# Patient Record
Sex: MALE | Race: WHITE | Employment: UNEMPLOYED | URBAN - METROPOLITAN AREA
[De-identification: names, ages, dates, MRNs, and addresses within clinical notes are randomized per-mention and may not be internally consistent; named-entity substitution may affect disease eponyms.]

---

## 2017-01-01 ENCOUNTER — HOSPITAL ENCOUNTER (INPATIENT)
Age: 62
LOS: 14 days | Discharge: NURSING, REHAB OR LONG-TERM CARE | DRG: 682 | End: 2017-10-20
Attending: EMERGENCY MEDICINE | Admitting: INTERNAL MEDICINE
Payer: COMMERCIAL

## 2017-01-01 ENCOUNTER — APPOINTMENT (OUTPATIENT)
Dept: CT IMAGING | Age: 62
DRG: 682 | End: 2017-01-01
Payer: COMMERCIAL

## 2017-01-01 ENCOUNTER — OFFICE VISIT (OUTPATIENT)
Dept: FAMILY MEDICINE CLINIC | Facility: CLINIC | Age: 62
End: 2017-01-01

## 2017-01-01 ENCOUNTER — OFFICE VISIT (OUTPATIENT)
Dept: FAMILY MEDICINE CLINIC | Age: 62
End: 2017-01-01
Payer: MEDICARE

## 2017-01-01 ENCOUNTER — TELEPHONE (OUTPATIENT)
Dept: FAMILY MEDICINE CLINIC | Facility: CLINIC | Age: 62
End: 2017-01-01

## 2017-01-01 ENCOUNTER — APPOINTMENT (OUTPATIENT)
Dept: ULTRASOUND IMAGING | Age: 62
DRG: 682 | End: 2017-01-01
Payer: COMMERCIAL

## 2017-01-01 ENCOUNTER — APPOINTMENT (OUTPATIENT)
Dept: GENERAL RADIOLOGY | Age: 62
DRG: 682 | End: 2017-01-01
Payer: COMMERCIAL

## 2017-01-01 ENCOUNTER — APPOINTMENT (OUTPATIENT)
Dept: MRI IMAGING | Age: 62
DRG: 682 | End: 2017-01-01
Payer: COMMERCIAL

## 2017-01-01 VITALS
SYSTOLIC BLOOD PRESSURE: 120 MMHG | HEART RATE: 113 BPM | BODY MASS INDEX: 30.21 KG/M2 | TEMPERATURE: 99 F | WEIGHT: 211 LBS | OXYGEN SATURATION: 97 % | DIASTOLIC BLOOD PRESSURE: 78 MMHG | HEIGHT: 70 IN

## 2017-01-01 VITALS
DIASTOLIC BLOOD PRESSURE: 76 MMHG | WEIGHT: 187 LBS | HEART RATE: 94 BPM | BODY MASS INDEX: 26.77 KG/M2 | TEMPERATURE: 97.5 F | SYSTOLIC BLOOD PRESSURE: 135 MMHG | HEIGHT: 70 IN | OXYGEN SATURATION: 98 % | RESPIRATION RATE: 20 BRPM

## 2017-01-01 VITALS
HEIGHT: 68 IN | TEMPERATURE: 97.9 F | DIASTOLIC BLOOD PRESSURE: 80 MMHG | HEART RATE: 64 BPM | BODY MASS INDEX: 32.74 KG/M2 | SYSTOLIC BLOOD PRESSURE: 130 MMHG | WEIGHT: 216 LBS

## 2017-01-01 VITALS
BODY MASS INDEX: 31.7 KG/M2 | HEIGHT: 69 IN | HEART RATE: 84 BPM | DIASTOLIC BLOOD PRESSURE: 78 MMHG | WEIGHT: 214 LBS | SYSTOLIC BLOOD PRESSURE: 120 MMHG | TEMPERATURE: 98.2 F

## 2017-01-01 DIAGNOSIS — R05.9 COUGH: ICD-10-CM

## 2017-01-01 DIAGNOSIS — J20.8 ACUTE BRONCHITIS DUE TO OTHER SPECIFIED ORGANISMS: Primary | ICD-10-CM

## 2017-01-01 DIAGNOSIS — B34.9 NONSPECIFIC SYNDROME SUGGESTIVE OF VIRAL ILLNESS: Primary | ICD-10-CM

## 2017-01-01 DIAGNOSIS — R50.9 FEVER CHILLS: ICD-10-CM

## 2017-01-01 DIAGNOSIS — N17.9 ACUTE KIDNEY INJURY (HCC): Primary | ICD-10-CM

## 2017-01-01 DIAGNOSIS — C90.02 MULTIPLE MYELOMA IN RELAPSE (HCC): ICD-10-CM

## 2017-01-01 DIAGNOSIS — J40 BRONCHITIS: Primary | ICD-10-CM

## 2017-01-01 LAB
-: ABNORMAL
ABO/RH: NORMAL
ABSOLUTE EOS #: 0 K/UL (ref 0–0.4)
ABSOLUTE EOS #: 0.01 K/UL (ref 0–0.4)
ABSOLUTE EOS #: 0.04 K/UL (ref 0–0.4)
ABSOLUTE EOS #: 0.05 K/UL (ref 0–0.4)
ABSOLUTE EOS #: 0.06 K/UL (ref 0–0.4)
ABSOLUTE EOS #: 0.07 K/UL (ref 0–0.4)
ABSOLUTE EOS #: 0.09 K/UL (ref 0–0.4)
ABSOLUTE EOS #: 0.09 K/UL (ref 0–0.4)
ABSOLUTE EOS #: 0.14 K/UL (ref 0–0.4)
ABSOLUTE EOS #: 0.16 K/UL (ref 0–0.4)
ABSOLUTE EOS #: 0.19 K/UL (ref 0–0.4)
ABSOLUTE EOS #: 0.2 K/UL (ref 0–0.4)
ABSOLUTE EOS #: 0.29 K/UL (ref 0–0.4)
ABSOLUTE EOS #: 0.32 K/UL (ref 0–0.4)
ABSOLUTE IMMATURE GRANULOCYTE: ABNORMAL K/UL (ref 0–0.3)
ABSOLUTE LYMPH #: 0.3 K/UL (ref 1–4.8)
ABSOLUTE LYMPH #: 0.32 K/UL (ref 1–4.8)
ABSOLUTE LYMPH #: 0.35 K/UL (ref 1–4.8)
ABSOLUTE LYMPH #: 0.36 K/UL (ref 1–4.8)
ABSOLUTE LYMPH #: 0.38 K/UL (ref 1–4.8)
ABSOLUTE LYMPH #: 0.4 K/UL (ref 1–4.8)
ABSOLUTE LYMPH #: 0.43 K/UL (ref 1–4.8)
ABSOLUTE LYMPH #: 0.46 K/UL (ref 1–4.8)
ABSOLUTE LYMPH #: 0.48 K/UL (ref 1–4.8)
ABSOLUTE LYMPH #: 0.5 K/UL (ref 1–4.8)
ABSOLUTE LYMPH #: 0.53 K/UL (ref 1–4.8)
ABSOLUTE LYMPH #: 0.61 K/UL (ref 1–4.8)
ABSOLUTE LYMPH #: 0.74 K/UL (ref 1–4.8)
ABSOLUTE LYMPH #: 0.81 K/UL (ref 1–4.8)
ABSOLUTE MONO #: 0.11 K/UL (ref 0.2–0.8)
ABSOLUTE MONO #: 0.22 K/UL (ref 0.2–0.8)
ABSOLUTE MONO #: 0.22 K/UL (ref 0.2–0.8)
ABSOLUTE MONO #: 0.28 K/UL (ref 0.2–0.8)
ABSOLUTE MONO #: 0.29 K/UL (ref 0.2–0.8)
ABSOLUTE MONO #: 0.35 K/UL (ref 0.2–0.8)
ABSOLUTE MONO #: 0.4 K/UL (ref 0.2–0.8)
ABSOLUTE MONO #: 0.5 K/UL (ref 0.2–0.8)
ABSOLUTE MONO #: 0.51 K/UL (ref 0.2–0.8)
ABSOLUTE MONO #: 0.51 K/UL (ref 0.2–0.8)
ABSOLUTE MONO #: 0.57 K/UL (ref 0.2–0.8)
ABSOLUTE MONO #: 0.72 K/UL (ref 0.2–0.8)
ALBUMIN (CALCULATED): 3.6 G/DL (ref 3.2–5.2)
ALBUMIN PERCENT: 63 % (ref 45–65)
ALBUMIN SERPL-MCNC: 3.8 G/DL (ref 3.5–5.2)
ALBUMIN SERPL-MCNC: 4.1 G/DL (ref 3.5–5.2)
ALBUMIN/GLOBULIN RATIO: ABNORMAL (ref 1–2.5)
ALBUMIN/GLOBULIN RATIO: NORMAL (ref 1–2.5)
ALP BLD-CCNC: 69 U/L (ref 40–129)
ALP BLD-CCNC: 69 U/L (ref 40–129)
ALPHA 1 PERCENT: 4 % (ref 3–6)
ALPHA 2 PERCENT: 16 % (ref 6–13)
ALPHA-1-GLOBULIN: 0.2 G/DL (ref 0.1–0.4)
ALPHA-2-GLOBULIN: 0.9 G/DL (ref 0.5–0.9)
ALT SERPL-CCNC: 12 U/L (ref 5–41)
ALT SERPL-CCNC: 21 U/L (ref 5–41)
AMMONIA: 46 UMOL/L (ref 16–60)
AMORPHOUS: ABNORMAL
ANION GAP SERPL CALCULATED.3IONS-SCNC: 12 MMOL/L (ref 9–17)
ANION GAP SERPL CALCULATED.3IONS-SCNC: 13 MMOL/L (ref 9–17)
ANION GAP SERPL CALCULATED.3IONS-SCNC: 14 MMOL/L (ref 9–17)
ANION GAP SERPL CALCULATED.3IONS-SCNC: 15 MMOL/L (ref 9–17)
ANION GAP SERPL CALCULATED.3IONS-SCNC: 16 MMOL/L (ref 9–17)
ANION GAP SERPL CALCULATED.3IONS-SCNC: 16 MMOL/L (ref 9–17)
ANION GAP SERPL CALCULATED.3IONS-SCNC: 17 MMOL/L (ref 9–17)
ANION GAP SERPL CALCULATED.3IONS-SCNC: 9 MMOL/L (ref 9–17)
ANTIBODY SCREEN: NEGATIVE
ARM BAND NUMBER: NORMAL
AST SERPL-CCNC: 17 U/L
AST SERPL-CCNC: 21 U/L
BACTERIA: ABNORMAL
BASOPHILS # BLD: 0 %
BASOPHILS # BLD: 1 %
BASOPHILS # BLD: 2 %
BASOPHILS ABSOLUTE: 0 K/UL (ref 0–0.2)
BASOPHILS ABSOLUTE: 0.01 K/UL (ref 0–0.2)
BASOPHILS ABSOLUTE: 0.02 K/UL (ref 0–0.2)
BASOPHILS ABSOLUTE: 0.04 K/UL (ref 0–0.2)
BETA GLOBULIN: 0.6 G/DL (ref 0.5–1.1)
BETA PERCENT: 11 % (ref 11–19)
BILIRUB SERPL-MCNC: 0.32 MG/DL (ref 0.3–1.2)
BILIRUB SERPL-MCNC: 0.45 MG/DL (ref 0.3–1.2)
BILIRUBIN DIRECT: 0.16 MG/DL
BILIRUBIN URINE: NEGATIVE
BILIRUBIN, INDIRECT: 0.29 MG/DL (ref 0–1)
BLD PROD TYP BPU: NORMAL
BLD PROD TYP BPU: NORMAL
BUN BLDV-MCNC: 25 MG/DL (ref 8–23)
BUN BLDV-MCNC: 26 MG/DL (ref 8–23)
BUN BLDV-MCNC: 27 MG/DL (ref 8–23)
BUN BLDV-MCNC: 27 MG/DL (ref 8–23)
BUN BLDV-MCNC: 30 MG/DL (ref 8–23)
BUN BLDV-MCNC: 31 MG/DL (ref 8–23)
BUN BLDV-MCNC: 31 MG/DL (ref 8–23)
BUN BLDV-MCNC: 32 MG/DL (ref 8–23)
BUN/CREAT BLD: 10 (ref 9–20)
BUN/CREAT BLD: 11 (ref 9–20)
BUN/CREAT BLD: 8 (ref 9–20)
BUN/CREAT BLD: 9 (ref 9–20)
CALCIUM IONIZED: 1.53 MMOL/L (ref 1.13–1.33)
CALCIUM SERPL-MCNC: 10.2 MG/DL (ref 8.6–10.4)
CALCIUM SERPL-MCNC: 10.3 MG/DL (ref 8.6–10.4)
CALCIUM SERPL-MCNC: 10.4 MG/DL (ref 8.6–10.4)
CALCIUM SERPL-MCNC: 10.7 MG/DL (ref 8.6–10.4)
CALCIUM SERPL-MCNC: 10.9 MG/DL (ref 8.6–10.4)
CALCIUM SERPL-MCNC: 11 MG/DL (ref 8.6–10.4)
CALCIUM SERPL-MCNC: 11.1 MG/DL (ref 8.6–10.4)
CALCIUM SERPL-MCNC: 11.2 MG/DL (ref 8.6–10.4)
CALCIUM SERPL-MCNC: 11.2 MG/DL (ref 8.6–10.4)
CALCIUM SERPL-MCNC: 11.3 MG/DL (ref 8.6–10.4)
CALCIUM SERPL-MCNC: 11.4 MG/DL (ref 8.6–10.4)
CALCIUM SERPL-MCNC: 9.4 MG/DL (ref 8.6–10.4)
CALCIUM SERPL-MCNC: 9.5 MG/DL (ref 8.6–10.4)
CALCIUM SERPL-MCNC: 9.6 MG/DL (ref 8.6–10.4)
CASTS UA: ABNORMAL /LPF
CHLORIDE BLD-SCNC: 102 MMOL/L (ref 98–107)
CHLORIDE BLD-SCNC: 103 MMOL/L (ref 98–107)
CHLORIDE BLD-SCNC: 104 MMOL/L (ref 98–107)
CHLORIDE BLD-SCNC: 105 MMOL/L (ref 98–107)
CHLORIDE BLD-SCNC: 106 MMOL/L (ref 98–107)
CHLORIDE BLD-SCNC: 106 MMOL/L (ref 98–107)
CHLORIDE BLD-SCNC: 107 MMOL/L (ref 98–107)
CHLORIDE BLD-SCNC: 108 MMOL/L (ref 98–107)
CHLORIDE BLD-SCNC: 108 MMOL/L (ref 98–107)
CHLORIDE BLD-SCNC: 109 MMOL/L (ref 98–107)
CHLORIDE BLD-SCNC: 109 MMOL/L (ref 98–107)
CHLORIDE BLD-SCNC: 110 MMOL/L (ref 98–107)
CHLORIDE BLD-SCNC: 112 MMOL/L (ref 98–107)
CHLORIDE BLD-SCNC: 113 MMOL/L (ref 98–107)
CHLORIDE BLD-SCNC: 96 MMOL/L (ref 98–107)
CO2: 20 MMOL/L (ref 20–31)
CO2: 21 MMOL/L (ref 20–31)
CO2: 21 MMOL/L (ref 20–31)
CO2: 22 MMOL/L (ref 20–31)
CO2: 23 MMOL/L (ref 20–31)
CO2: 23 MMOL/L (ref 20–31)
CO2: 25 MMOL/L (ref 20–31)
COLOR: YELLOW
COMMENT UA: ABNORMAL
CREAT SERPL-MCNC: 2.45 MG/DL (ref 0.7–1.2)
CREAT SERPL-MCNC: 2.48 MG/DL (ref 0.7–1.2)
CREAT SERPL-MCNC: 2.52 MG/DL (ref 0.7–1.2)
CREAT SERPL-MCNC: 2.54 MG/DL (ref 0.7–1.2)
CREAT SERPL-MCNC: 2.63 MG/DL (ref 0.7–1.2)
CREAT SERPL-MCNC: 2.67 MG/DL (ref 0.7–1.2)
CREAT SERPL-MCNC: 2.78 MG/DL (ref 0.7–1.2)
CREAT SERPL-MCNC: 2.81 MG/DL (ref 0.7–1.2)
CREAT SERPL-MCNC: 2.9 MG/DL (ref 0.7–1.2)
CREAT SERPL-MCNC: 2.96 MG/DL (ref 0.7–1.2)
CREAT SERPL-MCNC: 3.41 MG/DL (ref 0.7–1.2)
CREAT SERPL-MCNC: 3.42 MG/DL (ref 0.7–1.2)
CREAT SERPL-MCNC: 3.47 MG/DL (ref 0.7–1.2)
CREAT SERPL-MCNC: 3.63 MG/DL (ref 0.7–1.2)
CREAT SERPL-MCNC: 3.83 MG/DL (ref 0.7–1.2)
CREAT SERPL-MCNC: 3.87 MG/DL (ref 0.7–1.2)
CREAT SERPL-MCNC: 3.9 MG/DL (ref 0.7–1.2)
CREATININE URINE: 51 MG/DL (ref 39–259)
CREATININE URINE: 52.9 MG/DL (ref 39–259)
CROSSMATCH RESULT: NORMAL
CROSSMATCH RESULT: NORMAL
CRYSTALS, UA: ABNORMAL /HPF
DIFFERENTIAL TYPE: ABNORMAL
DISPENSE STATUS BLOOD BANK: NORMAL
DISPENSE STATUS BLOOD BANK: NORMAL
EKG ATRIAL RATE: 95 BPM
EKG P AXIS: 39 DEGREES
EKG P-R INTERVAL: 142 MS
EKG Q-T INTERVAL: 352 MS
EKG QRS DURATION: 128 MS
EKG QTC CALCULATION (BAZETT): 442 MS
EKG R AXIS: -27 DEGREES
EKG T AXIS: 38 DEGREES
EKG VENTRICULAR RATE: 95 BPM
EOSINOPHILS RELATIVE PERCENT: 1 %
EOSINOPHILS RELATIVE PERCENT: 10 %
EOSINOPHILS RELATIVE PERCENT: 10 %
EOSINOPHILS RELATIVE PERCENT: 2 %
EOSINOPHILS RELATIVE PERCENT: 3 %
EOSINOPHILS RELATIVE PERCENT: 4 %
EOSINOPHILS RELATIVE PERCENT: 7 %
EOSINOPHILS RELATIVE PERCENT: 7 %
EOSINOPHILS RELATIVE PERCENT: 8 %
EOSINOPHILS RELATIVE PERCENT: 9 %
EPITHELIAL CELLS UA: ABNORMAL /HPF
EXPIRATION DATE: NORMAL
FOLATE: 13.5 NG/ML
FREE KAPPA/LAMBDA RATIO: 488.85 (ref 0.26–1.65)
GAMMA GLOBULIN %: 7 % (ref 9–20)
GAMMA GLOBULIN: 0.4 G/DL (ref 0.5–1.5)
GFR AFRICAN AMERICAN: 19 ML/MIN
GFR AFRICAN AMERICAN: 21 ML/MIN
GFR AFRICAN AMERICAN: 22 ML/MIN
GFR AFRICAN AMERICAN: 26 ML/MIN
GFR AFRICAN AMERICAN: 27 ML/MIN
GFR AFRICAN AMERICAN: 28 ML/MIN
GFR AFRICAN AMERICAN: 28 ML/MIN
GFR AFRICAN AMERICAN: 29 ML/MIN
GFR AFRICAN AMERICAN: 30 ML/MIN
GFR AFRICAN AMERICAN: 31 ML/MIN
GFR AFRICAN AMERICAN: 31 ML/MIN
GFR AFRICAN AMERICAN: 32 ML/MIN
GFR AFRICAN AMERICAN: 32 ML/MIN
GFR NON-AFRICAN AMERICAN: 16 ML/MIN
GFR NON-AFRICAN AMERICAN: 17 ML/MIN
GFR NON-AFRICAN AMERICAN: 18 ML/MIN
GFR NON-AFRICAN AMERICAN: 22 ML/MIN
GFR NON-AFRICAN AMERICAN: 22 ML/MIN
GFR NON-AFRICAN AMERICAN: 23 ML/MIN
GFR NON-AFRICAN AMERICAN: 23 ML/MIN
GFR NON-AFRICAN AMERICAN: 24 ML/MIN
GFR NON-AFRICAN AMERICAN: 25 ML/MIN
GFR NON-AFRICAN AMERICAN: 26 ML/MIN
GFR NON-AFRICAN AMERICAN: 27 ML/MIN
GFR SERPL CREATININE-BSD FRML MDRD: ABNORMAL ML/MIN/{1.73_M2}
GLOBULIN: NORMAL G/DL (ref 1.5–3.8)
GLUCOSE BLD-MCNC: 102 MG/DL (ref 70–99)
GLUCOSE BLD-MCNC: 103 MG/DL (ref 70–99)
GLUCOSE BLD-MCNC: 105 MG/DL (ref 70–99)
GLUCOSE BLD-MCNC: 105 MG/DL (ref 70–99)
GLUCOSE BLD-MCNC: 106 MG/DL (ref 70–99)
GLUCOSE BLD-MCNC: 111 MG/DL (ref 70–99)
GLUCOSE BLD-MCNC: 113 MG/DL (ref 70–99)
GLUCOSE BLD-MCNC: 115 MG/DL (ref 70–99)
GLUCOSE BLD-MCNC: 123 MG/DL (ref 70–99)
GLUCOSE BLD-MCNC: 125 MG/DL (ref 70–99)
GLUCOSE BLD-MCNC: 126 MG/DL (ref 70–99)
GLUCOSE BLD-MCNC: 136 MG/DL (ref 70–99)
GLUCOSE BLD-MCNC: 140 MG/DL (ref 70–99)
GLUCOSE BLD-MCNC: 93 MG/DL (ref 70–99)
GLUCOSE BLD-MCNC: 93 MG/DL (ref 70–99)
GLUCOSE BLD-MCNC: 97 MG/DL (ref 70–99)
GLUCOSE BLD-MCNC: 98 MG/DL (ref 70–99)
GLUCOSE URINE: NEGATIVE
HCT VFR BLD CALC: 19.1 % (ref 41–53)
HCT VFR BLD CALC: 22.5 % (ref 41–53)
HCT VFR BLD CALC: 23.2 % (ref 41–53)
HCT VFR BLD CALC: 23.5 % (ref 41–53)
HCT VFR BLD CALC: 24.6 % (ref 41–53)
HCT VFR BLD CALC: 24.7 % (ref 41–53)
HCT VFR BLD CALC: 24.9 % (ref 41–53)
HCT VFR BLD CALC: 25.6 % (ref 41–53)
HCT VFR BLD CALC: 26.2 % (ref 41–53)
HCT VFR BLD CALC: 26.4 % (ref 41–53)
HCT VFR BLD CALC: 26.5 % (ref 41–53)
HCT VFR BLD CALC: 26.5 % (ref 41–53)
HCT VFR BLD CALC: 26.8 % (ref 41–53)
HCT VFR BLD CALC: 28.9 % (ref 41–53)
HCT VFR BLD CALC: 29 % (ref 41–53)
HCT VFR BLD CALC: 29.3 % (ref 41–53)
HEMOGLOBIN: 10.4 G/DL (ref 13.5–17.5)
HEMOGLOBIN: 6.6 G/DL (ref 13.5–17.5)
HEMOGLOBIN: 7.8 G/DL (ref 13.5–17.5)
HEMOGLOBIN: 8 G/DL (ref 13.5–17.5)
HEMOGLOBIN: 8.1 G/DL (ref 13.5–17.5)
HEMOGLOBIN: 8.2 G/DL (ref 13.5–17.5)
HEMOGLOBIN: 8.4 G/DL (ref 13.5–17.5)
HEMOGLOBIN: 8.5 G/DL (ref 13.5–17.5)
HEMOGLOBIN: 8.6 G/DL (ref 13.5–17.5)
HEMOGLOBIN: 8.8 G/DL (ref 13.5–17.5)
HEMOGLOBIN: 8.9 G/DL (ref 13.5–17.5)
HEMOGLOBIN: 8.9 G/DL (ref 13.5–17.5)
HEMOGLOBIN: 9.1 G/DL (ref 13.5–17.5)
HEMOGLOBIN: 9.1 G/DL (ref 13.5–17.5)
HEMOGLOBIN: 9.6 G/DL (ref 13.5–17.5)
HEMOGLOBIN: 9.8 G/DL (ref 13.5–17.5)
IMMATURE GRANULOCYTES: ABNORMAL %
INFLUENZA A ANTIBODY: NEGATIVE
INFLUENZA B ANTIBODY: NEGATIVE
KAPPA FREE LIGHT CHAINS QNT: 435.08 MG/DL (ref 0.37–1.94)
KETONES, URINE: NEGATIVE
LAMBDA FREE LIGHT CHAINS QNT: 0.89 MG/DL (ref 0.57–2.63)
LEUKOCYTE ESTERASE, URINE: NEGATIVE
LYMPHOCYTES # BLD: 15 %
LYMPHOCYTES # BLD: 16 %
LYMPHOCYTES # BLD: 17 %
LYMPHOCYTES # BLD: 19 %
LYMPHOCYTES # BLD: 19 %
LYMPHOCYTES # BLD: 20 %
LYMPHOCYTES # BLD: 20 %
LYMPHOCYTES # BLD: 21 %
LYMPHOCYTES # BLD: 22 %
LYMPHOCYTES # BLD: 22 %
LYMPHOCYTES # BLD: 23 %
LYMPHOCYTES # BLD: 27 %
LYMPHOCYTES # BLD: 35 %
LYMPHOCYTES # BLD: 36 %
MAGNESIUM: 1.5 MG/DL (ref 1.6–2.6)
MAGNESIUM: 1.6 MG/DL (ref 1.6–2.6)
MAGNESIUM: 1.7 MG/DL (ref 1.6–2.6)
MAGNESIUM: 1.7 MG/DL (ref 1.6–2.6)
MAGNESIUM: 1.8 MG/DL (ref 1.6–2.6)
MAGNESIUM: 1.8 MG/DL (ref 1.6–2.6)
MAGNESIUM: 1.9 MG/DL (ref 1.6–2.6)
MAGNESIUM: 2 MG/DL (ref 1.6–2.6)
MAGNESIUM: 2.1 MG/DL (ref 1.6–2.6)
MAGNESIUM: 2.2 MG/DL (ref 1.6–2.6)
MAGNESIUM: 2.2 MG/DL (ref 1.6–2.6)
MCH RBC QN AUTO: 30.8 PG (ref 26–34)
MCH RBC QN AUTO: 31 PG (ref 26–34)
MCH RBC QN AUTO: 31.1 PG (ref 26–34)
MCH RBC QN AUTO: 31.2 PG (ref 26–34)
MCH RBC QN AUTO: 31.3 PG (ref 26–34)
MCH RBC QN AUTO: 31.5 PG (ref 26–34)
MCH RBC QN AUTO: 31.6 PG (ref 26–34)
MCH RBC QN AUTO: 31.7 PG (ref 26–34)
MCH RBC QN AUTO: 31.7 PG (ref 26–34)
MCH RBC QN AUTO: 31.8 PG (ref 26–34)
MCH RBC QN AUTO: 32 PG (ref 26–34)
MCH RBC QN AUTO: 32.3 PG (ref 26–34)
MCH RBC QN AUTO: 32.9 PG (ref 26–34)
MCHC RBC AUTO-ENTMCNC: 33.2 G/DL (ref 31–37)
MCHC RBC AUTO-ENTMCNC: 33.5 G/DL (ref 31–37)
MCHC RBC AUTO-ENTMCNC: 33.6 G/DL (ref 31–37)
MCHC RBC AUTO-ENTMCNC: 33.6 G/DL (ref 31–37)
MCHC RBC AUTO-ENTMCNC: 33.8 G/DL (ref 31–37)
MCHC RBC AUTO-ENTMCNC: 33.9 G/DL (ref 31–37)
MCHC RBC AUTO-ENTMCNC: 34 G/DL (ref 31–37)
MCHC RBC AUTO-ENTMCNC: 34.1 G/DL (ref 31–37)
MCHC RBC AUTO-ENTMCNC: 34.4 G/DL (ref 31–37)
MCHC RBC AUTO-ENTMCNC: 34.5 G/DL (ref 31–37)
MCHC RBC AUTO-ENTMCNC: 34.6 G/DL (ref 31–37)
MCHC RBC AUTO-ENTMCNC: 34.6 G/DL (ref 31–37)
MCHC RBC AUTO-ENTMCNC: 35.5 G/DL (ref 31–37)
MCV RBC AUTO: 91.3 FL (ref 80–100)
MCV RBC AUTO: 91.8 FL (ref 80–100)
MCV RBC AUTO: 92 FL (ref 80–100)
MCV RBC AUTO: 92.1 FL (ref 80–100)
MCV RBC AUTO: 92.2 FL (ref 80–100)
MCV RBC AUTO: 92.5 FL (ref 80–100)
MCV RBC AUTO: 92.7 FL (ref 80–100)
MCV RBC AUTO: 92.8 FL (ref 80–100)
MCV RBC AUTO: 92.9 FL (ref 80–100)
MCV RBC AUTO: 93.1 FL (ref 80–100)
MCV RBC AUTO: 93.1 FL (ref 80–100)
MCV RBC AUTO: 93.4 FL (ref 80–100)
MCV RBC AUTO: 93.6 FL (ref 80–100)
MCV RBC AUTO: 94 FL (ref 80–100)
MCV RBC AUTO: 94.5 FL (ref 80–100)
METAMYELOCYTES ABSOLUTE COUNT: 0.01 K/UL
METAMYELOCYTES ABSOLUTE COUNT: 0.03 K/UL
METAMYELOCYTES ABSOLUTE COUNT: 0.03 K/UL
METAMYELOCYTES ABSOLUTE COUNT: 0.07 K/UL
METAMYELOCYTES ABSOLUTE COUNT: 0.1 K/UL
METAMYELOCYTES ABSOLUTE COUNT: 0.13 K/UL
METAMYELOCYTES: 1 %
METAMYELOCYTES: 2 %
METAMYELOCYTES: 2 %
METAMYELOCYTES: 3 %
METAMYELOCYTES: 4 %
METAMYELOCYTES: 4 %
MICROALBUMIN/CREAT 24H UR: 49 MG/L
MICROALBUMIN/CREAT UR-RTO: 93 MCG/MG CREAT
MONOCYTES # BLD: 12 %
MONOCYTES # BLD: 13 %
MONOCYTES # BLD: 14 %
MONOCYTES # BLD: 16 %
MONOCYTES # BLD: 19 %
MONOCYTES # BLD: 19 %
MONOCYTES # BLD: 20 %
MONOCYTES # BLD: 22 %
MONOCYTES # BLD: 22 %
MONOCYTES # BLD: 24 %
MONOCYTES # BLD: 26 %
MONOCYTES # BLD: 30 %
MONOCYTES # BLD: 7 %
MONOCYTES # BLD: 9 %
MORPHOLOGY: ABNORMAL
MUCUS: ABNORMAL
MYELOCYTES ABSOLUTE COUNT: 0.01 K/UL
MYELOCYTES ABSOLUTE COUNT: 0.03 K/UL
MYELOCYTES ABSOLUTE COUNT: 0.07 K/UL
MYELOCYTES: 1 %
MYELOCYTES: 2 %
MYELOCYTES: 3 %
NITRITE, URINE: NEGATIVE
NUCLEATED RED BLOOD CELLS: 1 PER 100 WBC
NUCLEATED RED BLOOD CELLS: 2 PER 100 WBC
NUCLEATED RED BLOOD CELLS: 4 PER 100 WBC
OTHER OBSERVATIONS UA: ABNORMAL
P E INTERPRETATION, U: NORMAL
PATHOLOGIST: ABNORMAL
PATHOLOGIST: NORMAL
PATHOLOGIST: NORMAL
PDW BLD-RTO: 13.7 % (ref 11.5–14.5)
PDW BLD-RTO: 13.7 % (ref 11.5–14.5)
PDW BLD-RTO: 13.8 % (ref 11.5–14.5)
PDW BLD-RTO: 14 % (ref 11.5–14.5)
PDW BLD-RTO: 14.2 % (ref 11.5–14.5)
PDW BLD-RTO: 14.2 % (ref 11.5–14.5)
PDW BLD-RTO: 14.4 % (ref 11.5–14.5)
PDW BLD-RTO: 14.5 % (ref 11.5–14.5)
PDW BLD-RTO: 14.8 % (ref 11.5–14.5)
PDW BLD-RTO: 14.8 % (ref 11.5–14.5)
PDW BLD-RTO: 14.9 % (ref 11.5–14.5)
PDW BLD-RTO: 14.9 % (ref 11.5–14.5)
PDW BLD-RTO: 15.3 % (ref 11.5–14.5)
PH UA: 5.5 (ref 5–8)
PHOSPHORUS: 3.5 MG/DL (ref 2.5–4.5)
PHOSPHORUS: 4.5 MG/DL (ref 2.5–4.5)
PHOSPHORUS: 4.8 MG/DL (ref 2.5–4.5)
PHOSPHORUS: 5.5 MG/DL (ref 2.5–4.5)
PLATELET # BLD: 38 K/UL (ref 130–400)
PLATELET # BLD: 41 K/UL (ref 130–400)
PLATELET # BLD: 43 K/UL (ref 130–400)
PLATELET # BLD: 45 K/UL (ref 130–400)
PLATELET # BLD: 46 K/UL (ref 130–400)
PLATELET # BLD: 47 K/UL (ref 130–400)
PLATELET # BLD: 48 K/UL (ref 130–400)
PLATELET # BLD: 49 K/UL (ref 130–400)
PLATELET # BLD: 53 K/UL (ref 130–400)
PLATELET # BLD: 56 K/UL (ref 130–400)
PLATELET # BLD: 58 K/UL (ref 130–400)
PLATELET ESTIMATE: ABNORMAL
PMV BLD AUTO: 6.9 FL (ref 6–12)
PMV BLD AUTO: 7 FL (ref 6–12)
PMV BLD AUTO: 7 FL (ref 6–12)
PMV BLD AUTO: 7.4 FL (ref 6–12)
PMV BLD AUTO: 7.6 FL (ref 6–12)
PMV BLD AUTO: ABNORMAL FL (ref 6–12)
POTASSIUM SERPL-SCNC: 3.5 MMOL/L (ref 3.7–5.3)
POTASSIUM SERPL-SCNC: 3.6 MMOL/L (ref 3.7–5.3)
POTASSIUM SERPL-SCNC: 3.6 MMOL/L (ref 3.7–5.3)
POTASSIUM SERPL-SCNC: 3.7 MMOL/L (ref 3.7–5.3)
POTASSIUM SERPL-SCNC: 3.7 MMOL/L (ref 3.7–5.3)
POTASSIUM SERPL-SCNC: 3.9 MMOL/L (ref 3.7–5.3)
POTASSIUM SERPL-SCNC: 4.3 MMOL/L (ref 3.7–5.3)
POTASSIUM SERPL-SCNC: 4.3 MMOL/L (ref 3.7–5.3)
POTASSIUM SERPL-SCNC: 4.5 MMOL/L (ref 3.7–5.3)
POTASSIUM SERPL-SCNC: 4.6 MMOL/L (ref 3.7–5.3)
POTASSIUM SERPL-SCNC: 4.8 MMOL/L (ref 3.7–5.3)
POTASSIUM SERPL-SCNC: 4.9 MMOL/L (ref 3.7–5.3)
POTASSIUM SERPL-SCNC: 4.9 MMOL/L (ref 3.7–5.3)
POTASSIUM SERPL-SCNC: 5.1 MMOL/L (ref 3.7–5.3)
POTASSIUM SERPL-SCNC: 5.3 MMOL/L (ref 3.7–5.3)
PROTEIN ELECTROPHORESIS, SERUM: ABNORMAL
PROTEIN UA: ABNORMAL
RBC # BLD: 2.09 M/UL (ref 4.5–5.9)
RBC # BLD: 2.43 M/UL (ref 4.5–5.9)
RBC # BLD: 2.52 M/UL (ref 4.5–5.9)
RBC # BLD: 2.55 M/UL (ref 4.5–5.9)
RBC # BLD: 2.64 M/UL (ref 4.5–5.9)
RBC # BLD: 2.64 M/UL (ref 4.5–5.9)
RBC # BLD: 2.66 M/UL (ref 4.5–5.9)
RBC # BLD: 2.77 M/UL (ref 4.5–5.9)
RBC # BLD: 2.81 M/UL (ref 4.5–5.9)
RBC # BLD: 2.82 M/UL (ref 4.5–5.9)
RBC # BLD: 2.85 M/UL (ref 4.5–5.9)
RBC # BLD: 2.92 M/UL (ref 4.5–5.9)
RBC # BLD: 3.1 M/UL (ref 4.5–5.9)
RBC # BLD: 3.12 M/UL (ref 4.5–5.9)
RBC # BLD: 3.16 M/UL (ref 4.5–5.9)
RBC # BLD: ABNORMAL 10*6/UL
RBC UA: ABNORMAL /HPF (ref 0–2)
RENAL EPITHELIAL, UA: ABNORMAL /HPF
SEG NEUTROPHILS: 39 %
SEG NEUTROPHILS: 40 %
SEG NEUTROPHILS: 46 %
SEG NEUTROPHILS: 46 %
SEG NEUTROPHILS: 51 %
SEG NEUTROPHILS: 51 %
SEG NEUTROPHILS: 52 %
SEG NEUTROPHILS: 53 %
SEG NEUTROPHILS: 55 %
SEG NEUTROPHILS: 56 %
SEG NEUTROPHILS: 56 %
SEG NEUTROPHILS: 57 %
SEG NEUTROPHILS: 62 %
SEG NEUTROPHILS: 68 %
SEGMENTED NEUTROPHILS ABSOLUTE COUNT: 0.62 K/UL (ref 1.8–7.7)
SEGMENTED NEUTROPHILS ABSOLUTE COUNT: 0.74 K/UL (ref 1.8–7.7)
SEGMENTED NEUTROPHILS ABSOLUTE COUNT: 0.89 K/UL (ref 1.8–7.7)
SEGMENTED NEUTROPHILS ABSOLUTE COUNT: 0.89 K/UL (ref 1.8–7.7)
SEGMENTED NEUTROPHILS ABSOLUTE COUNT: 0.96 K/UL (ref 1.8–7.7)
SEGMENTED NEUTROPHILS ABSOLUTE COUNT: 1.01 K/UL (ref 1.8–7.7)
SEGMENTED NEUTROPHILS ABSOLUTE COUNT: 1.06 K/UL (ref 1.8–7.7)
SEGMENTED NEUTROPHILS ABSOLUTE COUNT: 1.1 K/UL (ref 1.8–7.7)
SEGMENTED NEUTROPHILS ABSOLUTE COUNT: 1.1 K/UL (ref 1.8–7.7)
SEGMENTED NEUTROPHILS ABSOLUTE COUNT: 1.16 K/UL (ref 1.8–7.7)
SEGMENTED NEUTROPHILS ABSOLUTE COUNT: 1.36 K/UL (ref 1.8–7.7)
SEGMENTED NEUTROPHILS ABSOLUTE COUNT: 1.48 K/UL (ref 1.8–7.7)
SEGMENTED NEUTROPHILS ABSOLUTE COUNT: 1.79 K/UL (ref 1.8–7.7)
SEGMENTED NEUTROPHILS ABSOLUTE COUNT: 1.79 K/UL (ref 1.8–7.7)
SERUM IFX INTERP: NORMAL
SODIUM BLD-SCNC: 137 MMOL/L (ref 135–144)
SODIUM BLD-SCNC: 137 MMOL/L (ref 135–144)
SODIUM BLD-SCNC: 138 MMOL/L (ref 135–144)
SODIUM BLD-SCNC: 139 MMOL/L (ref 135–144)
SODIUM BLD-SCNC: 141 MMOL/L (ref 135–144)
SODIUM BLD-SCNC: 144 MMOL/L (ref 135–144)
SODIUM BLD-SCNC: 145 MMOL/L (ref 135–144)
SODIUM BLD-SCNC: 148 MMOL/L (ref 135–144)
SODIUM BLD-SCNC: 148 MMOL/L (ref 135–144)
SODIUM,UR: 51 MMOL/L
SPECIFIC GRAVITY UA: 1.02 (ref 1–1.03)
SPECIMEN TYPE: NORMAL
T3 FREE: 3.94
TOTAL CK: 127 U/L (ref 39–308)
TOTAL PROT. SUM,%: 101 % (ref 98–102)
TOTAL PROT. SUM: 5.7 G/DL (ref 6.3–8.2)
TOTAL PROTEIN: 5.7 G/DL (ref 6.4–8.3)
TOTAL PROTEIN: 6.4 G/DL (ref 6.4–8.3)
TOTAL PROTEIN: 6.5 G/DL (ref 6.4–8.3)
TRANSFUSION STATUS: NORMAL
TRANSFUSION STATUS: NORMAL
TRICHOMONAS: ABNORMAL
TURBIDITY: CLEAR
UNIT DIVISION: 0
UNIT DIVISION: 0
UNIT NUMBER: NORMAL
UNIT NUMBER: NORMAL
URIC ACID: 8.1 MG/DL (ref 3.4–7)
URINE HGB: ABNORMAL
URINE IFX INTERP: NORMAL
URINE IFX SPECIMEN: NORMAL
URINE TOTAL PROTEIN: 95 MG/DL
URINE TOTAL PROTEIN: 95 MG/DL
UROBILINOGEN, URINE: NORMAL
VITAMIN B-12: 965 PG/ML (ref 211–946)
VOLUME: NORMAL ML
WBC # BLD: 1.2 K/UL (ref 3.5–11)
WBC # BLD: 1.2 K/UL (ref 3.5–11)
WBC # BLD: 1.6 K/UL (ref 3.5–11)
WBC # BLD: 1.7 K/UL (ref 3.5–11)
WBC # BLD: 1.9 K/UL (ref 3.5–11)
WBC # BLD: 2 K/UL (ref 3.5–11)
WBC # BLD: 2 K/UL (ref 3.5–11)
WBC # BLD: 2.1 K/UL (ref 3.5–11)
WBC # BLD: 2.2 K/UL (ref 3.5–11)
WBC # BLD: 2.2 K/UL (ref 3.5–11)
WBC # BLD: 2.3 K/UL (ref 3.5–11)
WBC # BLD: 2.4 K/UL (ref 3.5–11)
WBC # BLD: 2.4 K/UL (ref 3.5–11)
WBC # BLD: 3.2 K/UL (ref 3.5–11)
WBC # BLD: 3.2 K/UL (ref 3.5–11)
WBC # BLD: ABNORMAL 10*3/UL
WBC UA: ABNORMAL /HPF (ref 0–5)
YEAST: ABNORMAL

## 2017-01-01 PROCEDURE — 70450 CT HEAD/BRAIN W/O DYE: CPT

## 2017-01-01 PROCEDURE — 85025 COMPLETE CBC W/AUTO DIFF WBC: CPT

## 2017-01-01 PROCEDURE — 84100 ASSAY OF PHOSPHORUS: CPT

## 2017-01-01 PROCEDURE — 6360000002 HC RX W HCPCS: Performed by: INTERNAL MEDICINE

## 2017-01-01 PROCEDURE — 97535 SELF CARE MNGMENT TRAINING: CPT

## 2017-01-01 PROCEDURE — 51702 INSERT TEMP BLADDER CATH: CPT

## 2017-01-01 PROCEDURE — 80048 BASIC METABOLIC PNL TOTAL CA: CPT

## 2017-01-01 PROCEDURE — 36415 COLL VENOUS BLD VENIPUNCTURE: CPT

## 2017-01-01 PROCEDURE — 2580000003 HC RX 258: Performed by: INTERNAL MEDICINE

## 2017-01-01 PROCEDURE — 99232 SBSQ HOSP IP/OBS MODERATE 35: CPT | Performed by: INTERNAL MEDICINE

## 2017-01-01 PROCEDURE — 6370000000 HC RX 637 (ALT 250 FOR IP): Performed by: INTERNAL MEDICINE

## 2017-01-01 PROCEDURE — 97535 SELF CARE MNGMENT TRAINING: CPT | Performed by: NURSE PRACTITIONER

## 2017-01-01 PROCEDURE — 97110 THERAPEUTIC EXERCISES: CPT

## 2017-01-01 PROCEDURE — 99223 1ST HOSP IP/OBS HIGH 75: CPT | Performed by: INTERNAL MEDICINE

## 2017-01-01 PROCEDURE — 97116 GAIT TRAINING THERAPY: CPT

## 2017-01-01 PROCEDURE — 83735 ASSAY OF MAGNESIUM: CPT

## 2017-01-01 PROCEDURE — 83883 ASSAY NEPHELOMETRY NOT SPEC: CPT

## 2017-01-01 PROCEDURE — 97161 PT EVAL LOW COMPLEX 20 MIN: CPT

## 2017-01-01 PROCEDURE — 2580000003 HC RX 258: Performed by: EMERGENCY MEDICINE

## 2017-01-01 PROCEDURE — 2060000000 HC ICU INTERMEDIATE R&B

## 2017-01-01 PROCEDURE — 80076 HEPATIC FUNCTION PANEL: CPT

## 2017-01-01 PROCEDURE — 84165 PROTEIN E-PHORESIS SERUM: CPT

## 2017-01-01 PROCEDURE — G8988 SELF CARE GOAL STATUS: HCPCS

## 2017-01-01 PROCEDURE — P9016 RBC LEUKOCYTES REDUCED: HCPCS

## 2017-01-01 PROCEDURE — 97530 THERAPEUTIC ACTIVITIES: CPT

## 2017-01-01 PROCEDURE — 86335 IMMUNFIX E-PHORSIS/URINE/CSF: CPT

## 2017-01-01 PROCEDURE — 86901 BLOOD TYPING SEROLOGIC RH(D): CPT

## 2017-01-01 PROCEDURE — 82607 VITAMIN B-12: CPT

## 2017-01-01 PROCEDURE — G8987 SELF CARE CURRENT STATUS: HCPCS

## 2017-01-01 PROCEDURE — 82140 ASSAY OF AMMONIA: CPT

## 2017-01-01 PROCEDURE — 97166 OT EVAL MOD COMPLEX 45 MIN: CPT

## 2017-01-01 PROCEDURE — 84166 PROTEIN E-PHORESIS/URINE/CSF: CPT

## 2017-01-01 PROCEDURE — 36430 TRANSFUSION BLD/BLD COMPNT: CPT

## 2017-01-01 PROCEDURE — 6370000000 HC RX 637 (ALT 250 FOR IP): Performed by: NURSE PRACTITIONER

## 2017-01-01 PROCEDURE — 85014 HEMATOCRIT: CPT

## 2017-01-01 PROCEDURE — 99285 EMERGENCY DEPT VISIT HI MDM: CPT

## 2017-01-01 PROCEDURE — 85018 HEMOGLOBIN: CPT

## 2017-01-01 PROCEDURE — 84300 ASSAY OF URINE SODIUM: CPT

## 2017-01-01 PROCEDURE — 86900 BLOOD TYPING SEROLOGIC ABO: CPT

## 2017-01-01 PROCEDURE — 87804 INFLUENZA ASSAY W/OPTIC: CPT | Performed by: NURSE PRACTITIONER

## 2017-01-01 PROCEDURE — 86920 COMPATIBILITY TEST SPIN: CPT

## 2017-01-01 PROCEDURE — 82550 ASSAY OF CK (CPK): CPT

## 2017-01-01 PROCEDURE — G8978 MOBILITY CURRENT STATUS: HCPCS

## 2017-01-01 PROCEDURE — 76770 US EXAM ABDO BACK WALL COMP: CPT

## 2017-01-01 PROCEDURE — 82570 ASSAY OF URINE CREATININE: CPT

## 2017-01-01 PROCEDURE — 99233 SBSQ HOSP IP/OBS HIGH 50: CPT | Performed by: INTERNAL MEDICINE

## 2017-01-01 PROCEDURE — 82043 UR ALBUMIN QUANTITATIVE: CPT

## 2017-01-01 PROCEDURE — 86334 IMMUNOFIX E-PHORESIS SERUM: CPT

## 2017-01-01 PROCEDURE — 70551 MRI BRAIN STEM W/O DYE: CPT

## 2017-01-01 PROCEDURE — 86850 RBC ANTIBODY SCREEN: CPT

## 2017-01-01 PROCEDURE — 99214 OFFICE O/P EST MOD 30 MIN: CPT | Performed by: FAMILY MEDICINE

## 2017-01-01 PROCEDURE — 95816 EEG AWAKE AND DROWSY: CPT

## 2017-01-01 PROCEDURE — 51798 US URINE CAPACITY MEASURE: CPT

## 2017-01-01 PROCEDURE — 93005 ELECTROCARDIOGRAM TRACING: CPT

## 2017-01-01 PROCEDURE — 82330 ASSAY OF CALCIUM: CPT

## 2017-01-01 PROCEDURE — 71020 XR CHEST STANDARD TWO VW: CPT

## 2017-01-01 PROCEDURE — 82746 ASSAY OF FOLIC ACID SERUM: CPT

## 2017-01-01 PROCEDURE — 84156 ASSAY OF PROTEIN URINE: CPT

## 2017-01-01 PROCEDURE — 81050 URINALYSIS VOLUME MEASURE: CPT

## 2017-01-01 PROCEDURE — 99213 OFFICE O/P EST LOW 20 MIN: CPT | Performed by: NURSE PRACTITIONER

## 2017-01-01 PROCEDURE — 99202 OFFICE O/P NEW SF 15 MIN: CPT | Performed by: NURSE PRACTITIONER

## 2017-01-01 PROCEDURE — 80053 COMPREHEN METABOLIC PANEL: CPT

## 2017-01-01 PROCEDURE — 81001 URINALYSIS AUTO W/SCOPE: CPT

## 2017-01-01 PROCEDURE — G8979 MOBILITY GOAL STATUS: HCPCS

## 2017-01-01 PROCEDURE — 84155 ASSAY OF PROTEIN SERUM: CPT

## 2017-01-01 PROCEDURE — 84550 ASSAY OF BLOOD/URIC ACID: CPT

## 2017-01-01 PROCEDURE — 85027 COMPLETE CBC AUTOMATED: CPT

## 2017-01-01 RX ORDER — OXYCODONE HYDROCHLORIDE 5 MG/1
5 TABLET ORAL EVERY 4 HOURS PRN
COMMUNITY

## 2017-01-01 RX ORDER — FAMOTIDINE 20 MG/1
20 TABLET, FILM COATED ORAL DAILY
Status: DISCONTINUED | OUTPATIENT
Start: 2017-01-01 | End: 2017-01-01 | Stop reason: HOSPADM

## 2017-01-01 RX ORDER — LORAZEPAM 2 MG/ML
1 INJECTION INTRAMUSCULAR ONCE
Status: COMPLETED | OUTPATIENT
Start: 2017-01-01 | End: 2017-01-01

## 2017-01-01 RX ORDER — POLYETHYLENE GLYCOL 3350 17 G/17G
17 POWDER, FOR SOLUTION ORAL DAILY
Status: DISCONTINUED | OUTPATIENT
Start: 2017-01-01 | End: 2017-01-01 | Stop reason: HOSPADM

## 2017-01-01 RX ORDER — QUETIAPINE FUMARATE 25 MG/1
25 TABLET, FILM COATED ORAL NIGHTLY
Qty: 60 TABLET | Refills: 3 | Status: SHIPPED | OUTPATIENT
Start: 2017-01-01

## 2017-01-01 RX ORDER — SODIUM CHLORIDE 0.9 % (FLUSH) 0.9 %
10 SYRINGE (ML) INJECTION PRN
Status: DISCONTINUED | OUTPATIENT
Start: 2017-01-01 | End: 2017-01-01 | Stop reason: HOSPADM

## 2017-01-01 RX ORDER — QUETIAPINE FUMARATE 25 MG/1
25 TABLET, FILM COATED ORAL NIGHTLY
Status: DISCONTINUED | OUTPATIENT
Start: 2017-01-01 | End: 2017-01-01 | Stop reason: HOSPADM

## 2017-01-01 RX ORDER — HEPARIN SODIUM 5000 [USP'U]/ML
5000 INJECTION, SOLUTION INTRAVENOUS; SUBCUTANEOUS EVERY 8 HOURS SCHEDULED
Status: DISCONTINUED | OUTPATIENT
Start: 2017-01-01 | End: 2017-01-01

## 2017-01-01 RX ORDER — AZITHROMYCIN 250 MG/1
TABLET, FILM COATED ORAL
Qty: 1 PACKET | Refills: 0 | Status: SHIPPED | OUTPATIENT
Start: 2017-01-01 | End: 2017-01-01 | Stop reason: ALTCHOICE

## 2017-01-01 RX ORDER — LEVOFLOXACIN 500 MG/1
500 TABLET, FILM COATED ORAL DAILY
Qty: 10 TABLET | Refills: 1 | Status: SHIPPED | OUTPATIENT
Start: 2017-01-01 | End: 2017-01-01

## 2017-01-01 RX ORDER — ONDANSETRON 2 MG/ML
4 INJECTION INTRAMUSCULAR; INTRAVENOUS EVERY 6 HOURS PRN
Status: DISCONTINUED | OUTPATIENT
Start: 2017-01-01 | End: 2017-01-01 | Stop reason: HOSPADM

## 2017-01-01 RX ORDER — MAGNESIUM SULFATE 1 G/100ML
1 INJECTION INTRAVENOUS
Status: COMPLETED | OUTPATIENT
Start: 2017-01-01 | End: 2017-01-01

## 2017-01-01 RX ORDER — METHYLPREDNISOLONE 4 MG/1
TABLET ORAL
Qty: 1 KIT | Refills: 0 | Status: SHIPPED | OUTPATIENT
Start: 2017-01-01 | End: 2017-01-01

## 2017-01-01 RX ORDER — ACETAMINOPHEN 325 MG/1
650 TABLET ORAL EVERY 4 HOURS PRN
Status: DISCONTINUED | OUTPATIENT
Start: 2017-01-01 | End: 2017-01-01 | Stop reason: HOSPADM

## 2017-01-01 RX ORDER — ALBUTEROL SULFATE 90 UG/1
2 AEROSOL, METERED RESPIRATORY (INHALATION) EVERY 6 HOURS PRN
Status: DISCONTINUED | OUTPATIENT
Start: 2017-01-01 | End: 2017-01-01 | Stop reason: HOSPADM

## 2017-01-01 RX ORDER — OXYCODONE HCL 40 MG/1
40 TABLET, FILM COATED, EXTENDED RELEASE ORAL EVERY 12 HOURS
COMMUNITY

## 2017-01-01 RX ORDER — BISACODYL 10 MG
10 SUPPOSITORY, RECTAL RECTAL DAILY PRN
Status: DISCONTINUED | OUTPATIENT
Start: 2017-01-01 | End: 2017-01-01 | Stop reason: HOSPADM

## 2017-01-01 RX ORDER — GUAIFENESIN AND CODEINE PHOSPHATE 100; 10 MG/5ML; MG/5ML
5 SOLUTION ORAL 3 TIMES DAILY PRN
Qty: 118 ML | Refills: 0 | Status: SHIPPED | OUTPATIENT
Start: 2017-01-01 | End: 2017-01-01 | Stop reason: SDUPTHER

## 2017-01-01 RX ORDER — OXYCODONE HCL 10 MG/1
10 TABLET, FILM COATED, EXTENDED RELEASE ORAL EVERY 12 HOURS
COMMUNITY
End: 2017-01-01 | Stop reason: DRUGHIGH

## 2017-01-01 RX ORDER — SODIUM CHLORIDE 0.9 % (FLUSH) 0.9 %
10 SYRINGE (ML) INJECTION EVERY 12 HOURS SCHEDULED
Status: DISCONTINUED | OUTPATIENT
Start: 2017-01-01 | End: 2017-01-01 | Stop reason: HOSPADM

## 2017-01-01 RX ORDER — CALCITONIN SALMON 200 [USP'U]/ML
200 INJECTION, SOLUTION INTRAMUSCULAR; SUBCUTANEOUS 2 TIMES DAILY
Status: DISCONTINUED | OUTPATIENT
Start: 2017-01-01 | End: 2017-01-01

## 2017-01-01 RX ORDER — ACYCLOVIR 200 MG/1
400 CAPSULE ORAL DAILY
Status: DISCONTINUED | OUTPATIENT
Start: 2017-01-01 | End: 2017-01-01 | Stop reason: HOSPADM

## 2017-01-01 RX ORDER — OXYCODONE HYDROCHLORIDE 5 MG/1
5 TABLET ORAL EVERY 4 HOURS PRN
Status: DISCONTINUED | OUTPATIENT
Start: 2017-01-01 | End: 2017-01-01 | Stop reason: HOSPADM

## 2017-01-01 RX ORDER — SENNA PLUS 8.6 MG/1
1 TABLET ORAL ONCE
Status: COMPLETED | OUTPATIENT
Start: 2017-01-01 | End: 2017-01-01

## 2017-01-01 RX ORDER — FUROSEMIDE 10 MG/ML
80 INJECTION INTRAMUSCULAR; INTRAVENOUS ONCE
Status: COMPLETED | OUTPATIENT
Start: 2017-01-01 | End: 2017-01-01

## 2017-01-01 RX ORDER — CALCITONIN SALMON 200 [USP'U]/ML
400 INJECTION, SOLUTION INTRAMUSCULAR; SUBCUTANEOUS ONCE
Status: DISCONTINUED | OUTPATIENT
Start: 2017-01-01 | End: 2017-01-01 | Stop reason: SDUPTHER

## 2017-01-01 RX ORDER — BENZONATATE 200 MG/1
200 CAPSULE ORAL 3 TIMES DAILY PRN
Qty: 30 CAPSULE | Refills: 0 | Status: SHIPPED | OUTPATIENT
Start: 2017-01-01 | End: 2017-01-01 | Stop reason: SDUPTHER

## 2017-01-01 RX ORDER — CALCITONIN SALMON 200 [USP'U]/ML
300 INJECTION, SOLUTION INTRAMUSCULAR; SUBCUTANEOUS 2 TIMES DAILY
Status: DISCONTINUED | OUTPATIENT
Start: 2017-01-01 | End: 2017-01-01

## 2017-01-01 RX ORDER — GUAIFENESIN AND CODEINE PHOSPHATE 100; 10 MG/5ML; MG/5ML
5 SOLUTION ORAL EVERY 4 HOURS PRN
Qty: 180 ML | Refills: 0 | Status: SHIPPED | OUTPATIENT
Start: 2017-01-01 | End: 2017-01-01

## 2017-01-01 RX ORDER — CALCITONIN SALMON 200 [USP'U]/ML
400 INJECTION, SOLUTION INTRAMUSCULAR; SUBCUTANEOUS 2 TIMES DAILY
Status: COMPLETED | OUTPATIENT
Start: 2017-01-01 | End: 2017-01-01

## 2017-01-01 RX ORDER — CALCITONIN SALMON 200 [USP'U]/ML
400 INJECTION, SOLUTION INTRAMUSCULAR; SUBCUTANEOUS 2 TIMES DAILY
Status: DISCONTINUED | OUTPATIENT
Start: 2017-01-01 | End: 2017-01-01 | Stop reason: SDUPTHER

## 2017-01-01 RX ORDER — ACYCLOVIR 800 MG/1
400 TABLET ORAL DAILY
Status: DISCONTINUED | OUTPATIENT
Start: 2017-01-01 | End: 2017-01-01 | Stop reason: CLARIF

## 2017-01-01 RX ORDER — LORAZEPAM 0.5 MG/1
0.5 TABLET ORAL EVERY 6 HOURS PRN
Status: DISCONTINUED | OUTPATIENT
Start: 2017-01-01 | End: 2017-01-01

## 2017-01-01 RX ORDER — SODIUM CHLORIDE 9 MG/ML
INJECTION, SOLUTION INTRAVENOUS CONTINUOUS
Status: DISCONTINUED | OUTPATIENT
Start: 2017-01-01 | End: 2017-01-01

## 2017-01-01 RX ORDER — 0.9 % SODIUM CHLORIDE 0.9 %
250 INTRAVENOUS SOLUTION INTRAVENOUS ONCE
Status: COMPLETED | OUTPATIENT
Start: 2017-01-01 | End: 2017-01-01

## 2017-01-01 RX ORDER — SENNA PLUS 8.6 MG/1
2 TABLET ORAL ONCE
Status: DISCONTINUED | OUTPATIENT
Start: 2017-01-01 | End: 2017-01-01

## 2017-01-01 RX ORDER — SENNA PLUS 8.6 MG/1
2 TABLET ORAL DAILY
Status: DISCONTINUED | OUTPATIENT
Start: 2017-01-01 | End: 2017-01-01 | Stop reason: HOSPADM

## 2017-01-01 RX ORDER — LORAZEPAM 2 MG/ML
0.5 INJECTION INTRAMUSCULAR EVERY 6 HOURS PRN
Status: DISCONTINUED | OUTPATIENT
Start: 2017-01-01 | End: 2017-01-01 | Stop reason: HOSPADM

## 2017-01-01 RX ORDER — GUAIFENESIN AND CODEINE PHOSPHATE 100; 10 MG/5ML; MG/5ML
5 SOLUTION ORAL 3 TIMES DAILY PRN
Qty: 118 ML | Refills: 0 | Status: SHIPPED | OUTPATIENT
Start: 2017-01-01 | End: 2017-01-01

## 2017-01-01 RX ORDER — ALBUTEROL SULFATE 90 UG/1
2 AEROSOL, METERED RESPIRATORY (INHALATION) EVERY 6 HOURS PRN
Qty: 1 INHALER | Refills: 0 | Status: SHIPPED | OUTPATIENT
Start: 2017-01-01

## 2017-01-01 RX ORDER — ASPIRIN 81 MG/1
81 TABLET, CHEWABLE ORAL DAILY
COMMUNITY

## 2017-01-01 RX ORDER — ASPIRIN 81 MG/1
81 TABLET, CHEWABLE ORAL DAILY
Status: DISCONTINUED | OUTPATIENT
Start: 2017-01-01 | End: 2017-01-01 | Stop reason: HOSPADM

## 2017-01-01 RX ORDER — CALCITONIN SALMON 200 [USP'U]/ML
400 INJECTION, SOLUTION INTRAMUSCULAR; SUBCUTANEOUS ONCE
Status: COMPLETED | OUTPATIENT
Start: 2017-01-01 | End: 2017-01-01

## 2017-01-01 RX ORDER — ALBUTEROL SULFATE 90 UG/1
2 AEROSOL, METERED RESPIRATORY (INHALATION) EVERY 6 HOURS PRN
Qty: 1 INHALER | Refills: 0 | Status: SHIPPED | OUTPATIENT
Start: 2017-01-01 | End: 2017-01-01 | Stop reason: SDUPTHER

## 2017-01-01 RX ORDER — DIPHENHYDRAMINE HCL 25 MG
25 TABLET ORAL NIGHTLY PRN
Status: DISCONTINUED | OUTPATIENT
Start: 2017-01-01 | End: 2017-01-01 | Stop reason: HOSPADM

## 2017-01-01 RX ORDER — SODIUM CHLORIDE 450 MG/100ML
INJECTION, SOLUTION INTRAVENOUS CONTINUOUS
Status: DISCONTINUED | OUTPATIENT
Start: 2017-01-01 | End: 2017-01-01 | Stop reason: HOSPADM

## 2017-01-01 RX ORDER — OXYCODONE HCL 40 MG/1
40 TABLET, FILM COATED, EXTENDED RELEASE ORAL EVERY 12 HOURS
Status: DISCONTINUED | OUTPATIENT
Start: 2017-01-01 | End: 2017-01-01 | Stop reason: HOSPADM

## 2017-01-01 RX ADMIN — OXYCODONE HYDROCHLORIDE 40 MG: 40 TABLET, FILM COATED, EXTENDED RELEASE ORAL at 09:45

## 2017-01-01 RX ADMIN — OXYCODONE HYDROCHLORIDE 40 MG: 40 TABLET, FILM COATED, EXTENDED RELEASE ORAL at 20:47

## 2017-01-01 RX ADMIN — SODIUM CHLORIDE: 9 INJECTION, SOLUTION INTRAVENOUS at 12:31

## 2017-01-01 RX ADMIN — LORAZEPAM 0.5 MG: 0.5 TABLET ORAL at 13:08

## 2017-01-01 RX ADMIN — ASPIRIN 81 MG 81 MG: 81 TABLET ORAL at 08:29

## 2017-01-01 RX ADMIN — FAMOTIDINE 20 MG: 20 TABLET ORAL at 08:28

## 2017-01-01 RX ADMIN — OXYCODONE HYDROCHLORIDE 40 MG: 40 TABLET, FILM COATED, EXTENDED RELEASE ORAL at 20:10

## 2017-01-01 RX ADMIN — ASPIRIN 81 MG 81 MG: 81 TABLET ORAL at 16:35

## 2017-01-01 RX ADMIN — ACYCLOVIR 400 MG: 200 CAPSULE ORAL at 16:36

## 2017-01-01 RX ADMIN — LORAZEPAM 0.5 MG: 2 INJECTION INTRAMUSCULAR; INTRAVENOUS at 11:49

## 2017-01-01 RX ADMIN — OXYCODONE HYDROCHLORIDE 40 MG: 40 TABLET, FILM COATED, EXTENDED RELEASE ORAL at 08:53

## 2017-01-01 RX ADMIN — LORAZEPAM 1 MG: 2 INJECTION INTRAMUSCULAR; INTRAVENOUS at 13:30

## 2017-01-01 RX ADMIN — DIPHENHYDRAMINE HCL 25 MG: 25 TABLET ORAL at 20:51

## 2017-01-01 RX ADMIN — SODIUM CHLORIDE: 9 INJECTION, SOLUTION INTRAVENOUS at 19:29

## 2017-01-01 RX ADMIN — OXYCODONE HYDROCHLORIDE 40 MG: 40 TABLET, FILM COATED, EXTENDED RELEASE ORAL at 21:08

## 2017-01-01 RX ADMIN — ACYCLOVIR 400 MG: 200 CAPSULE ORAL at 08:36

## 2017-01-01 RX ADMIN — SODIUM CHLORIDE: 9 INJECTION, SOLUTION INTRAVENOUS at 05:04

## 2017-01-01 RX ADMIN — POLYETHYLENE GLYCOL 3350 17 G: 17 POWDER, FOR SOLUTION ORAL at 08:53

## 2017-01-01 RX ADMIN — METOPROLOL TARTRATE 25 MG: 25 TABLET ORAL at 21:56

## 2017-01-01 RX ADMIN — SODIUM CHLORIDE: 4.5 INJECTION, SOLUTION INTRAVENOUS at 06:41

## 2017-01-01 RX ADMIN — OXYCODONE HYDROCHLORIDE 40 MG: 40 TABLET, FILM COATED, EXTENDED RELEASE ORAL at 09:41

## 2017-01-01 RX ADMIN — ASPIRIN 81 MG 81 MG: 81 TABLET ORAL at 08:38

## 2017-01-01 RX ADMIN — METOPROLOL TARTRATE 25 MG: 25 TABLET ORAL at 20:51

## 2017-01-01 RX ADMIN — LORAZEPAM 0.5 MG: 2 INJECTION INTRAMUSCULAR; INTRAVENOUS at 14:05

## 2017-01-01 RX ADMIN — POLYETHYLENE GLYCOL 3350 17 G: 17 POWDER, FOR SOLUTION ORAL at 20:41

## 2017-01-01 RX ADMIN — DIPHENHYDRAMINE HCL 25 MG: 25 TABLET ORAL at 22:17

## 2017-01-01 RX ADMIN — POLYETHYLENE GLYCOL 3350 17 G: 17 POWDER, FOR SOLUTION ORAL at 09:00

## 2017-01-01 RX ADMIN — CALCITONIN SALMON 400 UNITS: 200 INJECTION, SOLUTION INTRAMUSCULAR; SUBCUTANEOUS at 21:08

## 2017-01-01 RX ADMIN — ACYCLOVIR 400 MG: 200 CAPSULE ORAL at 07:49

## 2017-01-01 RX ADMIN — SODIUM CHLORIDE: 9 INJECTION, SOLUTION INTRAVENOUS at 16:13

## 2017-01-01 RX ADMIN — SODIUM CHLORIDE: 9 INJECTION, SOLUTION INTRAVENOUS at 05:09

## 2017-01-01 RX ADMIN — QUETIAPINE FUMARATE 25 MG: 25 TABLET ORAL at 20:19

## 2017-01-01 RX ADMIN — POLYETHYLENE GLYCOL 3350 17 G: 17 POWDER, FOR SOLUTION ORAL at 09:06

## 2017-01-01 RX ADMIN — OXYCODONE HYDROCHLORIDE 40 MG: 40 TABLET, FILM COATED, EXTENDED RELEASE ORAL at 09:47

## 2017-01-01 RX ADMIN — SODIUM CHLORIDE: 4.5 INJECTION, SOLUTION INTRAVENOUS at 17:25

## 2017-01-01 RX ADMIN — OXYCODONE HYDROCHLORIDE 40 MG: 40 TABLET, FILM COATED, EXTENDED RELEASE ORAL at 07:59

## 2017-01-01 RX ADMIN — OXYCODONE HYDROCHLORIDE 40 MG: 40 TABLET, FILM COATED, EXTENDED RELEASE ORAL at 20:50

## 2017-01-01 RX ADMIN — QUETIAPINE FUMARATE 25 MG: 25 TABLET ORAL at 20:28

## 2017-01-01 RX ADMIN — OXYCODONE HYDROCHLORIDE 5 MG: 5 TABLET ORAL at 09:28

## 2017-01-01 RX ADMIN — Medication 10 ML: at 21:36

## 2017-01-01 RX ADMIN — FAMOTIDINE 20 MG: 20 TABLET ORAL at 09:00

## 2017-01-01 RX ADMIN — POLYETHYLENE GLYCOL 3350 17 G: 17 POWDER, FOR SOLUTION ORAL at 08:36

## 2017-01-01 RX ADMIN — Medication 500 UNITS: at 18:49

## 2017-01-01 RX ADMIN — POLYETHYLENE GLYCOL 3350 17 G: 17 POWDER, FOR SOLUTION ORAL at 09:45

## 2017-01-01 RX ADMIN — SODIUM CHLORIDE: 9 INJECTION, SOLUTION INTRAVENOUS at 06:36

## 2017-01-01 RX ADMIN — FAMOTIDINE 20 MG: 20 TABLET ORAL at 09:28

## 2017-01-01 RX ADMIN — FAMOTIDINE 20 MG: 20 TABLET ORAL at 16:35

## 2017-01-01 RX ADMIN — OXYCODONE HYDROCHLORIDE 40 MG: 40 TABLET, FILM COATED, EXTENDED RELEASE ORAL at 09:28

## 2017-01-01 RX ADMIN — SENNOSIDES 8.6 MG: 8.6 TABLET, FILM COATED ORAL at 06:14

## 2017-01-01 RX ADMIN — ZOLEDRONIC ACID 4 MG: 4 INJECTION, SOLUTION, CONCENTRATE INTRAVENOUS at 12:33

## 2017-01-01 RX ADMIN — SENNOSIDES 17.2 MG: 8.6 TABLET, FILM COATED ORAL at 09:41

## 2017-01-01 RX ADMIN — POLYETHYLENE GLYCOL 3350 17 G: 17 POWDER, FOR SOLUTION ORAL at 09:28

## 2017-01-01 RX ADMIN — SODIUM CHLORIDE: 9 INJECTION, SOLUTION INTRAVENOUS at 12:58

## 2017-01-01 RX ADMIN — OXYCODONE HYDROCHLORIDE 40 MG: 40 TABLET, FILM COATED, EXTENDED RELEASE ORAL at 09:27

## 2017-01-01 RX ADMIN — OXYCODONE HYDROCHLORIDE 40 MG: 40 TABLET, FILM COATED, EXTENDED RELEASE ORAL at 08:38

## 2017-01-01 RX ADMIN — QUETIAPINE FUMARATE 25 MG: 25 TABLET ORAL at 20:50

## 2017-01-01 RX ADMIN — ASPIRIN 81 MG 81 MG: 81 TABLET ORAL at 09:27

## 2017-01-01 RX ADMIN — ACYCLOVIR 400 MG: 200 CAPSULE ORAL at 08:53

## 2017-01-01 RX ADMIN — DIPHENHYDRAMINE HCL 25 MG: 25 TABLET ORAL at 20:42

## 2017-01-01 RX ADMIN — OXYCODONE HYDROCHLORIDE 40 MG: 40 TABLET, FILM COATED, EXTENDED RELEASE ORAL at 08:32

## 2017-01-01 RX ADMIN — Medication 10 ML: at 09:28

## 2017-01-01 RX ADMIN — SODIUM CHLORIDE: 9 INJECTION, SOLUTION INTRAVENOUS at 21:51

## 2017-01-01 RX ADMIN — ACETAMINOPHEN 650 MG: 325 TABLET ORAL at 22:17

## 2017-01-01 RX ADMIN — ACYCLOVIR 400 MG: 200 CAPSULE ORAL at 09:06

## 2017-01-01 RX ADMIN — LORAZEPAM 0.5 MG: 2 INJECTION INTRAMUSCULAR; INTRAVENOUS at 13:07

## 2017-01-01 RX ADMIN — POLYETHYLENE GLYCOL 3350 17 G: 17 POWDER, FOR SOLUTION ORAL at 08:29

## 2017-01-01 RX ADMIN — SODIUM CHLORIDE: 4.5 INJECTION, SOLUTION INTRAVENOUS at 17:12

## 2017-01-01 RX ADMIN — OXYCODONE HYDROCHLORIDE 40 MG: 40 TABLET, FILM COATED, EXTENDED RELEASE ORAL at 08:19

## 2017-01-01 RX ADMIN — ASPIRIN 81 MG 81 MG: 81 TABLET ORAL at 09:01

## 2017-01-01 RX ADMIN — SODIUM CHLORIDE: 4.5 INJECTION, SOLUTION INTRAVENOUS at 12:55

## 2017-01-01 RX ADMIN — POLYETHYLENE GLYCOL 3350 17 G: 17 POWDER, FOR SOLUTION ORAL at 09:41

## 2017-01-01 RX ADMIN — ACYCLOVIR 400 MG: 200 CAPSULE ORAL at 09:27

## 2017-01-01 RX ADMIN — SENNOSIDES 17.2 MG: 8.6 TABLET, FILM COATED ORAL at 09:47

## 2017-01-01 RX ADMIN — BISACODYL 10 MG: 10 SUPPOSITORY RECTAL at 16:02

## 2017-01-01 RX ADMIN — ACYCLOVIR 400 MG: 200 CAPSULE ORAL at 09:01

## 2017-01-01 RX ADMIN — SODIUM CHLORIDE: 9 INJECTION, SOLUTION INTRAVENOUS at 10:39

## 2017-01-01 RX ADMIN — ASPIRIN 81 MG 81 MG: 81 TABLET ORAL at 08:36

## 2017-01-01 RX ADMIN — ASPIRIN 81 MG 81 MG: 81 TABLET ORAL at 08:32

## 2017-01-01 RX ADMIN — ACYCLOVIR 400 MG: 200 CAPSULE ORAL at 09:45

## 2017-01-01 RX ADMIN — POLYETHYLENE GLYCOL 3350 17 G: 17 POWDER, FOR SOLUTION ORAL at 09:27

## 2017-01-01 RX ADMIN — OXYCODONE HYDROCHLORIDE 40 MG: 40 TABLET, FILM COATED, EXTENDED RELEASE ORAL at 20:27

## 2017-01-01 RX ADMIN — ASPIRIN 81 MG 81 MG: 81 TABLET ORAL at 09:06

## 2017-01-01 RX ADMIN — SENNOSIDES 17.2 MG: 8.6 TABLET, FILM COATED ORAL at 09:00

## 2017-01-01 RX ADMIN — FAMOTIDINE 20 MG: 20 TABLET ORAL at 09:45

## 2017-01-01 RX ADMIN — SODIUM CHLORIDE: 9 INJECTION, SOLUTION INTRAVENOUS at 00:08

## 2017-01-01 RX ADMIN — SODIUM CHLORIDE: 4.5 INJECTION, SOLUTION INTRAVENOUS at 09:27

## 2017-01-01 RX ADMIN — OXYCODONE HYDROCHLORIDE 40 MG: 40 TABLET, FILM COATED, EXTENDED RELEASE ORAL at 20:40

## 2017-01-01 RX ADMIN — SODIUM CHLORIDE: 9 INJECTION, SOLUTION INTRAVENOUS at 21:36

## 2017-01-01 RX ADMIN — ASPIRIN 81 MG 81 MG: 81 TABLET ORAL at 09:29

## 2017-01-01 RX ADMIN — CALCITONIN SALMON 200 UNITS: 200 INJECTION, SOLUTION INTRAMUSCULAR; SUBCUTANEOUS at 08:38

## 2017-01-01 RX ADMIN — DIPHENHYDRAMINE HCL 25 MG: 25 TABLET ORAL at 21:47

## 2017-01-01 RX ADMIN — FAMOTIDINE 20 MG: 20 TABLET ORAL at 08:36

## 2017-01-01 RX ADMIN — OXYCODONE HYDROCHLORIDE 40 MG: 40 TABLET, FILM COATED, EXTENDED RELEASE ORAL at 20:18

## 2017-01-01 RX ADMIN — OXYCODONE HYDROCHLORIDE 40 MG: 40 TABLET, FILM COATED, EXTENDED RELEASE ORAL at 21:56

## 2017-01-01 RX ADMIN — FAMOTIDINE 20 MG: 20 TABLET ORAL at 08:17

## 2017-01-01 RX ADMIN — OXYCODONE HYDROCHLORIDE 40 MG: 40 TABLET, FILM COATED, EXTENDED RELEASE ORAL at 20:42

## 2017-01-01 RX ADMIN — ACYCLOVIR 400 MG: 200 CAPSULE ORAL at 09:40

## 2017-01-01 RX ADMIN — LORAZEPAM 0.5 MG: 2 INJECTION INTRAMUSCULAR; INTRAVENOUS at 20:05

## 2017-01-01 RX ADMIN — SODIUM CHLORIDE: 9 INJECTION, SOLUTION INTRAVENOUS at 18:15

## 2017-01-01 RX ADMIN — SODIUM CHLORIDE: 9 INJECTION, SOLUTION INTRAVENOUS at 08:32

## 2017-01-01 RX ADMIN — SODIUM CHLORIDE: 9 INJECTION, SOLUTION INTRAVENOUS at 05:19

## 2017-01-01 RX ADMIN — Medication 10 ML: at 07:50

## 2017-01-01 RX ADMIN — POLYETHYLENE GLYCOL 3350 17 G: 17 POWDER, FOR SOLUTION ORAL at 08:38

## 2017-01-01 RX ADMIN — SODIUM CHLORIDE: 4.5 INJECTION, SOLUTION INTRAVENOUS at 22:08

## 2017-01-01 RX ADMIN — ASPIRIN 81 MG 81 MG: 81 TABLET ORAL at 08:53

## 2017-01-01 RX ADMIN — CALCITONIN SALMON 400 UNITS: 200 INJECTION, SOLUTION INTRAMUSCULAR; SUBCUTANEOUS at 12:47

## 2017-01-01 RX ADMIN — CALCITONIN SALMON 400 UNITS: 200 INJECTION, SOLUTION INTRAMUSCULAR; SUBCUTANEOUS at 09:06

## 2017-01-01 RX ADMIN — SODIUM CHLORIDE: 9 INJECTION, SOLUTION INTRAVENOUS at 13:00

## 2017-01-01 RX ADMIN — CALCITONIN SALMON 200 UNITS: 200 INJECTION, SOLUTION INTRAMUSCULAR; SUBCUTANEOUS at 21:00

## 2017-01-01 RX ADMIN — ASPIRIN 81 MG 81 MG: 81 TABLET ORAL at 07:49

## 2017-01-01 RX ADMIN — OXYCODONE HYDROCHLORIDE 40 MG: 40 TABLET, FILM COATED, EXTENDED RELEASE ORAL at 20:00

## 2017-01-01 RX ADMIN — LORAZEPAM 0.5 MG: 0.5 TABLET ORAL at 21:08

## 2017-01-01 RX ADMIN — OXYCODONE HYDROCHLORIDE 40 MG: 40 TABLET, FILM COATED, EXTENDED RELEASE ORAL at 20:41

## 2017-01-01 RX ADMIN — SODIUM CHLORIDE 250 ML: 9 INJECTION, SOLUTION INTRAVENOUS at 10:15

## 2017-01-01 RX ADMIN — SODIUM CHLORIDE: 9 INJECTION, SOLUTION INTRAVENOUS at 19:57

## 2017-01-01 RX ADMIN — OXYCODONE HYDROCHLORIDE 40 MG: 40 TABLET, FILM COATED, EXTENDED RELEASE ORAL at 09:15

## 2017-01-01 RX ADMIN — METOPROLOL TARTRATE 25 MG: 25 TABLET ORAL at 09:40

## 2017-01-01 RX ADMIN — OXYCODONE HYDROCHLORIDE 40 MG: 40 TABLET, FILM COATED, EXTENDED RELEASE ORAL at 10:57

## 2017-01-01 RX ADMIN — FAMOTIDINE 20 MG: 20 TABLET ORAL at 08:38

## 2017-01-01 RX ADMIN — SODIUM CHLORIDE: 9 INJECTION, SOLUTION INTRAVENOUS at 22:17

## 2017-01-01 RX ADMIN — METOPROLOL TARTRATE 25 MG: 25 TABLET ORAL at 20:47

## 2017-01-01 RX ADMIN — CALCITONIN SALMON 200 UNITS: 200 INJECTION, SOLUTION INTRAMUSCULAR; SUBCUTANEOUS at 13:08

## 2017-01-01 RX ADMIN — POLYETHYLENE GLYCOL 3350 17 G: 17 POWDER, FOR SOLUTION ORAL at 08:17

## 2017-01-01 RX ADMIN — METOPROLOL TARTRATE 25 MG: 25 TABLET ORAL at 07:49

## 2017-01-01 RX ADMIN — FAMOTIDINE 20 MG: 20 TABLET ORAL at 08:53

## 2017-01-01 RX ADMIN — CALCITONIN SALMON 300 UNITS: 200 INJECTION, SOLUTION INTRAMUSCULAR; SUBCUTANEOUS at 21:35

## 2017-01-01 RX ADMIN — Medication 10 ML: at 08:19

## 2017-01-01 RX ADMIN — OXYCODONE HYDROCHLORIDE 40 MG: 40 TABLET, FILM COATED, EXTENDED RELEASE ORAL at 21:35

## 2017-01-01 RX ADMIN — DIPHENHYDRAMINE HCL 25 MG: 25 TABLET ORAL at 20:04

## 2017-01-01 RX ADMIN — FUROSEMIDE 80 MG: 10 INJECTION, SOLUTION INTRAMUSCULAR; INTRAVENOUS at 09:27

## 2017-01-01 RX ADMIN — LORAZEPAM 0.5 MG: 0.5 TABLET ORAL at 10:39

## 2017-01-01 RX ADMIN — POLYETHYLENE GLYCOL 3350 17 G: 17 POWDER, FOR SOLUTION ORAL at 07:49

## 2017-01-01 RX ADMIN — METOPROLOL TARTRATE 25 MG: 25 TABLET ORAL at 09:01

## 2017-01-01 RX ADMIN — CALCITONIN SALMON 400 UNITS: 200 INJECTION, SOLUTION INTRAMUSCULAR; SUBCUTANEOUS at 20:01

## 2017-01-01 RX ADMIN — Medication 10 ML: at 20:48

## 2017-01-01 RX ADMIN — FAMOTIDINE 20 MG: 20 TABLET ORAL at 09:27

## 2017-01-01 RX ADMIN — SODIUM CHLORIDE: 4.5 INJECTION, SOLUTION INTRAVENOUS at 16:19

## 2017-01-01 RX ADMIN — METOPROLOL TARTRATE 25 MG: 25 TABLET ORAL at 20:28

## 2017-01-01 RX ADMIN — ACYCLOVIR 400 MG: 200 CAPSULE ORAL at 08:28

## 2017-01-01 RX ADMIN — LORAZEPAM 0.5 MG: 0.5 TABLET ORAL at 20:10

## 2017-01-01 RX ADMIN — SENNOSIDES 17.2 MG: 8.6 TABLET, FILM COATED ORAL at 07:49

## 2017-01-01 RX ADMIN — ACYCLOVIR 400 MG: 200 CAPSULE ORAL at 08:32

## 2017-01-01 RX ADMIN — ASPIRIN 81 MG 81 MG: 81 TABLET ORAL at 08:17

## 2017-01-01 RX ADMIN — SODIUM CHLORIDE: 9 INJECTION, SOLUTION INTRAVENOUS at 04:43

## 2017-01-01 RX ADMIN — LORAZEPAM 0.5 MG: 0.5 TABLET ORAL at 17:32

## 2017-01-01 RX ADMIN — ASPIRIN 81 MG 81 MG: 81 TABLET ORAL at 09:41

## 2017-01-01 RX ADMIN — ACYCLOVIR 400 MG: 200 CAPSULE ORAL at 08:38

## 2017-01-01 RX ADMIN — OXYCODONE HYDROCHLORIDE 5 MG: 5 TABLET ORAL at 18:50

## 2017-01-01 RX ADMIN — QUETIAPINE FUMARATE 25 MG: 25 TABLET ORAL at 20:46

## 2017-01-01 RX ADMIN — FAMOTIDINE 20 MG: 20 TABLET ORAL at 09:41

## 2017-01-01 RX ADMIN — MAGNESIUM HYDROXIDE 30 ML: 400 SUSPENSION ORAL at 09:47

## 2017-01-01 RX ADMIN — ASPIRIN 81 MG 81 MG: 81 TABLET ORAL at 09:45

## 2017-01-01 RX ADMIN — SODIUM CHLORIDE: 9 INJECTION, SOLUTION INTRAVENOUS at 02:54

## 2017-01-01 RX ADMIN — FAMOTIDINE 20 MG: 20 TABLET ORAL at 09:06

## 2017-01-01 RX ADMIN — MAGNESIUM SULFATE HEPTAHYDRATE 1 G: 1 INJECTION, SOLUTION INTRAVENOUS at 12:51

## 2017-01-01 RX ADMIN — ACETAMINOPHEN 650 MG: 325 TABLET ORAL at 20:04

## 2017-01-01 RX ADMIN — MAGNESIUM SULFATE HEPTAHYDRATE 1 G: 1 INJECTION, SOLUTION INTRAVENOUS at 11:49

## 2017-01-01 RX ADMIN — SODIUM CHLORIDE: 9 INJECTION, SOLUTION INTRAVENOUS at 10:38

## 2017-01-01 RX ADMIN — LORAZEPAM 0.5 MG: 2 INJECTION INTRAMUSCULAR; INTRAVENOUS at 16:23

## 2017-01-01 RX ADMIN — ACYCLOVIR 400 MG: 200 CAPSULE ORAL at 08:17

## 2017-01-01 RX ADMIN — OXYCODONE HYDROCHLORIDE 40 MG: 40 TABLET, FILM COATED, EXTENDED RELEASE ORAL at 08:28

## 2017-01-01 RX ADMIN — CALCITONIN SALMON 300 UNITS: 200 INJECTION, SOLUTION INTRAMUSCULAR; SUBCUTANEOUS at 12:32

## 2017-01-01 RX ADMIN — QUETIAPINE FUMARATE 25 MG: 25 TABLET ORAL at 21:56

## 2017-01-01 RX ADMIN — METOPROLOL TARTRATE 25 MG: 25 TABLET ORAL at 08:53

## 2017-01-01 RX ADMIN — FAMOTIDINE 20 MG: 20 TABLET ORAL at 08:32

## 2017-01-01 RX ADMIN — OXYCODONE HYDROCHLORIDE 40 MG: 40 TABLET, FILM COATED, EXTENDED RELEASE ORAL at 21:34

## 2017-01-01 RX ADMIN — Medication 10 ML: at 21:35

## 2017-01-01 RX ADMIN — FAMOTIDINE 20 MG: 20 TABLET ORAL at 07:49

## 2017-01-01 ASSESSMENT — PAIN DESCRIPTION - LOCATION
LOCATION: BACK
LOCATION: OTHER (COMMENT)
LOCATION: BACK
LOCATION: BACK

## 2017-01-01 ASSESSMENT — PAIN SCALES - GENERAL
PAINLEVEL_OUTOF10: 0
PAINLEVEL_OUTOF10: 0
PAINLEVEL_OUTOF10: 6
PAINLEVEL_OUTOF10: 5
PAINLEVEL_OUTOF10: 0
PAINLEVEL_OUTOF10: 5
PAINLEVEL_OUTOF10: 0
PAINLEVEL_OUTOF10: 4
PAINLEVEL_OUTOF10: 0
PAINLEVEL_OUTOF10: 7
PAINLEVEL_OUTOF10: 0
PAINLEVEL_OUTOF10: 8
PAINLEVEL_OUTOF10: 3
PAINLEVEL_OUTOF10: 0
PAINLEVEL_OUTOF10: 6
PAINLEVEL_OUTOF10: 8
PAINLEVEL_OUTOF10: 0
PAINLEVEL_OUTOF10: 4
PAINLEVEL_OUTOF10: 6
PAINLEVEL_OUTOF10: 5
PAINLEVEL_OUTOF10: 8
PAINLEVEL_OUTOF10: 8
PAINLEVEL_OUTOF10: 0
PAINLEVEL_OUTOF10: 4
PAINLEVEL_OUTOF10: 0
PAINLEVEL_OUTOF10: 4
PAINLEVEL_OUTOF10: 0
PAINLEVEL_OUTOF10: 5
PAINLEVEL_OUTOF10: 3
PAINLEVEL_OUTOF10: 0

## 2017-01-01 ASSESSMENT — ENCOUNTER SYMPTOMS
COUGH: 0
NAUSEA: 0
FACIAL SWELLING: 0
VOICE CHANGE: 1
SPUTUM PRODUCTION: 1
WHEEZING: 0
EYE DISCHARGE: 0
RHINORRHEA: 1
VOMITING: 0
COUGH: 1
SHORTNESS OF BREATH: 0
COUGH: 1
SHORTNESS OF BREATH: 0
DIARRHEA: 0
CONSTIPATION: 0
ABDOMINAL PAIN: 0
SHORTNESS OF BREATH: 1
SHORTNESS OF BREATH: 0
VOMITING: 0
EYES NEGATIVE: 1
EYE REDNESS: 0
COLOR CHANGE: 0
ABDOMINAL PAIN: 0
COUGH: 1
WHEEZING: 1
SORE THROAT: 0
DIARRHEA: 0

## 2017-01-01 ASSESSMENT — PAIN DESCRIPTION - PAIN TYPE
TYPE: CHRONIC PAIN
TYPE: ACUTE PAIN
TYPE: ACUTE PAIN

## 2017-01-01 ASSESSMENT — PATIENT HEALTH QUESTIONNAIRE - PHQ9
2. FEELING DOWN, DEPRESSED OR HOPELESS: 1
SUM OF ALL RESPONSES TO PHQ9 QUESTIONS 1 & 2: 0
2. FEELING DOWN, DEPRESSED OR HOPELESS: 0
1. LITTLE INTEREST OR PLEASURE IN DOING THINGS: 1
1. LITTLE INTEREST OR PLEASURE IN DOING THINGS: 0
SUM OF ALL RESPONSES TO PHQ QUESTIONS 1-9: 0
SUM OF ALL RESPONSES TO PHQ QUESTIONS 1-9: 2
SUM OF ALL RESPONSES TO PHQ9 QUESTIONS 1 & 2: 2

## 2017-01-01 ASSESSMENT — PAIN DESCRIPTION - FREQUENCY: FREQUENCY: INTERMITTENT

## 2017-10-06 PROBLEM — C90.00 MULTIPLE MYELOMA (HCC): Chronic | Status: ACTIVE | Noted: 2017-01-01

## 2017-10-06 PROBLEM — D61.818 PANCYTOPENIA (HCC): Status: ACTIVE | Noted: 2017-01-01

## 2017-10-06 PROBLEM — N17.9 ACUTE KIDNEY INJURY (HCC): Status: ACTIVE | Noted: 2017-01-01

## 2017-10-06 NOTE — PROGRESS NOTES
Pharmacy Accuracy Service Medication History Note    The patient's list of current home medications has been reviewed. The patient's allergy list has been reviewed and updated. Source(s) of information: Patient/ Ever (Gene)    Based on information provided by the above source(s), I have updated the patient's home med list as described below. Please review the ACTION REQUESTED BY PHYSICIAN section of this note below for any discrepancies on current hospital orders. I changed or updated the following medications on the patient's home medication list:  Discontinued · Neupogen 300mcg monthly      ·    Adjusted   · Zovirax 400mg from q4hrs to once daily  · Pomalyst 4mg daily , 21 days on, 7 days off. Current cycle started on 9/30. Other Notes · Patient was formerly on [address] [facility]. Last infusion completed in August 2017. PHYSICIAN ACTION REQUESTED  Discrepancies on current hospital orders that need to be addressed by a physician:    Medication Action Requested        Home meds ready for reconciliation. Please feel free to call me with any questions about this encounter. Thank you.     Kalman Gaucher, PharmD  Pharmacy Medication Accuracy Review Service  Phone:  [phone]  Fax: [phone]      Electronically signed by Kalman Gaucher, [facility] on 10/6/2017 at 1:17 PM

## 2017-10-06 NOTE — PROGRESS NOTES
Patients home Rx of chemo medication placed in lock up in the event oncology orders during admission. Order held at this time d/t possibility of nephrotoxicity and SHEKHAR.

## 2017-10-06 NOTE — H&P
[facility]    HISTORY AND PHYSICAL EXAMINATION            Date:   10/6/2017  Patient name:  Preeti Toussaint  Date of admission:  10/6/2017 10:28 AM  MRN:   [id number]  Account:  [de-identified]  YOB: 1955  PCP:    Carmen Art MD  Room:   2799/8794-18  Code Status:    Full Code    Chief Complaint:     Chief Complaint   Patient presents with    Other     voice hoarse slow to respond       History Obtained From:     patient, spouse    History of Present Illness: The patient is a 57 y.o. Non-/non  male who presents with Other (voice hoarse slow to respond)   and he is admitted to the hospital for the management of  Intermittent confusion. This is a pleasant 49-year-old white male with a history of multiple myeloma undergoing active treatment. Over the last couple days he's had episodes of confusion and slurred speech. He denies any fevers or chills, nausea or vomiting, headache or other associated symptoms. He's had some loss of appetite has not been eating and drinking well for the last few days. He presented here has been found to have acute renal failure. He has not had any dysuria or pyuria. He denies any other associated symptoms or modifying factors. No treatment has been tried prior to arrival.        Past Medical History:     Past Medical History:   Diagnosis Date    Multiple myeloma ([address]     Thoracic or lumbosacral neuritis or radiculitis, unspecified 4/26/2013        Past Surgical History:     Past Surgical History:   Procedure Laterality Date    BONE MARROW TRANSPLANT  2/2013    KNEE ARTHROSCOPY Bilateral     KYPHOSIS SURGERY      SHOULDER ARTHROSCOPY Bilateral     TUNNELED VENOUS PORT PLACEMENT          Medications Prior to Admission:     Prior to Admission medications    Medication Sig Start Date End Date Taking?  Authorizing Provider   oxyCODONE (OXYCONTIN) 40 MG extended release tablet Take 40 mg drinking, increase in urination, hot or cold intolerance  MUSCULOSKELETAL:  negative joint pains, muscle aches, swelling of joints  NEUROLOGICAL:  negative for headaches, dizziness, lightheadedness, numbness, pain, tingling extremities, positive for intermittent confusion and slurred speech  BEHAVIOR/PSYCH:  negative for depression, anxiety    Physical Exam:   /63  Pulse 87  Temp 98.4 °F (36.9 °C) (Oral)   Resp 16  Ht 5' 10\" (1.778 m)  Wt 200 lb 2.8 oz (90.8 kg)  SpO2 94%  BMI 28.72 kg/m2  Temp (24hrs), Av.5 °F (36.9 °C), Min:98.4 °F (36.9 °C), Max:98.6 °F (37 °C)    No results for input(s): POCGLU in the last 72 hours. Intake/Output Summary (Last 24 hours) at 10/06/17 1708  Last data filed at 10/06/17 1530   Gross per 24 hour   Intake              240 ml   Output                0 ml   Net              240 ml       General Appearance:  alert, well appearing, and in no acute distress  Mental status: oriented to person, place, and time with normal affect  Head:  normocephalic, atraumatic. Eye: no icterus, redness, pupils equal and reactive, extraocular eye movements intact, conjunctiva clear  Ear: normal external ear, no discharge, hearing intact  Nose:  no drainage noted  Mouth: mucous membranes moist  Neck: supple, no carotid bruits, thyroid not palpable  Lungs: Bilateral equal air entry, clear to ausculation, no wheezing, rales or rhonchi, normal effort  Cardiovascular: normal rate, regular rhythm, no murmur, gallop, rub.   Abdomen: Soft, nontender, nondistended, normal bowel sounds, no hepatomegaly or splenomegaly  Neurologic: There are no new focal motor or sensory deficits, normal muscle tone and bulk, no abnormal sensation, normal speech, cranial nerves II through XII grossly intact  Skin: No gross lesions, rashes, bruising or bleeding on exposed skin area  Extremities:  peripheral pulses palpable, no pedal edema or calf pain with palpation  Psych: normal affect     Osteopathic Examination: Negative in 2 positions    Investigations:      Laboratory Testing:  Recent Results (from the past 24 hour(s))   CBC Auto Differential    Collection Time: 10/06/17 11:02 AM   Result Value Ref Range    WBC 2.4 (L) 3.5 - 11.0 k/uL    RBC 3.10 (L) 4.5 - 5.9 m/uL    Hemoglobin 9.8 (L) 13.5 - 17.5 g/dL    Hematocrit 29.0 (L) 41 - 53 %    MCV 93.6 80 - 100 fL    MCH 31.7 26 - 34 pg    MCHC 33.9 31 - 37 g/dL    RDW 14.8 (H) 11.5 - 14.5 %    Platelets 58 (L) 365 - 400 k/uL    MPV 6.9 6.0 - 12.0 fL    Differential Type NOT REPORTED     WBC Morphology NOT REPORTED     RBC Morphology NOT REPORTED     Platelet Estimate NOT REPORTED     Seg Neutrophils 62 %    Lymphocytes 20 %    Monocytes 12 %    Eosinophils % 2 %    Basophils 0 %    Metamyelocytes 4 (H) 0 %    Segs Absolute 1.48 (L) 1.8 - 7.7 k/uL    Absolute Lymph # 0.48 (L) 1.0 - 4.8 k/uL    Absolute Mono # 0.29 0.2 - 0.8 k/uL    Absolute Eos # 0.05 0.0 - 0.4 k/uL    Basophils # 0.00 0.0 - 0.2 k/uL    Metamyelocytes Absolute 0.10 (H) 0 k/uL    Morphology INCREASED BANDS PRESENT    Comprehensive Metabolic Panel    Collection Time: 10/06/17 11:02 AM   Result Value Ref Range    Glucose 140 (H) 70 - 99 mg/dL    BUN 30 (H) 8 - 23 mg/dL    CREATININE 3.41 (H) 0.70 - 1.20 mg/dL    Bun/Cre Ratio 9 9 - 20    Calcium 11.3 (H) 8.6 - 10.4 mg/dL    Sodium 137 135 - 144 mmol/L    Potassium 4.9 3.7 - 5.3 mmol/L    Chloride 96 (L) 98 - 107 mmol/L    CO2 25 20 - 31 mmol/L    Anion Gap 16 9 - 17 mmol/L    Alkaline Phosphatase 69 40 - 129 U/L    ALT 12 5 - 41 U/L    AST 17 <40 U/L    Total Bilirubin 0.32 0.3 - 1.2 mg/dL    Total Protein 6.4 6.4 - 8.3 g/dL    Alb 4.1 3.5 - 5.2 g/dL    Albumin/Globulin Ratio NOT REPORTED 1.0 - 2.5    GFR Non- 18 (L) >60 mL/min    GFR  22 (L) >60 mL/min    GFR Comment          GFR Staging NOT REPORTED    EKG 12 Lead    Collection Time: 10/06/17 11:02 AM   Result Value Ref Range    Ventricular Rate 95 BPM    Atrial Rate 95 BPM

## 2017-10-06 NOTE — ED PROVIDER NOTES
[address] [place] [address] ED  eMERGENCY dEPARTMENT eNCOUnter      Pt Name: Shyam Leon  MRN: [id number]  Martinagfjustin 1955  Date of evaluation: 10/6/2017  Provider: Sal Pedro MD    CHIEF COMPLAINT       Chief Complaint   Patient presents with    Other     voice hoarse slow to respond         HISTORY OF PRESENT ILLNESS  (Location/Symptom, Timing/Onset, Context/Setting, Quality, Duration, Modifying Factors, Severity.)   Shyam Leon is a 57 y.o. male who presents to the emergency department For not acting himself. Over the past several days or perhaps a week the patient has had progressively worse symptoms. His wife gives most of the history and explained that his hands are shaky and that he hasn't been acting himself. The patient himself doesn't have any physical complaints except his voice is been a bit hoarse for about the last month. He has a history of multiple myeloma and his oncologist is trying to get him in to see an ENT physician about that issue. The patient's wife explains that he is not acting himself and is a bit more irritable than usual and that this has raised her concern about an elevated calcium or something else going on such as infection. His symptoms are continuous. He denies pain. 2 weeks ago he had a PET scan which his wife states was nonspecific. Nursing Notes were reviewed. ALLERGIES     Review of patient's allergies indicates no known allergies. CURRENT MEDICATIONS       Previous Medications    ACYCLOVIR (ZOVIRAX) 400 MG TABLET    Take 400 mg by mouth every 4 hours (while awake)    ALBUTEROL SULFATE HFA (PROAIR HFA) 108 (90 BASE) MCG/ACT INHALER    Inhale 2 puffs into the lungs every 6 hours as needed for Wheezing or Shortness of Breath    ASPIRIN 81 MG CHEWABLE TABLET    Take 81 mg by mouth daily    AZITHROMYCIN (ZITHROMAX Z-CINDY) 250 MG TABLET    Take 2 tablets (500 mg) by mouth today, then take 1 tablet (250 mg) by mouth for the next 4 days.     AZITHROMYCIN (ZITHROMAX) 250 findings:    Interpretation per the Radiologist below, if available at the time of this note:    Xr Chest Standard (2 Vw)    Result Date: 10/6/2017  EXAMINATION: TWO VIEWS OF THE CHEST 10/6/2017 11:26 am COMPARISON: Chest x-ray from 02/28/2016 HISTORY: ORDERING SYSTEM PROVIDED HISTORY: AMS TECHNOLOGIST PROVIDED HISTORY: Reason for exam:->AMS Ordering Physician Provided Reason for Exam: Altered mental status x 3 days. hx of mult myeloma Acuity: Acute Type of Exam: Initial FINDINGS: Left chest port catheter extends to the level of the superior atrial caval junction. There is stable relative elevation of the left hemidiaphragm. There is no focal airspace consolidation, pleural effusion or pneumothorax. The cardiomediastinal silhouette appears within normal limits and there is no pulmonary vascular congestion. Stable chronic fracture deformity of the proximal right clavicle. There are similar moderate hypertrophic degenerative changes of the visualized spine in shoulders. Visualized osseous structures appear moderately demineralized, but grossly intact, given the non dedicated imaging. Stable vertebroplasty changes identified. Stable nonspecific lucent lesion in the mid to distal diaphysis of the left clavicle. No focal airspace consolidation or pulmonary vascular congestion. Ct Head Wo Contrast    Result Date: 10/6/2017  EXAMINATION: CT OF THE HEAD WITHOUT CONTRAST  10/6/2017 11:14 am TECHNIQUE: CT of the head was performed without the administration of intravenous contrast. Dose modulation, iterative reconstruction, and/or weight based adjustment of the mA/kV was utilized to reduce the radiation dose to as low as reasonably achievable. COMPARISON: CT head February 28, 2016 HISTORY: ORDERING SYSTEM PROVIDED HISTORY: Altered mental status, history of multiple myeloma FINDINGS: Limited study due to artifacts.  BRAIN/VENTRICLES: There is mild periventricular white matter low attenuation, likely related to Vitals:    10/06/17 1024 10/06/17 1130   BP: 132/64    Pulse: 102    Resp: 16    Temp: 98.6 °F (37 °C)    SpO2: 92% 93%   Weight: 204 lb (92.5 kg)        No orders of the defined types were placed in this encounter. Medical Decision Making: Patient is found have acute kidney injury and a somewhat elevated calcium level. He is being given IV fluids and is being admitted. Treatment diagnosis and disposition were discussed with the patient and his wife. CONSULTS:  None    PROCEDURES:  None    FINAL IMPRESSION      1. Acute kidney injury ([facility] [address]          DISPOSITION/PLAN   DISPOSITION Decision to Admit    PATIENT REFERRED TO:   No follow-up provider specified.     DISCHARGE MEDICATIONS:     New Prescriptions    No medications on file         (Please note that portions of this note were completed with a voice recognition program.  Efforts were made to edit the dictations but occasionally words are mis-transcribed.)    Roz Peterson MD  Attending Emergency Physician               Roz Peterson MD  10/06/17 3884

## 2017-10-07 NOTE — PROGRESS NOTES
[facility]    Second Visit Note  For more detailed information please refer to the progress note of the day      10/7/2017    5:10 PM    Name:   Emma Thomas  MRN:     [id number]     Acct:      [de-identified]   Room:   4991/3796-55   Day:  1  Admit Date:  10/6/2017 10:28 AM    PCP:   Marnie Parrish MD  Code Status:  Full Code        Pt vitals were reviewed   New labs were reviewed   Patient was seen    Updated plan :     1. Patient intermittently confused and impulsive, some anxiety. Ativan ordered earlier.   2. Discussed with wife, awaiting specialist input        Guera Gutierrez,   10/7/2017  5:10 PM

## 2017-10-07 NOTE — PROGRESS NOTES
Patient's wife present and requests that all 4 bed rails be up when she leaves as she is concerned he will get out of bed; bed alarm remains in place.

## 2017-10-07 NOTE — FLOWSHEET NOTE
Pt in sitting at edge of bed states he can't contact his wife via the phone. Pt don't remember where he had his cell phone.  assisted patient by contacting his and having his contact Pt in his room.  provided ministry of presences and active listening.  provided prayer. Pt was able to speak with his wife. Pt expressed thanks and gratitude.

## 2017-10-07 NOTE — CONSULTS
Consults   Reason for Consult:  Acute kidney injury. Requesting Physician:  Dylan Caceres DO    HISTORY OF PRESENT ILLNESS:    The patient is a 57 y.o. male who presents with mental status changes. On previous labs his serum creatinines have been between 0.6 to 0.9 with eGFR of >60s ml/min. On admission he was noted to have elevated creatinine of 3.41 that has risen slightly to 3.47 today. His calcium level was also high at 11.3 that has improved to 10.2 today with IVF. He has had poor appetite with weight loss for few days. He is on Pomalyst with steroids. He was also taking Advil PM daily for few weeks. Denies any problems with diarrhea or difficulty with urination. Denies any recent use of iv contrast.    Review Of Systems:   Unable to obtain as he is confused. Past Medical History:   Diagnosis Date    Multiple myeloma ([facility] [address]     Thoracic or lumbosacral neuritis or radiculitis, unspecified 4/26/2013       Past Surgical History:   Procedure Laterality Date    BONE MARROW TRANSPLANT  2/2013    KNEE ARTHROSCOPY Bilateral     KYPHOSIS SURGERY      SHOULDER ARTHROSCOPY Bilateral     TUNNELED VENOUS PORT PLACEMENT         Prior to Admission medications    Medication Sig Start Date End Date Taking? Authorizing Provider   oxyCODONE (OXYCONTIN) 40 MG extended release tablet Take 40 mg by mouth every 12 hours  . Yes Keren Bowles DO   oxyCODONE (ROXICODONE) 5 MG immediate release tablet Take 5 mg by mouth every 4 hours as needed for Pain .    Yes Historical Provider, MD   aspirin 81 MG chewable tablet Take 81 mg by mouth daily   Yes Historical Provider, MD   albuterol sulfate HFA (PROAIR HFA) 108 (90 BASE) MCG/ACT inhaler Inhale 2 puffs into the lungs every 6 hours as needed for Wheezing or Shortness of Breath 4/12/17  Yes Lea Mcghee MD   pomalidomide (POMALYST) 4 MG capsule Take 4 mg by mouth daily Indications: 21 days on, 7 days off cycle On empty stomach   Yes Historical Provider, MD   acyclovir to palpation. Active bowel sounds x 4 quadrants. Musculoskeletal: Active ROM x 4 extremities. No cyanosis or clubbing. Integumentary: Pink, warm and dry. Free from rash or lesions. Skin turgor normal.  CNS: Face symmetrical. No tremor.      Data:  CBC:   Lab Results   Component Value Date    WBC 1.2 (LL) 10/07/2017    HGB 8.2 (L) 10/07/2017    HCT 24.6 (L) 10/07/2017    MCV 93.1 10/07/2017    PLT 43 (L) 10/07/2017     BMP:    Lab Results   Component Value Date     10/07/2017     10/06/2017     03/01/2016    K 5.1 10/07/2017    K 4.9 10/06/2017    K 4.5 03/01/2016     10/07/2017    CL 96 (L) 10/06/2017     03/01/2016    CO2 25 10/07/2017    CO2 25 10/06/2017    CO2 24 03/01/2016    BUN 30 (H) 10/07/2017    BUN 30 (H) 10/06/2017    BUN 11 03/01/2016    CREATININE 3.47 (H) 10/07/2017    CREATININE 3.41 (H) 10/06/2017    CREATININE 0.67 (L) 03/01/2016    GLUCOSE 105 (H) 10/07/2017    GLUCOSE 140 (H) 10/06/2017    GLUCOSE 125 (H) 03/01/2016     CMP:   Lab Results   Component Value Date     10/07/2017    K 5.1 10/07/2017     10/07/2017    CO2 25 10/07/2017    BUN 30 10/07/2017    CREATININE 3.47 10/07/2017    GLUCOSE 105 10/07/2017    CALCIUM 10.2 10/07/2017    PROT 6.4 10/06/2017    LABALBU 4.1 10/06/2017    BILITOT 0.32 10/06/2017    ALKPHOS 69 10/06/2017    AST 17 10/06/2017    ALT 12 10/06/2017      Hepatic:   Lab Results   Component Value Date    AST 17 10/06/2017    AST 19 02/28/2016    ALT 12 10/06/2017    ALT 20 02/28/2016    BILITOT 0.32 10/06/2017    BILITOT 0.42 02/28/2016    ALKPHOS 69 10/06/2017    ALKPHOS 62 02/28/2016     BNP: No results found for: BNP  Lipids: No results found for: CHOL, HDL  INR: No results found for: INR  PTH: No results found for: PTH  Phosphorus:  No results found for: PHOS  Ionized Calcium: No results found for: IONCA  Magnesium: No results found for: MG  Albumin:   Lab Results   Component Value Date    LABAL 4.1 10/06/2017     Last 3 CK, CKMB, Troponin: @LABRCNT(CKTOTAL:3,CKMB:3,TROPONINI:3)       URINE:)No results found for: Natanael Davey     Radiology:   Reviewed. Assessment:  1. Acute kidney injury, appears to be hemodynamically related. 2. Hypercalcemia. 3. Hyperkalemia, secondary to SHEKHAR. 4. Anemia. 5. Multiple myeloma. Plan:  1. Agree with IVF as ordered. 2. Comprehensive urine testing including urinalysis, urine sodium, creatinine (FENa), eosinophils and urine protein and creatinine to quantify any proteinuria will be ordered. 3. Renal diet. 4. Avoid hypotension, nephrotoxic drugs, Lovenox, Fleets enema and IV contrast exposure. 5. Medications adjusted for low GFR. 6. Follow up chemistries ordered for later today and for AM.    Thank you for the consultation. Please do not hesitate to contact us for any further questions/concerns. We will continue to follow along with you. Electronically signed by Kenny Riley MD  on 10/7/2017 at 6:26 PM   [facility].   Ph: [phone]

## 2017-10-07 NOTE — PLAN OF CARE
Problem: Falls - Risk of  Goal: Absence of falls  Outcome: Ongoing      Problem: Pain:  Goal: Pain level will decrease  Pain level will decrease   Outcome: Ongoing      Problem: Safety:  Goal: Free from accidental physical injury  Free from accidental physical injury  Outcome: Ongoing      Problem: Daily Care:  Goal: Daily care needs are met  Daily care needs are met  Outcome: Ongoing      Problem: Discharge Planning:  Goal: Patients continuum of care needs are met  Patients continuum of care needs are met  Outcome: Ongoing

## 2017-10-07 NOTE — PROGRESS NOTES
[facility]    Progress Note    10/7/2017    8:37 AM    Name:   Shawanda Smart  MRN:     [id number]     Acct:      [de-identified]   Room:   [address] [place] [address] Day:  1  Admit Date:  10/6/2017 10:28 AM    PCP:   Liban Leroy MD  Code Status:  Full Code    Subjective:     C/C:   Chief Complaint   Patient presents with    Other     voice hoarse slow to respond     Interval History Status: improved. Patient has no acute complaints and mentation seems to have improved. Brief History: This is a 52-year-old white male was admitted with intermittent confusion and speech problems. He is found have acute renal failure in the emergency room and has been admitted to the hospital.  He's been treated with IV fluids and nephrology consultation. He has a history of multiple myeloma and has been treated with chemotherapy, oncology evaluation to assist in management    Review of Systems:     Constitutional:  negative for chills, fevers, sweats  Respiratory:  negative for cough, dyspnea on exertion, shortness of breath, wheezing  Cardiovascular:  negative for chest pain, chest pressure/discomfort, lower extremity edema, palpitations  Gastrointestinal:  negative for abdominal pain, constipation, diarrhea, nausea, vomiting  Neurological:  negative for dizziness, headache    Medications:      Allergies:  No Known Allergies    Current Meds:   Scheduled Meds:    aspirin  81 mg Oral Daily    oxyCODONE  40 mg Oral Q12H    sodium chloride flush  10 mL Intravenous 2 times per day    famotidine  20 mg Oral Daily    acyclovir  400 mg Oral Daily     Continuous Infusions:    sodium chloride 150 mL/hr at 10/07/17 0832     PRN Meds: albuterol sulfate HFA, oxyCODONE, sodium chloride flush, acetaminophen, magnesium hydroxide, ondansetron, diphenhydrAMINE    Data:     Past Medical History:   has a past medical history of Multiple myeloma ([facility] [address] and Thoracic or lumbosacral neuritis or radiculitis, unspecified. Social History:   reports that he has never smoked. He has quit using smokeless tobacco. He reports that he drinks alcohol. He reports that he does not use drugs. Family History:   Family History   Problem Relation Age of Onset    Other Father      \"heart issues\"   Js Re Cancer Mother      lung       Vitals:  BP (!) 108/56   Pulse 78   Temp 97.7 °F (36.5 °C) (Oral)   Resp 14   Ht 5' 10\" (1.778 m)   Wt 200 lb 2.8 oz (90.8 kg)   SpO2 94%   BMI 28.72 kg/m²   Temp (24hrs), Av.2 °F (36.8 °C), Min:97.7 °F (36.5 °C), Max:98.6 °F (37 °C)    No results for input(s): POCGLU in the last 72 hours. I/O (24Hr):     Intake/Output Summary (Last 24 hours) at 10/07/17 0837  Last data filed at 10/07/17 0556   Gross per 24 hour   Intake             2837 ml   Output             2025 ml   Net              812 ml       Labs:    Hematology:  Recent Labs      10/06/17   1102  10/07/17   0619   WBC  2.4*  1.2*   RBC  3.10*  2.64*   HGB  9.8*  8.2*   HCT  29.0*  24.6*   MCV  93.6  93.1   MCH  31.7  31.2   MCHC  33.9  33.6   RDW  14.8*  14.0   PLT  58*  43*   MPV  6.9  NOT REPORTED     Chemistry:  Recent Labs      10/06/17   1102  10/07/17   0619   NA  137  137   K  4.9  5.1   CL  96*  103   CO2  25  25   GLUCOSE  140*  105*   BUN  30*  30*   CREATININE  3.41*  3.47*   ANIONGAP  16  9   LABGLOM  18*  18*   GFRAA  22*  22*   CALCIUM  11.3*  10.2     Recent Labs      10/06/17   1102   PROT  6.4   LABALBU  4.1   AST  17   ALT  12   ALKPHOS  69   BILITOT  0.32         Lab Results   Component Value Date/Time    SPECIAL NOT REPORTED 2016 11:34 PM     Lab Results   Component Value Date/Time    CULTURE NO GROWTH 2016 11:34 PM    CULTURE  2016 11:34 PM     Performed at [facility], [address] [place] [address] [phone]       No results found for: POCPH, PHART, PH, POCPCO2, HCJ1FNG, PCO2, POCPO2, PO2ART, PO2, POCHCO3, ZYO0RWE, HCO3, NBEA, PBEA, BEART, BE, THGBART, THB, MZT2WCX, HCZO1ZIG, B9ZVMVGE, O2SAT, FIO2    Radiology:    Renal ultrasound without acute pathology    Physical Examination:        General appearance:  alert, cooperative and no distress  Mental Status:  oriented to person, place and time and normal affect  Lungs:  clear to auscultation bilaterally, normal effort  Heart:  regular rate and rhythm, no murmur  Abdomen:  soft, nontender, nondistended, normal bowel sounds, no masses, hepatomegaly, splenomegaly  Extremities:  no edema, redness, tenderness in the calves  Skin:  no gross lesions, rashes, induration    Assessment:        Primary Problem  Acute kidney injury [facility])    Active Hospital Problems    Diagnosis Date Noted    Acute encephalopathy [G93.40] 02/29/2016     Priority: High    Acute kidney injury ([facility] [address] [N17.9] 10/06/2017    Pancytopenia ([facility] [address] [D61.818] 10/06/2017    Multiple myeloma ([facility] [address] [C90.00] 10/06/2017       Plan:        1. Continue present medications  2. Await nephrology and oncology input  3. DVT prophylaxis, no chemical prophylaxis due to thrombocytopenia. 4. Follow labs  5. Continue IV hydration  6.  Questions answered    Rosie Baez DO  10/7/2017  8:37 AM

## 2017-10-08 NOTE — PROGRESS NOTES
10/08/2017    K 5.3 10/08/2017     10/08/2017    CO2 23 10/08/2017    BUN 31 (H) 10/08/2017    CREATININE 3.90 (H) 10/08/2017    GLUCOSE 97 10/08/2017    CALCIUM 11.0 (H) 10/08/2017    PROT 6.4 10/06/2017    LABALBU 4.1 10/06/2017    BILITOT 0.32 10/06/2017    ALKPHOS 69 10/06/2017    AST 17 10/06/2017    ALT 12 10/06/2017      Hepatic:   Lab Results   Component Value Date    AST 17 10/06/2017    ALT 12 10/06/2017    BILITOT 0.32 10/06/2017    ALKPHOS 69 10/06/2017     BNP: No results found for: BNP  Lipids: No results found for: CHOL, HDL  INR: No results found for: INR  PTH: No results found for: PTH  Phosphorus:    Lab Results   Component Value Date    PHOS 5.5 (H) 10/08/2017     Ionized Calcium: No results found for: IONCA  Magnesium:   Lab Results   Component Value Date    MG 1.9 10/08/2017     Albumin:   Lab Results   Component Value Date    LABALBU 4.1 10/06/2017     Last 3 CK, CKMB, Troponin: @LABRCNT(CKTOTAL:3,CKMB:3,TROPONINI:3)       URINE:)No results found for: [place]    Radiology:   Reviewed. Assessment:  1. Acute kidney injury, he was taking NSAIDs, FeNa was 2.49%. 2. Hypercalcemia. 3. Hyperkalemia, secondary to SHEKHAR. 4. Pancytopenia. 5. Multiple myeloma, ? progressing. Plan:  1. Agree with IVF as ordered. 2. Will give lasix 80 mg iv x 1 dose  To see if it helps increase renal calcium clearance. 3. Renal diet. 4. Avoid hypotension, nephrotoxic drugs, Lovenox, Fleets enema and IV contrast exposure. 5. Follow up labs ordered for later today and for AM.     Please do not hesitate to call with questions. Electronically signed by Anuj Vallecillo MD  on 10/8/2017 at 8:49 AM  [facility].   Ph: [phone]

## 2017-10-08 NOTE — PROGRESS NOTES
CULTURE NO GROWTH 02/28/2016 11:34 PM    CULTURE  02/28/2016 11:34 PM     Performed at [facility], [address] [place] [address] [phone]       No results found for: POCPH, PHART, PH, POCPCO2, COY5NAH, PCO2, POCPO2, PO2ART, PO2, POCHCO3, RYB1KET, HCO3, NBEA, PBEA, BEART, BE, THGBART, THB, TYS9OOT, PISI1BTR, O6ZPQKNL, O2SAT, FIO2    Radiology:    No new radiology reports    Physical Examination:        General appearance:  alert, cooperative and no distress  Mental Status:  oriented to person, place and time and normal affect  Lungs:  clear to auscultation bilaterally, normal effort  Heart:  regular rate and rhythm, no murmur  Abdomen:  soft, nontender, nondistended, normal bowel sounds, no masses, hepatomegaly, splenomegaly  Extremities:  no edema, redness, tenderness in the calves  Skin:  no gross lesions, rashes, induration    Assessment:        Primary Problem  Acute kidney injury [facility])    Active Hospital Problems    Diagnosis Date Noted    Acute encephalopathy [G93.40] 02/29/2016     Priority: High    Acute kidney injury ([address] [N17.9] 10/06/2017    Pancytopenia ([address] [D61.818] 10/06/2017    Multiple myeloma ([address] [C90.00] 10/06/2017       Plan:        1. Continue fluids as ordered  2. Lasix 1 time dose today per nephrology  3. GI and DVT prophylaxis  4. Oncology evaluation noted  5. Follow labs  6.  Continue supportive measures    Broderick Hamman, DO  10/8/2017  8:33 AM

## 2017-10-08 NOTE — PLAN OF CARE
Problem: Falls - Risk of  Goal: Absence of falls  Outcome: Ongoing  Pt determined by Kiley Oleary fall risk assessment to be a fall risk; falling star, ID band and safety alarm activated and in use as needed. Hourly rounding performed, call light use encouraged; personal items within reach. Bed locked in low position.

## 2017-10-08 NOTE — PLAN OF CARE
Problem: Falls - Risk of  Goal: Absence of falls  Outcome: Ongoing  Pt fall risk, fall band present, falling star, safety alarm activated and in use as needed. Hourly rounding performed. Pt encouraged to use call light. See Kiley Oleary fall risk assessment.

## 2017-10-09 PROBLEM — G93.40 ACUTE ENCEPHALOPATHY: Status: ACTIVE | Noted: 2017-01-01

## 2017-10-09 NOTE — PROGRESS NOTES
PHOS  5.5*  4.8*     MAGNESIUM:   Recent Labs      10/08/17   0633  10/09/17   0705   MG  1.9  1.8     SPEP: Lab Results   Component Value Date    PROT 6.4 10/06/2017     URINE SODIUM:    Lab Results   Component Value Date    KECIA 51 10/06/2017      URINE CREATININE:  Lab Results   Component Value Date    LABCREA 52.9 10/07/2017       URINALYSIS:  U/A: Lab Results   Component Value Date    NITRU NEGATIVE 10/06/2017    COLORU YELLOW 10/06/2017    PHUR 5.5 10/06/2017    WBCUA 0 TO 2 10/06/2017    RBCUA 2 TO 5 10/06/2017    MUCUS 1+ 10/06/2017    TRICHOMONAS NOT REPORTED 10/06/2017    YEAST NOT REPORTED 10/06/2017    BACTERIA FEW 10/06/2017    SPECGRAV 1.020 10/06/2017    LEUKOCYTESUR NEGATIVE 10/06/2017    UROBILINOGEN Normal 10/06/2017    BILIRUBINUR NEGATIVE 10/06/2017    GLUCOSEU NEGATIVE 10/06/2017    KETUA NEGATIVE 10/06/2017    AMORPHOUS NOT REPORTED 10/06/2017         ASSESSMENT      1. SHEKHAR :Possible LCDD . He is clearly nonoliguric at this time. Do not have any compelling reason to initiate dialysis at this time. 2.  History of multiple myeloma  3. Hypercalcemia: I would continue normal saline at 125 ML an Hour. Will treat with calcitonin. I'm just avoiding bisphosphonates because of his AK I. This well might be the reason for his altered mental status as well  4. Pancytopenia: Followed by hematology  5. Multiple myeloma: Feel there may be progression    PLAN      1. Normal saline at 125 mL an hour   2. We will start him on calcitonin  3. We will see how his labs look over the next 2448 hrs., he may even need a renal biopsy if no renal improvement noted. .   4.  Hematology's opinion was noted       Please do not hesitate to call with questions.     Electronically signed by Anat Bobo MD on 10/9/2017 at 12:09 PM

## 2017-10-09 NOTE — PROGRESS NOTES
AM assessment completed. Reviewed fall precautions, use of call light. INformed of bed alarm and also discussed use of epc cuffs. Pt is a little slow to respond, states not aware of any confusion nor any improvement since hospitalization. UPdated on current plan.

## 2017-10-09 NOTE — PROGRESS NOTES
Date:                           10/8/2017  Patient name:           Gunjan Merritt  Date of admission:  10/6/2017 10:28 AM  MRN:   [id number]  YOB: 1955  PCP:                           Elizabeth Blake MD      Subjective:       Patient seen and examined. Labs in vitals personally reviewed. Patient on seeing questions. Complains of being tired. Patient's family thinks his mentation is not back to baseline. No fever noted. Patient complains of swelling in his feet. REVIEW OF SYSTEMS:     Constitutional:  No fever or chills. No night sweats, no weight loss, mild confusion. Eyes:  No eye discharge, double vision, or eye pain             HEENT:  negative for  hearing loss, tinnitus, ear drainage, earaches,  epistaxis,  sore mouth, sore throat, hoarseness and voice change  Respiratory:  negative for  , cough , sputum, dyspnea, wheezing, hemoptysis, chest pain  Cardiovascular:  negative for  chest pain, dyspnea, palpitations, orthopnea, PND,   edema, syncope  Gastrointestinal:  negative for nausea, vomiting, diarrhea, constipation, abdominal pain,  Dysphagia, hematemesis and hematochezia  Genitourinary:  negative for frequency, dysuria, nocturia, urinary incontinence,  and hematuria  Integument/Breast:  negative for rash, skin lesions, bruises.   Hematologic/Lymphatic:  negative for easy bruising, bleeding, lymphadenopathy, petechiae and swelling/edema  Allergic/Immunologic:  negative for recurrent infections, urticaria, hay fever, angioedema, anaphylaxis   Endocrine:  negative for heat or cold intolerance, tremor, weight changes, change in bowel habits and hair loss  Musculoskeletal:  negative for  myalgias, arthralgias, pain, joint swelling,and bone pain  Neurological: + confusion,  negative for headaches, dizziness, seizures, weakness, numbness, syncope, near syncope, pain and tingling  Behavior/Psych:  negative for fatigue and anxiety          Objective:     Vitals: BP 108/69   Pulse 82   Temp 98.4 °F (36.9 °C) (Oral)   Resp 18   Ht 5' 10\" (1.778 m)   Wt 200 lb 3 oz (90.8 kg)   SpO2 97%   BMI 28.72 kg/m²   General appearance - well appearing, no in pain or distress  Mental status - mildly confused,   Eyes - pupils equal and reactive, extraocular eye movements intact  Ears - bilateral TM's and external ear canals normal  Nose - normal and patent, no erythema, discharge or polyps  Mouth - mucous membranes moist, pharynx normal without lesions  Neck - supple, no significant adenopathy  Lymphatics - no palpable lymphadenopathy, no hepatosplenomegaly  Chest - clear to auscultation, no wheezes, rales or rhonchi, symmetric air entry  Heart - normal rate, regular rhythm, normal S1, S2, no murmurs, rubs, clicks or gallops  Abdomen - soft, nontender, nondistended, no masses or organomegaly  Neurological - confused. normal speech, no focal findings or movement disorder noted  Musculoskeletal - no joint tenderness, deformity or swelling  Extremities - peripheral pulses normal, no pedal edema, no clubbing or cyanosis  Skin - normal coloration and turgor, no rashes, no suspicious skin lesions noted           Data:    I/O this shift:  In: 1737 [I.V.:1737]  Out: 1200 [Urine:1200]  In: 4265 [P.O.:720; I.V.:3545]  Out: 6770 [ROWUI:1411]    CBC:   Recent Labs      10/06/17   1102  10/07/17   0619  10/08/17   0633   WBC  2.4*  1.2*  1.2*   HGB  9.8*  8.2*  8.5*   PLT  58*  43*  43*     BMP:    Recent Labs      10/07/17   1928  10/08/17   0633  10/08/17   1903   NA  138  141  139   K  4.9  5.3  4.5   CL  104  106  102   CO2  20  23  23   BUN  30*  31*  30*   CREATININE  3.63*  3.90*  3.87*   GLUCOSE  98  97  111*     Hepatic:   Recent Labs      10/06/17   1102   AST  17   ALT  12   BILITOT  0.32   ALKPHOS  69     INR: No results for input(s): INR in the last 72 hours. PTT:No results for input(s): PTT in the last 72 hours.     Results for orders placed or performed during the hospital encounter of 10/06/17   CBC Auto Differential   Result Value Ref Range    WBC 2.4 (L) 3.5 - 11.0 k/uL    RBC 3.10 (L) 4.5 - 5.9 m/uL    Hemoglobin 9.8 (L) 13.5 - 17.5 g/dL    Hematocrit 29.0 (L) 41 - 53 %    MCV 93.6 80 - 100 fL    MCH 31.7 26 - 34 pg    MCHC 33.9 31 - 37 g/dL    RDW 14.8 (H) 11.5 - 14.5 %    Platelets 58 (L) 954 - 400 k/uL    MPV 6.9 6.0 - 12.0 fL    Differential Type NOT REPORTED     WBC Morphology NOT REPORTED     RBC Morphology NOT REPORTED     Platelet Estimate NOT REPORTED     Seg Neutrophils 62 %    Lymphocytes 20 %    Monocytes 12 %    Eosinophils % 2 %    Basophils 0 %    Metamyelocytes 4 (H) 0 %    Segs Absolute 1.48 (L) 1.8 - 7.7 k/uL    Absolute Lymph # 0.48 (L) 1.0 - 4.8 k/uL    Absolute Mono # 0.29 0.2 - 0.8 k/uL    Absolute Eos # 0.05 0.0 - 0.4 k/uL    Basophils # 0.00 0.0 - 0.2 k/uL    Metamyelocytes Absolute 0.10 (H) 0 k/uL    Morphology INCREASED BANDS PRESENT    Comprehensive Metabolic Panel   Result Value Ref Range    Glucose 140 (H) 70 - 99 mg/dL    BUN 30 (H) 8 - 23 mg/dL    CREATININE 3.41 (H) 0.70 - 1.20 mg/dL    Bun/Cre Ratio 9 9 - 20    Calcium 11.3 (H) 8.6 - 10.4 mg/dL    Sodium 137 135 - 144 mmol/L    Potassium 4.9 3.7 - 5.3 mmol/L    Chloride 96 (L) 98 - 107 mmol/L    CO2 25 20 - 31 mmol/L    Anion Gap 16 9 - 17 mmol/L    Alkaline Phosphatase 69 40 - 129 U/L    ALT 12 5 - 41 U/L    AST 17 <40 U/L    Total Bilirubin 0.32 0.3 - 1.2 mg/dL    Total Protein 6.4 6.4 - 8.3 g/dL    Alb 4.1 3.5 - 5.2 g/dL    Albumin/Globulin Ratio NOT REPORTED 1.0 - 2.5    GFR Non- 18 (L) >60 mL/min    GFR  22 (L) >60 mL/min    GFR Comment          GFR Staging NOT REPORTED    Urinalysis with microscopic   Result Value Ref Range    Color, UA YELLOW YEL    Turbidity UA CLEAR CLEAR    Glucose, Ur NEGATIVE NEG    Bilirubin Urine NEGATIVE NEG    Ketones, Urine NEGATIVE NEG    Specific Gravity, UA 1.020 1.005 - 1.030    Urine Hgb 1+ (A) NEG    pH, UA 5.5 5.0 - 8.0    Protein, UA 127 39 - 308 U/L   Basic Metabolic Panel   Result Value Ref Range    Glucose 111 (H) 70 - 99 mg/dL    BUN 30 (H) 8 - 23 mg/dL    CREATININE 3.87 (H) 0.70 - 1.20 mg/dL    Bun/Cre Ratio 8 (L) 9 - 20    Calcium 11.2 (H) 8.6 - 10.4 mg/dL    Sodium 139 135 - 144 mmol/L    Potassium 4.5 3.7 - 5.3 mmol/L    Chloride 102 98 - 107 mmol/L    CO2 23 20 - 31 mmol/L    Anion Gap 14 9 - 17 mmol/L    GFR Non-African American 16 (L) >60 mL/min    GFR  19 (L) >60 mL/min    GFR Comment          GFR Staging NOT REPORTED    Uric Acid   Result Value Ref Range    Uric Acid 8.1 (H) 3.4 - 7.0 mg/dL   EKG 12 Lead   Result Value Ref Range    Ventricular Rate 95 BPM    Atrial Rate 95 BPM    P-R Interval 142 ms    QRS Duration 128 ms    Q-T Interval 352 ms    QTc Calculation (Bazett) 442 ms    P Axis 39 degrees    R Axis -27 degrees    T Axis 38 degrees     Xr Chest Standard (2 Vw)    Result Date: 10/6/2017  EXAMINATION: TWO VIEWS OF THE CHEST 10/6/2017 11:26 am COMPARISON: Chest x-ray from 02/28/2016 HISTORY: ORDERING SYSTEM PROVIDED HISTORY: AMS TECHNOLOGIST PROVIDED HISTORY: Reason for exam:->AMS Ordering Physician Provided Reason for Exam: Altered mental status x 3 days. hx of mult myeloma Acuity: Acute Type of Exam: Initial FINDINGS: Left chest port catheter extends to the level of the superior atrial caval junction. There is stable relative elevation of the left hemidiaphragm. There is no focal airspace consolidation, pleural effusion or pneumothorax. The cardiomediastinal silhouette appears within normal limits and there is no pulmonary vascular congestion. Stable chronic fracture deformity of the proximal right clavicle. There are similar moderate hypertrophic degenerative changes of the visualized spine in shoulders. Visualized osseous structures appear moderately demineralized, but grossly intact, given the non dedicated imaging. Stable vertebroplasty changes identified.   Stable nonspecific lucent lesion in the URINARY BLADDER 10/6/2017 COMPARISON: None HISTORY: ORDERING SYSTEM PROVIDED HISTORY: acute renal failure 52-year-old male with acute renal failure and painful urination for 1 month FINDINGS: Kidneys: Right kidney measures 12.9 by 5.1 x 5.3 cm in greatest longitudinal, AP, and transverse dimensions for the total right renal volume of 180.6 mL. Right renal cortical thickness measures up to 1.6 cm. Left kidney measures 11.1 by 6.6 x 4.9 cm in greatest longitudinal, AP, and transverse dimensions for the total left renal volume of 188.1 mL. Left renal cortical thickness measures up to 2.1 cm. Bilateral corticomedullary differentiation is well-maintained. Color flow projects over the bilateral renal parenchyma. No hydronephrosis. No perinephric fluid. Bladder: Prevoid urinary bladder measures 10.5 x 8.5 x 8.9 cm in greatest longitudinal, AP, and transverse dimensions for the total urinary bladder volume of 521.8 mL. Postvoid urinary bladder measures 3.7 x 6.4 x 6.4 cm for a total postvoid urinary bladder volume of 79.5 mL.     1. Unremarkable renal ultrasound without hydronephrosis. 2. Moderate postvoid residual of the urinary bladder.          Problem Lists:   Primary Problem:  Acute kidney injury [facility])   Current Problems:  Active Hospital Problems    Diagnosis Date Noted    Acute encephalopathy [G93.40] 02/29/2016     Priority: High    Acute kidney injury ([address] [N17.9] 10/06/2017    Pancytopenia ([address] [D61.818] 10/06/2017    Multiple myeloma ([address] [C90.00] 10/06/2017     PMH:  Past Medical History:   Diagnosis Date    Multiple myeloma ([address]     Thoracic or lumbosacral neuritis or radiculitis, unspecified 4/26/2013      Allergies: No Known Allergies     Assessment      Chief Complaint   Patient presents with    Other     voice hoarse slow to respond     Patient Active Problem List   Diagnosis    Psychiatric diagnosis    Thoracic or lumbosacral neuritis or radiculitis, unspecified    Cancer ([address]   

## 2017-10-09 NOTE — FLOWSHEET NOTE
Patient receives Sacrament of the Sick (anointing) from [facility]. [facility] will follow as needed. 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 Janay Gomes)

## 2017-10-09 NOTE — PROGRESS NOTES
Nutrition Assessment    Type and Reason for Visit: Initial, Positive Nutrition Screen (MST=2)    Nutrition Recommendations: 1. Suggest continuing on renal diet. 2. Consider ordering Ensure Clear (Calcium Content=0 mg) ONS x 3/day. Malnutrition Assessment:  · Malnutrition Status: Mild Malnutrition  · Findings of the 6 clinical characteristics of malnutrition (Minimum of 2 out of 6 clinical characteristics is required to make the diagnosis of moderate or severe Protein Calorie Malnutrition based on AND/ASPEN Guidelines):  1. Energy Intake-Less than or equal to 50%, greater than or equal to 5 days    2. Weight Loss-2% loss or greater, in 6 months  3. Fat Loss-Unable to assess  4. Muscle Loss-Unable to assess  5. Fluid Accumulation-No significant fluid accumulation  6.  Strength-Not measured    Nutrition Diagnosis:   · Problem: Inadequate oral intake  · Etiology: related to Catabolic illness     Signs and symptoms:  as evidenced by Intake 50-75%, Weight loss (+4.7 kg--4.9% since 4/12/17)    Nutrition Assessment:  · Subjective Assessment: Pt sleeping @ time of visit. +Decreased appetite/po intake reported. Noted +wt loss of 4.7 kg (4.9%) sincee 4/12 (95.7 kg). This is not considered significant over time frame of x past 6 months.   · Nutrition-Focused Physical Findings: GI:  +soft, +nontender, +contstipation (stool softener), +last BM (10/6), +passing flatus, +active bowel sounds; PV:  WDL; Skin:  +pale color  · Wound Type: None  · Current Nutrition Therapies:  · Oral Diet Orders: Renal   · Oral Diet intake: 51-75%  · Anthropometric Measures:  · Ht: 5' 10\" (177.8 cm)   · Admission Body Wt: 203 lb 14.8 oz (92.5 kg)  · Usual Body Wt: 210 lb 15.7 oz (95.7 kg) (4/12/17)  · % Weight Change: 4.9%,  x 6 months  · Ideal Body Wt: 163 lb 2.3 oz (74 kg), % Ideal Body 123%  · BMI Classification: BMI 25.0 - 29.9 Overweight  · Comparative Standards (Estimated Nutrition Needs):  · Estimated Daily Total Kcal: 2,810-1,964

## 2017-10-09 NOTE — PROGRESS NOTES
Resp 16   Ht 5' 10\" (1.778 m)   Wt 200 lb 9.6 oz (91 kg)   SpO2 94%   BMI 28.78 kg/m²   General appearance - well appearing, no in pain or distress  Mental status - mildly confused,   Eyes - pupils equal and reactive, extraocular eye movements intact  Ears - bilateral TM's and external ear canals normal  Nose - normal and patent, no erythema, discharge or polyps  Mouth - mucous membranes moist, pharynx normal without lesions. Hoarseness of voice. Neck - supple, no significant adenopathy  Lymphatics - no palpable lymphadenopathy, no hepatosplenomegaly  Chest - clear to auscultation, no wheezes, rales or rhonchi, symmetric air entry  Heart - normal rate, regular rhythm, normal S1, S2, no murmurs, rubs, clicks or gallops  Abdomen - soft, nontender, nondistended, no masses or organomegaly  Neurological - confused. normal speech, no focal findings or movement disorder noted  Musculoskeletal - no joint tenderness, deformity or swelling  Extremities - peripheral pulses normal, no pedal edema, no clubbing or cyanosis  Skin - normal coloration and turgor, no rashes, no suspicious skin lesions noted           Data:    No intake/output data recorded. In: 6438 [I.V.:2875]  Out: 2500 [Urine:2500]    CBC:   Recent Labs      10/07/17   0619  10/08/17   0633  10/09/17   0705   WBC  1.2*  1.2*  1.7*   HGB  8.2*  8.5*  8.9*   PLT  43*  43*  46*     BMP:    Recent Labs      10/08/17   0633  10/08/17   1903  10/09/17   0705   NA  141  139  144   K  5.3  4.5  4.8   CL  106  102  106   CO2  23  23  25   BUN  31*  30*  32*   CREATININE  3.90*  3.87*  3.83*   GLUCOSE  97  111*  103*     Hepatic:   Recent Labs      10/06/17   1102   AST  17   ALT  12   BILITOT  0.32   ALKPHOS  69     INR: No results for input(s): INR in the last 72 hours. PTT:No results for input(s): PTT in the last 72 hours.     Results for orders placed or performed during the hospital encounter of 10/06/17   CBC Auto Differential   Result Value Ref Range    WBC 2.4 (L) 3.5 - 11.0 k/uL    RBC 3.10 (L) 4.5 - 5.9 m/uL    Hemoglobin 9.8 (L) 13.5 - 17.5 g/dL    Hematocrit 29.0 (L) 41 - 53 %    MCV 93.6 80 - 100 fL    MCH 31.7 26 - 34 pg    MCHC 33.9 31 - 37 g/dL    RDW 14.8 (H) 11.5 - 14.5 %    Platelets 58 (L) 444 - 400 k/uL    MPV 6.9 6.0 - 12.0 fL    Differential Type NOT REPORTED     WBC Morphology NOT REPORTED     RBC Morphology NOT REPORTED     Platelet Estimate NOT REPORTED     Seg Neutrophils 62 %    Lymphocytes 20 %    Monocytes 12 %    Eosinophils % 2 %    Basophils 0 %    Metamyelocytes 4 (H) 0 %    Segs Absolute 1.48 (L) 1.8 - 7.7 k/uL    Absolute Lymph # 0.48 (L) 1.0 - 4.8 k/uL    Absolute Mono # 0.29 0.2 - 0.8 k/uL    Absolute Eos # 0.05 0.0 - 0.4 k/uL    Basophils # 0.00 0.0 - 0.2 k/uL    Metamyelocytes Absolute 0.10 (H) 0 k/uL    Morphology INCREASED BANDS PRESENT    Comprehensive Metabolic Panel   Result Value Ref Range    Glucose 140 (H) 70 - 99 mg/dL    BUN 30 (H) 8 - 23 mg/dL    CREATININE 3.41 (H) 0.70 - 1.20 mg/dL    Bun/Cre Ratio 9 9 - 20    Calcium 11.3 (H) 8.6 - 10.4 mg/dL    Sodium 137 135 - 144 mmol/L    Potassium 4.9 3.7 - 5.3 mmol/L    Chloride 96 (L) 98 - 107 mmol/L    CO2 25 20 - 31 mmol/L    Anion Gap 16 9 - 17 mmol/L    Alkaline Phosphatase 69 40 - 129 U/L    ALT 12 5 - 41 U/L    AST 17 <40 U/L    Total Bilirubin 0.32 0.3 - 1.2 mg/dL    Total Protein 6.4 6.4 - 8.3 g/dL    Alb 4.1 3.5 - 5.2 g/dL    Albumin/Globulin Ratio NOT REPORTED 1.0 - 2.5    GFR Non- 18 (L) >60 mL/min    GFR  22 (L) >60 mL/min    GFR Comment          GFR Staging NOT REPORTED    Urinalysis with microscopic   Result Value Ref Range    Color, UA YELLOW YEL    Turbidity UA CLEAR CLEAR    Glucose, Ur NEGATIVE NEG    Bilirubin Urine NEGATIVE NEG    Ketones, Urine NEGATIVE NEG    Specific Gravity, UA 1.020 1.005 - 1.030    Urine Hgb 1+ (A) NEG    pH, UA 5.5 5.0 - 8.0    Protein, UA 1+ (A) NEG    Urobilinogen, Urine Normal NORM    Nitrite, Urine NEGATIVE NEG    Leukocyte Esterase, Urine NEGATIVE NEG    Urinalysis Comments NOT REPORTED     -          WBC, UA 0 TO 2 0 - 5 /HPF    RBC, UA 2 TO 5 0 - 2 /HPF    Casts UA NOT REPORTED /LPF    Crystals UA NOT REPORTED NONE /HPF    Epithelial Cells UA 2 TO 5 /HPF    Renal Epithelial, Urine NOT REPORTED 0 /HPF    Bacteria, UA FEW (A) NONE    Mucus, UA 1+ (A) NONE    Trichomonas, UA NOT REPORTED NONE    Amorphous, UA NOT REPORTED NONE    Other Observations UA NOT REPORTED NREQ    Yeast, UA NOT REPORTED NONE   Creatinine, urine, random   Result Value Ref Range    Creatinine, Ur 51.0 39.0 - 259.0 mg/dL   Sodium, urine, random   Result Value Ref Range    Sodium,Ur 51 mmol/L   Basic metabolic panel   Result Value Ref Range    Glucose 105 (H) 70 - 99 mg/dL    BUN 30 (H) 8 - 23 mg/dL    CREATININE 3.47 (H) 0.70 - 1.20 mg/dL    Bun/Cre Ratio 9 9 - 20    Calcium 10.2 8.6 - 10.4 mg/dL    Sodium 137 135 - 144 mmol/L    Potassium 5.1 3.7 - 5.3 mmol/L    Chloride 103 98 - 107 mmol/L    CO2 25 20 - 31 mmol/L    Anion Gap 9 9 - 17 mmol/L    GFR Non-African American 18 (L) >60 mL/min    GFR  22 (L) >60 mL/min    GFR Comment          GFR Staging NOT REPORTED    CBC   Result Value Ref Range    WBC 1.2 (LL) 3.5 - 11.0 k/uL    RBC 2.64 (L) 4.5 - 5.9 m/uL    Hemoglobin 8.2 (L) 13.5 - 17.5 g/dL    Hematocrit 24.6 (L) 41 - 53 %    MCV 93.1 80 - 100 fL    MCH 31.2 26 - 34 pg    MCHC 33.6 31 - 37 g/dL    RDW 14.0 11.5 - 14.5 %    Platelets 43 (L) 877 - 400 k/uL    MPV NOT REPORTED 6.0 - 12.0 fL   Magnesium   Result Value Ref Range    Magnesium 1.9 1.6 - 2.6 mg/dL   Phosphorus   Result Value Ref Range    Phosphorus 5.5 (H) 2.5 - 4.5 mg/dL   Basic Metabolic Panel   Result Value Ref Range    Glucose 97 70 - 99 mg/dL    BUN 31 (H) 8 - 23 mg/dL    CREATININE 3.90 (H) 0.70 - 1.20 mg/dL    Bun/Cre Ratio 8 (L) 9 - 20    Calcium 11.0 (H) 8.6 - 10.4 mg/dL    Sodium 141 135 - 144 mmol/L    Potassium 5.3 3.7 - 5.3 mmol/L    Chloride 106 98 - 107 mmol/L    CO2 23 20 - 31 mmol/L    Anion Gap 12 9 - 17 mmol/L    GFR Non-African American 16 (L) >60 mL/min    GFR  19 (L) >60 mL/min    GFR Comment          GFR Staging NOT REPORTED    CBC Auto Differential   Result Value Ref Range    WBC 1.2 (LL) 3.5 - 11.0 k/uL    RBC 2.64 (L) 4.5 - 5.9 m/uL    Hemoglobin 8.5 (L) 13.5 - 17.5 g/dL    Hematocrit 24.9 (L) 41 - 53 %    MCV 94.5 80 - 100 fL    MCH 32.3 26 - 34 pg    MCHC 34.1 31 - 37 g/dL    RDW 15.3 (H) 11.5 - 14.5 %    Platelets 43 (L) 921 - 400 k/uL    MPV 7.6 6.0 - 12.0 fL    Differential Type NOT REPORTED     WBC Morphology NOT REPORTED     RBC Morphology NOT REPORTED     Platelet Estimate NOT REPORTED     Seg Neutrophils 51 %    Lymphocytes 36 %    Monocytes 9 %    Eosinophils % 1 %    Basophils 1 %    Metamyelocytes 1 (H) 0 %    Myelocytes 1 (H) 0 %    Segs Absolute 0.62 (L) 1.8 - 7.7 k/uL    Absolute Lymph # 0.43 (L) 1.0 - 4.8 k/uL    Absolute Mono # 0.11 (L) 0.2 - 0.8 k/uL    Absolute Eos # 0.01 0.0 - 0.4 k/uL    Basophils # 0.01 0.0 - 0.2 k/uL    Metamyelocytes Absolute 0.01 (H) 0 k/uL    Myelocytes Absolute 0.01 (H) 0 k/uL    Morphology INCREASED BANDS PRESENT    Basic Metabolic Panel   Result Value Ref Range    Glucose 98 70 - 99 mg/dL    BUN 30 (H) 8 - 23 mg/dL    CREATININE 3.63 (H) 0.70 - 1.20 mg/dL    Bun/Cre Ratio 8 (L) 9 - 20    Calcium 10.7 (H) 8.6 - 10.4 mg/dL    Sodium 138 135 - 144 mmol/L    Potassium 4.9 3.7 - 5.3 mmol/L    Chloride 104 98 - 107 mmol/L    CO2 20 20 - 31 mmol/L    Anion Gap 14 9 - 17 mmol/L    GFR Non-African American 17 (L) >60 mL/min    GFR  21 (L) >60 mL/min    GFR Comment          GFR Staging NOT REPORTED    Microalbumin, Ur   Result Value Ref Range    Microalb, Ur 49 (H) <21 mg/L    Creatinine, Ur 52.9 39.0 - 259.0 mg/dL    Microalb/Crt.  Ratio 93 (H) <17 mcg/mg creat   Creatine Kinase   Result Value Ref Range    Total  39 - 308 U/L   Basic Metabolic Panel   Result Value Ref Range    Glucose 111 (H) 70 - 99 mg/dL    BUN 30 (H) 8 - 23 mg/dL    CREATININE 3.87 (H) 0.70 - 1.20 mg/dL    Bun/Cre Ratio 8 (L) 9 - 20    Calcium 11.2 (H) 8.6 - 10.4 mg/dL    Sodium 139 135 - 144 mmol/L    Potassium 4.5 3.7 - 5.3 mmol/L    Chloride 102 98 - 107 mmol/L    CO2 23 20 - 31 mmol/L    Anion Gap 14 9 - 17 mmol/L    GFR Non-African American 16 (L) >60 mL/min    GFR  19 (L) >60 mL/min    GFR Comment          GFR Staging NOT REPORTED    Uric Acid   Result Value Ref Range    Uric Acid 8.1 (H) 3.4 - 7.0 mg/dL   Magnesium   Result Value Ref Range    Magnesium 1.8 1.6 - 2.6 mg/dL   Phosphorus   Result Value Ref Range    Phosphorus 4.8 (H) 2.5 - 4.5 mg/dL   Basic Metabolic Panel   Result Value Ref Range    Glucose 103 (H) 70 - 99 mg/dL    BUN 32 (H) 8 - 23 mg/dL    CREATININE 3.83 (H) 0.70 - 1.20 mg/dL    Bun/Cre Ratio 8 (L) 9 - 20    Calcium 11.4 (H) 8.6 - 10.4 mg/dL    Sodium 144 135 - 144 mmol/L    Potassium 4.8 3.7 - 5.3 mmol/L    Chloride 106 98 - 107 mmol/L    CO2 25 20 - 31 mmol/L    Anion Gap 13 9 - 17 mmol/L    GFR Non-African American 16 (L) >60 mL/min    GFR  19 (L) >60 mL/min    GFR Comment          GFR Staging NOT REPORTED    CBC Auto Differential   Result Value Ref Range    WBC 1.7 (L) 3.5 - 11.0 k/uL    RBC 2.82 (L) 4.5 - 5.9 m/uL    Hemoglobin 8.9 (L) 13.5 - 17.5 g/dL    Hematocrit 26.5 (L) 41 - 53 %    MCV 94.0 80 - 100 fL    MCH 31.6 26 - 34 pg    MCHC 33.6 31 - 37 g/dL    RDW 14.2 11.5 - 14.5 %    Platelets 46 (L) 519 - 400 k/uL    MPV NOT REPORTED 6.0 - 12.0 fL    Differential Type NOT REPORTED     Seg Neutrophils Pending %    Lymphocytes Pending %    Monocytes Pending %    Eosinophils % Pending %    Basophils Pending %    Segs Absolute Pending k/uL    Absolute Lymph # Pending k/uL    Absolute Mono # Pending k/uL    Absolute Eos # Pending k/uL    Basophils # Pending 0.0 - 0.2 k/uL    WBC Morphology NOT REPORTED     RBC Morphology NOT REPORTED     Platelet left radius near the left elbow less conspicuous today. Assessment      Chief Complaint   Patient presents with    Other     voice hoarse slow to respond     Patient Active Problem List   Diagnosis    Psychiatric diagnosis    Thoracic or lumbosacral neuritis or radiculitis, unspecified    Cancer ([address]    Hyponatremia    FUO (fever of unknown origin)    Bronchitis    Acute encephalopathy    Thrombocytopenia ([address]    Acute kidney injury ([address]    Pancytopenia ([address]    Multiple myeloma ([address]         Plan     Patient has nonsecretory multiple myeloma. We will order serum free light chain and urine immunofixation to confirm true nonsecretory myeloma. If that is the case plasma cell dyscrasia is unlikely the etiology of renal dysfunction. Concerned if pancytopenia is secondary to progression of disease. Pamolidamide has been on hold however counts continue to be low. Will discuss with patient's primary oncologist regarding possibility of doing a bone marrow biopsy later on.

## 2017-10-09 NOTE — PROGRESS NOTES
[facility]    Progress Note    10/9/2017    7:17 AM    Name:   Giovany Almonte  MRN:     [id number]     Acct:      [de-identified]   Room:   [address] [place] [address] Day:  3  Admit Date:  10/6/2017 10:28 AM    PCP:   Alvin Sandhu MD  Code Status:  Full Code    Subjective:     C/C:   Chief Complaint   Patient presents with    Other     voice hoarse slow to respond     Interval History Status: not changed. Patient has no acute complaints, confused and restless. Brief History: This is a 64-year-old white male was admitted with intermittent confusion and speech problems.  He is found have acute renal failure in the emergency room and has been admitted to the hospital.  He's been treated with IV fluids and nephrology consultation.     He has a history of multiple myeloma and has been treated with chemotherapy, oncology evaluation to assist in management       Review of Systems:     Constitutional:  negative for chills, fevers, sweats  Respiratory:  negative for cough, dyspnea on exertion, shortness of breath, wheezing  Cardiovascular:  negative for chest pain, chest pressure/discomfort, lower extremity edema, palpitations  Gastrointestinal:  negative for abdominal pain, constipation, diarrhea, nausea, vomiting  Neurological:  negative for dizziness, headache    Medications:      Allergies:  No Known Allergies    Current Meds:   Scheduled Meds:    polyethylene glycol  17 g Oral Daily    aspirin  81 mg Oral Daily    oxyCODONE  40 mg Oral Q12H    sodium chloride flush  10 mL Intravenous 2 times per day    famotidine  20 mg Oral Daily    acyclovir  400 mg Oral Daily     Continuous Infusions:    sodium chloride 150 mL/hr at 10/09/17 0254     PRN Meds: LORazepam, albuterol sulfate HFA, oxyCODONE, sodium chloride flush, acetaminophen, ondansetron, diphenhydrAMINE    Data:     Past Medical History:   has a past medical history of Multiple myeloma ([facility] and Component Value Date/Time    CULTURE NO GROWTH 02/28/2016 11:34 PM    CULTURE  02/28/2016 11:34 PM     Performed at [facility], [address] [place] [address] [phone]       No results found for: POCPH, PHART, PH, POCPCO2, CEG2WSS, PCO2, POCPO2, PO2ART, PO2, POCHCO3, REJ1DMJ, HCO3, NBEA, PBEA, BEART, BE, THGBART, THB, CTP3CCM, NLWW8CQT, S2HLJIUO, O2SAT, FIO2    Radiology:    No new radiology reports    Physical Examination:        General appearance:  alert, cooperative and no distress  Mental Status:  oriented to person, place and time and normal affect  Lungs:  clear to auscultation bilaterally, normal effort  Heart:  regular rate and rhythm, no murmur  Abdomen:  soft, nontender, nondistended, normal bowel sounds, no masses, hepatomegaly, splenomegaly  Extremities:  no edema, redness, tenderness in the calves  Skin:  no gross lesions, rashes, induration    Assessment:        Primary Problem  Acute kidney injury [facility])    Active Hospital Problems    Diagnosis Date Noted    Acute encephalopathy [G93.40] 02/29/2016     Priority: High    Acute kidney injury ([facility] [N17.9] 10/06/2017    Pancytopenia ([address] [D61.818] 10/06/2017    Multiple myeloma ([facility] [C90.00] 10/06/2017       Plan:        1. Continue present medications  2. Fluids per nephrology  3. GI and DVT prophylaxis  4. Follow labs  5. Today's results and care plan discussed with his wife at the bedside. Multiple questions answered and approximately 20 minutes spent with patient and family.     Fazal Jackman DO  10/9/2017  7:17 AM

## 2017-10-09 NOTE — DISCHARGE SUMMARY
10/18/2017    MCH 31.7 10/18/2017    MCHC 34.5 10/18/2017    RDW 14.9 10/18/2017    PLT 43 10/18/2017     03/17/2012     BMP:    Lab Results   Component Value Date    GLUCOSE 105 10/18/2017     10/18/2017    K 3.5 10/18/2017     10/18/2017    CO2 22 10/18/2017    ANIONGAP 14 10/18/2017    BUN 27 10/18/2017    CREATININE 2.45 10/18/2017    BUNCRER 11 10/18/2017    CALCIUM 9.6 10/18/2017    LABGLOM 27 10/18/2017    GFRAA 32 10/18/2017    GFR      10/18/2017    GFR NOT REPORTED 10/18/2017       Radiology:  EXAMINATION:  MRI OF THE BRAIN WITHOUT CONTRAST  10/11/2017 2:01 pm    TECHNIQUE:  Multiplanar multisequence MRI of the brain was performed without the  administration of intravenous contrast.    COMPARISON:  None. HISTORY:  ORDERING SYSTEM PROVIDED HISTORY: altered mentation    Initial evaluation. FINDINGS:  INTRACRANIAL STRUCTURES/VENTRICLES: There is no acute infarct. No mass effect  or midline shift. No abnormal extra-axial fluid collection. [place] is  prominence of the ventricles and sulci due to global parenchymal volume loss. The sellar/suprasellar regions appear unremarkable.  The normal signal voids  within the major intracranial vessels appear maintained. ORBITS: The visualized portion of the orbits demonstrate no acute abnormality. SINUSES: Opacification is seen involving the right frontal sinuses.  The  mastoid air cells are clear. BONES/SOFT TISSUES: There is diffusely decreased T1 marrow signal.  There  appears to be a T2 hyperintense lesions seen involving is the right sphenoid  bone The craniocervical junction appears unremarkable.      Impression:       1. No acute intracranial abnormality.  No acute infarct. 2. Minimal global parenchymal volume loss. 3. Diffusely decreased T1 marrow signal, which is nonspecific, but can be  seen with chronic anemia as well as a diffusely infiltrative marrow process.   4. A T2 hyperintense lesion is seen within the right sphenoid bone, which  could be related to patient's history of multiple myeloma. Xr Chest Standard (2 Vw)    Result Date: 10/6/2017  EXAMINATION: TWO VIEWS OF THE CHEST 10/6/2017 11:26 am COMPARISON: Chest x-ray from 02/28/2016 HISTORY: ORDERING SYSTEM PROVIDED HISTORY: [facility] TECHNOLOGIST PROVIDED HISTORY: Reason for exam:->AMS Ordering Physician Provided Reason for Exam: Altered mental status x 3 days. hx of mult myeloma Acuity: Acute Type of Exam: Initial FINDINGS: Left chest port catheter extends to the level of the superior atrial caval junction. There is stable relative elevation of the left hemidiaphragm. There is no focal airspace consolidation, pleural effusion or pneumothorax. The cardiomediastinal silhouette appears within normal limits and there is no pulmonary vascular congestion. Stable chronic fracture deformity of the proximal right clavicle. There are similar moderate hypertrophic degenerative changes of the visualized spine in shoulders. Visualized osseous structures appear moderately demineralized, but grossly intact, given the non dedicated imaging. Stable vertebroplasty changes identified. Stable nonspecific lucent lesion in the mid to distal diaphysis of the left clavicle. No focal airspace consolidation or pulmonary vascular congestion. Ct Head Wo Contrast    Result Date: 10/6/2017  EXAMINATION: CT OF THE HEAD WITHOUT CONTRAST  10/6/2017 11:14 am TECHNIQUE: CT of the head was performed without the administration of intravenous contrast. Dose modulation, iterative reconstruction, and/or weight based adjustment of the mA/kV was utilized to reduce the radiation dose to as low as reasonably achievable. COMPARISON: CT head February 28, 2016 HISTORY: ORDERING SYSTEM PROVIDED HISTORY: Altered mental status, history of multiple myeloma FINDINGS: Limited study due to artifacts.  BRAIN/VENTRICLES: There is mild periventricular white matter low attenuation, likely related to mild chronic microvascular disease. There is no acute intracranial hemorrhage, mass effect or midline shift. No abnormal extra-axial fluid collection. The gray-white differentiation is maintained without evidence of an acute infarct. There is no evidence of hydrocephalus. ORBITS: The visualized portion of the orbits demonstrate no acute abnormality. SINUSES: There is scattered mild mucosal thickening in the paranasal sinuses. There is a retention cyst versus polyp in the right frontal sinus. The bilateral mastoid air cells are clear. SOFT TISSUES/SKULL:  There is a 1.5 cm lytic lesion in the right forehead calvarium, stable in size with mildly increased peripheral sclerosis, nonspecific, may be related to chronic sinusitis versus lesions related to history of multiple myeloma. No acute intracranial abnormality. Mild chronic microvascular disease. 1.5 cm lytic lesion in the right forehead calvarium, stable in size with mildly increased peripheral sclerosis, nonspecific, may be related to chronic sinusitis versus lesion related to history of multiple myeloma. Us Renal Complete    Result Date: 10/6/2017  EXAMINATION: RETROPERITONEAL ULTRASOUND OF THE KIDNEYS AND URINARY BLADDER 10/6/2017 COMPARISON: None HISTORY: ORDERING SYSTEM PROVIDED HISTORY: acute renal failure 68-year-old male with acute renal failure and painful urination for 1 month FINDINGS: Kidneys: Right kidney measures 12.9 by 5.1 x 5.3 cm in greatest longitudinal, AP, and transverse dimensions for the total right renal volume of 180.6 mL. Right renal cortical thickness measures up to 1.6 cm. Left kidney measures 11.1 by 6.6 x 4.9 cm in greatest longitudinal, AP, and transverse dimensions for the total left renal volume of 188.1 mL. Left renal cortical thickness measures up to 2.1 cm. Bilateral corticomedullary differentiation is well-maintained. Color flow projects over the bilateral renal parenchyma. No hydronephrosis. No perinephric fluid.  Bladder:

## 2017-10-09 NOTE — PLAN OF CARE
Problem: Nutrition  Goal: Optimal nutrition therapy  Nutrition Problem: Inadequate oral intake  Intervention: Food and/or Nutrient Delivery: Continue current diet, Start ONS  Nutritional Goals: PO intake to meet >75% of estimated kcal/protein needs, with good GI tolerance / management of renal labs  Outcome: Ongoing

## 2017-10-10 PROBLEM — E83.52 HYPERCALCEMIA: Status: ACTIVE | Noted: 2017-01-01

## 2017-10-10 NOTE — CARE COORDINATION
Case Management Initial Discharge Plan  Milagro Mcleod,         Readmission Risk              Readmission Risk:        7.5       Age 70 or Greater:  0    Admitted from SNF or Requires Paid or Family Care:  0    Currently has CHF,COPD,ARF,CRI,or is on dialysis:  0    Takes more than 5 Prescription Medications:  0    Takes Digoxin,Insulin,Anticoagulants,Narcotics or ASA/Plavix:  [address] in Past 12 Months:  0    On Disability:  3    Patient Considers own Health:  2.5            Met with:patient to discuss discharge plans. Information verified: address, contacts, phone number, , insurance Yes  PCP: Andrés Schulte MD  Date of last visit: 6 weeks ago    Insurance Provider:  Xavier Soriano    Discharge Planning  Current Residence:   private  Living Arrangements:  Spouse/Significant Other   Home has 1  stories/2 stairs to climb  Support Systems:  Children, Family Members, Spouse/Significant Other  Current Services PTA:    Supplier:    Patient able to perform ADL's:Independent  DME used to aid ambulation prior to admission: none /during admission none   Potential Assistance Needed:  N/A    Pharmacy:  None, states \"dont take any medications\"  Potential Assistance Purchasing Medications:  No  Does patient want to participate in local refill/ meds to beds program?  N/A    Patient agreeable to home care: Yes, only if needed  Freedom of choice provided:  n/a      Type of Home Care Services:  None  Patient expects to be discharged to:  Home    Prior SNF/Rehab Placement and Facility: none   Agreeable to SNF/Rehab: No  [place] of choice provided: n/a   Evaluation: no    Expected Discharge date:  10/08/17  Follow Up Appointment: Best Day/ Time:  (Mon, Wed, Fri are best (8 am - 2 pm) )    Transportation provider: spouse   Transportation arrangements needed for discharge: No     Discharge Plan: home with spouse. Open to home care if needed.          Electronically signed by Jasmina Calderón RN on 10/10/17 at 4:09 PM

## 2017-10-10 NOTE — PROGRESS NOTES
Physical Therapy    Facility/Department: [facility] PROGRESSIVE CARE  Initial Assessment    NAME: Gunjan Merritt  : 1955  MRN: [id number]    Date of Service: 10/10/2017    Patient Diagnosis(es): The encounter diagnosis was Acute kidney injury [facility]). has a past medical history of Multiple myeloma ([facility] [address] and Thoracic or lumbosacral neuritis or radiculitis, unspecified. has a past surgical history that includes Kyphosis surgery; bone marrow transplant (2013); Knee arthroscopy (Bilateral); Shoulder arthroscopy (Bilateral); and Tunneled venous port placement. Restrictions  Restrictions/Precautions  Restrictions/Precautions: General Precautions, Fall Risk  Required Braces or Orthoses?: No  Position Activity Restriction  Other position/activity restrictions: mild confusion, intention tremor noted B UE/LE's  Vision/Hearing  Vision: Impaired  Vision Exceptions: Wears glasses at all times  Hearing: Within functional limits     Subjective  General  Chart Reviewed: Yes  Patient assessed for rehabilitation services?: Yes  Family / Caregiver Present: No  Follows Commands: Within Functional Limits  Pain Screening  Patient Currently in Pain: No  Vital Signs  Patient Currently in Pain: No       Orientation  Orientation  Overall Orientation Status: Impaired  Orientation Level: Oriented to situation;Oriented to person;Disoriented to place; Disoriented to time    Social/Functional History  Social/Functional History  Lives With: Spouse  Type of Home: House  Home Layout: One level  Home Access: Stairs to enter without rails  Entrance Stairs - Number of Steps: 3  Bathroom Shower/Tub: Walk-in shower  ADL Assistance: Independent  Homemaking Assistance: Needs assistance  Homemaking Responsibilities: Yes (shares chores with spouse)  Ambulation Assistance: Independent  Transfer Assistance: Independent  Active : Yes  Mode of Transportation: Car  Additional Comments: Pt with occasional confusion during questions, able to recall well  Objective          AROM RLE (degrees)  RLE AROM: WFL  AROM LLE (degrees)  LLE AROM : WFL  AROM RUE (degrees)  RUE AROM : WFL  AROM LUE (degrees)  LUE AROM : WFL  Strength RLE  Strength RLE: WFL  Strength LLE  Strength LLE: WFL  Strength RUE  Strength RUE: WFL  Strength LUE  Strength LUE: WFL     Sensation  Overall Sensation Status: WFL  Bed mobility  Rolling to Right: Stand by assistance  Supine to Sit: Stand by assistance  Scooting: Stand by assistance  Transfers  Sit to Stand: Contact guard assistance (mildly unsteady with initial standing)  Stand to sit: Contact guard assistance  Ambulation  Ambulation?: Yes  More Ambulation?: No  Ambulation 1  Surface: level tile  Device: Rolling Walker  Assistance: Contact guard assistance;Minimal assistance  Quality of Gait: pt with slow steady amb with rw, [place] for turn due to decreased balance and safety awareness  Distance: 120 ft  Stairs/Curb  Stairs?: No     Balance  Posture: Good  Sitting - Static: Good  Sitting - Dynamic: Good  Standing - Static: Fair;+  Standing - Dynamic: Fair  Comments: standing balance with rw    Pt educated on safety and orientation. Positioned with legs up for comfort and call light in reach    Assessment   Body structures, Functions, Activity limitations: Decreased functional mobility ; Decreased strength;Decreased balance;Decreased endurance  Assessment: Pt able to amb 120 ft with rw in portillo, [place] for turning safety. Tremor noted at times with eating and MMT.   Pt would benefit from additional PT as outpatient  Prognosis: Good  Decision Making: Low Complexity  REQUIRES PT FOLLOW UP: Yes  Activity Tolerance  Activity Tolerance: Patient Tolerated treatment well  PT Equipment Recommendations  Equipment Needed: Yes  Mobility Devices: Jaime [place]: Rolling     Discharge Recommendations:  Continue to assess pending progress, Home with assist PRN, Outpatient PT      Plan   Plan  Times per week: 1-2x/day  Current Treatment Recommendations:

## 2017-10-10 NOTE — PROGRESS NOTES
Patient set off bed alarm, found sitting at side of bed with drainage bag disconnected from aragon catheter and trying to pull out catheter. New bag placed and patient assisted to chair while bed changed. Ativan given for anxiety.

## 2017-10-10 NOTE — PROGRESS NOTES
distress  SKIN: warm and dry, no rash or erythema  EYES: conjunctivae normal and sclera anicteric  ENT: no thrush no pharyngeal congestion   NECK:   No JVD. No carotid bruits and no carotid lymphadenopathy . PULMONARY: lungs are clear to auscultation. No Wheezing, no ronchi . CADRDIOVASCULAR: S1 and S2 normal NO S3 and NO S4 . No rubs , no murmur. ABDOMEN: soft nontender, bowel sounds present, no organomegaly, no ascites.        EXTREMITIES: no cyanosis, clubbing or edema     CURRENT MEDICATIONS        calcitonin (MIACALCIN) injection 400 Units BID   LORazepam (ATIVAN) tablet 0.5 mg Q6H PRN   polyethylene glycol (GLYCOLAX) packet 17 g Daily   0.9 % sodium chloride infusion Continuous   aspirin chewable tablet 81 mg Daily   albuterol sulfate  (90 Base) MCG/ACT inhaler 2 puff Q6H PRN   oxyCODONE (OXYCONTIN) extended release tablet 40 mg Q12H   oxyCODONE (ROXICODONE) immediate release tablet 5 mg Q4H PRN   sodium chloride flush 0.9 % injection 10 mL 2 times per day   sodium chloride flush 0.9 % injection 10 mL PRN   acetaminophen (TYLENOL) tablet 650 mg Q4H PRN   ondansetron (ZOFRAN) injection 4 mg Q6H PRN   famotidine (PEPCID) tablet 20 mg Daily   acyclovir (ZOVIRAX) capsule 400 mg Daily   diphenhydrAMINE (BENADRYL) tablet 25 mg Nightly PRN         LABS      CBC:   Recent Labs      10/08/17   0633  10/09/17   0705  10/10/17   0645   WBC  1.2*  1.7*  1.9*   RBC  2.64*  2.82*  2.92*   HGB  8.5*  8.9*  9.1*   HCT  24.9*  26.5*  26.8*   MCV  94.5  94.0  91.8   MCH  32.3  31.6  31.0   MCHC  34.1  33.6  33.8   RDW  15.3*  14.2  13.7   PLT  43*  46*  46*   MPV  7.6  NOT REPORTED  NOT REPORTED      BMP:   Recent Labs      10/08/17   1903  10/09/17   0705  10/10/17   0645   NA  139  144  145*   K  4.5  4.8  4.6   CL  102  106  108*   CO2  23  25  22   BUN  30*  32*  32*   CREATININE  3.87*  3.83*  3.42*   GLUCOSE  111*  103*  126*   CALCIUM  11.2*  11.4*  10.9*      PHOSPHORUS:    Recent Labs      10/08/17   8808

## 2017-10-10 NOTE — PROGRESS NOTES
[facility]    Second Visit Note  For more detailed information please refer to the progress note of the day      10/10/2017    6:13 PM    Name:   Gunjan Merritt  MRN:     [id number]     Acct:      [de-identified]   Room:   1807/0216-97   Day:  4  Admit Date:  10/6/2017 10:28 AM    PCP:   Elizabeth Blake MD  Code Status:  Full Code        Pt vitals were reviewed   New labs were reviewed   Patient was seen    Updated plan :     1. Monitor renal fcn  2.  May need renal biopsy        Ted Mayes Blood, DO  10/10/2017  6:13 PM

## 2017-10-10 NOTE — PLAN OF CARE
Problem: Safety:  Goal: Free from accidental physical injury  Free from accidental physical injury   Outcome: Ongoing  Patient so far remains free from accidental physical injury this shift, remains confused to situation and place, bed alarm is armed which was set off several times this shift, had discussion with patient several times this shift regarding needs and expectations, room to door remains open, call light, overhead table and other frequently used items within reach, 2/4 side rails up, non-slip socks in place, path clear to restroom and fall sign posted. Will continue to monitor for further safety needs and apply interventions as needed.

## 2017-10-10 NOTE — PROGRESS NOTES
I.V.:2964]  Out: 5075 [Urine:5075]    CBC:   Recent Labs      10/08/17   0633  10/09/17   0705  10/10/17   0645   WBC  1.2*  1.7*  1.9*   HGB  8.5*  8.9*  9.1*   PLT  43*  46*  46*     BMP:    Recent Labs      10/08/17   1903  10/09/17   0705  10/10/17   0645   NA  139  144  145*   K  4.5  4.8  4.6   CL  102  106  108*   CO2  23  25  22   BUN  30*  32*  32*   CREATININE  3.87*  3.83*  3.42*   GLUCOSE  111*  103*  126*     Hepatic:   No results for input(s): AST, ALT, ALB, BILITOT, ALKPHOS in the last 72 hours. INR: No results for input(s): INR in the last 72 hours. PTT:No results for input(s): PTT in the last 72 hours.     Results for orders placed or performed during the hospital encounter of 10/06/17   CBC Auto Differential   Result Value Ref Range    WBC 2.4 (L) 3.5 - 11.0 k/uL    RBC 3.10 (L) 4.5 - 5.9 m/uL    Hemoglobin 9.8 (L) 13.5 - 17.5 g/dL    Hematocrit 29.0 (L) 41 - 53 %    MCV 93.6 80 - 100 fL    MCH 31.7 26 - 34 pg    MCHC 33.9 31 - 37 g/dL    RDW 14.8 (H) 11.5 - 14.5 %    Platelets 58 (L) 356 - 400 k/uL    MPV 6.9 6.0 - 12.0 fL    Differential Type NOT REPORTED     WBC Morphology NOT REPORTED     RBC Morphology NOT REPORTED     Platelet Estimate NOT REPORTED     Seg Neutrophils 62 %    Lymphocytes 20 %    Monocytes 12 %    Eosinophils % 2 %    Basophils 0 %    Metamyelocytes 4 (H) 0 %    Segs Absolute 1.48 (L) 1.8 - 7.7 k/uL    Absolute Lymph # 0.48 (L) 1.0 - 4.8 k/uL    Absolute Mono # 0.29 0.2 - 0.8 k/uL    Absolute Eos # 0.05 0.0 - 0.4 k/uL    Basophils # 0.00 0.0 - 0.2 k/uL    Metamyelocytes Absolute 0.10 (H) 0 k/uL    Morphology INCREASED BANDS PRESENT    Comprehensive Metabolic Panel   Result Value Ref Range    Glucose 140 (H) 70 - 99 mg/dL    BUN 30 (H) 8 - 23 mg/dL    CREATININE 3.41 (H) 0.70 - 1.20 mg/dL    Bun/Cre Ratio 9 9 - 20    Calcium 11.3 (H) 8.6 - 10.4 mg/dL    Sodium 137 135 - 144 mmol/L    Potassium 4.9 3.7 - 5.3 mmol/L    Chloride 96 (L) 98 - 107 mmol/L    CO2 25 20 - 31 mmol/L calvarium, stable in size with mildly increased peripheral sclerosis, nonspecific, may be related to chronic sinusitis versus lesions related to history of multiple myeloma. No acute intracranial abnormality. Mild chronic microvascular disease. 1.5 cm lytic lesion in the right forehead calvarium, stable in size with mildly increased peripheral sclerosis, nonspecific, may be related to chronic sinusitis versus lesion related to history of multiple myeloma. Us Renal Complete    Result Date: 10/6/2017  EXAMINATION: RETROPERITONEAL ULTRASOUND OF THE KIDNEYS AND URINARY BLADDER 10/6/2017 COMPARISON: None HISTORY: ORDERING SYSTEM PROVIDED HISTORY: acute renal failure 58-year-old male with acute renal failure and painful urination for 1 month FINDINGS: Kidneys: Right kidney measures 12.9 by 5.1 x 5.3 cm in greatest longitudinal, AP, and transverse dimensions for the total right renal volume of 180.6 mL. Right renal cortical thickness measures up to 1.6 cm. Left kidney measures 11.1 by 6.6 x 4.9 cm in greatest longitudinal, AP, and transverse dimensions for the total left renal volume of 188.1 mL. Left renal cortical thickness measures up to 2.1 cm. Bilateral corticomedullary differentiation is well-maintained. Color flow projects over the bilateral renal parenchyma. No hydronephrosis. No perinephric fluid. Bladder: Prevoid urinary bladder measures 10.5 x 8.5 x 8.9 cm in greatest longitudinal, AP, and transverse dimensions for the total urinary bladder volume of 521.8 mL. Postvoid urinary bladder measures 3.7 x 6.4 x 6.4 cm for a total postvoid urinary bladder volume of 79.5 mL.     1. Unremarkable renal ultrasound without hydronephrosis. 2. Moderate postvoid residual of the urinary bladder.          Problem Lists:   Primary Problem:  Acute kidney injury [facility])   Current Problems:  Active Hospital Problems    Diagnosis Date Noted    Acute encephalopathy [G93.40] 10/09/2017     Priority: High    Hypercalcemia congestion. Stable chronic fracture deformity of the proximal right clavicle. There are similar moderate hypertrophic degenerative changes of the visualized spine in shoulders. Visualized osseous structures appear moderately demineralized, but grossly intact, given the non dedicated imaging. Stable vertebroplasty changes identified. Stable nonspecific lucent lesion in the mid to distal diaphysis of the left clavicle. No focal airspace consolidation or pulmonary vascular congestion. Ct Head Wo Contrast    Result Date: 10/6/2017  EXAMINATION: CT OF THE HEAD WITHOUT CONTRAST  10/6/2017 11:14 am TECHNIQUE: CT of the head was performed without the administration of intravenous contrast. Dose modulation, iterative reconstruction, and/or weight based adjustment of the mA/kV was utilized to reduce the radiation dose to as low as reasonably achievable. COMPARISON: CT head February 28, 2016 HISTORY: ORDERING SYSTEM PROVIDED HISTORY: Altered mental status, history of multiple myeloma FINDINGS: Limited study due to artifacts. BRAIN/VENTRICLES: There is mild periventricular white matter low attenuation, likely related to mild chronic microvascular disease. There is no acute intracranial hemorrhage, mass effect or midline shift. No abnormal extra-axial fluid collection. The gray-white differentiation is maintained without evidence of an acute infarct. There is no evidence of hydrocephalus. ORBITS: The visualized portion of the orbits demonstrate no acute abnormality. SINUSES: There is scattered mild mucosal thickening in the paranasal sinuses. There is a retention cyst versus polyp in the right frontal sinus. The bilateral mastoid air cells are clear. SOFT TISSUES/SKULL:  There is a 1.5 cm lytic lesion in the right forehead calvarium, stable in size with mildly increased peripheral sclerosis, nonspecific, may be related to chronic sinusitis versus lesions related to history of multiple myeloma.      No acute intracranial abnormality. Mild chronic microvascular disease. 1.5 cm lytic lesion in the right forehead calvarium, stable in size with mildly increased peripheral sclerosis, nonspecific, may be related to chronic sinusitis versus lesion related to history of multiple myeloma. Us Renal Complete    Result Date: 10/6/2017  EXAMINATION: RETROPERITONEAL ULTRASOUND OF THE KIDNEYS AND URINARY BLADDER 10/6/2017 COMPARISON: None HISTORY: ORDERING SYSTEM PROVIDED HISTORY: acute renal failure 57-year-old male with acute renal failure and painful urination for 1 month FINDINGS: Kidneys: Right kidney measures 12.9 by 5.1 x 5.3 cm in greatest longitudinal, AP, and transverse dimensions for the total right renal volume of 180.6 mL. Right renal cortical thickness measures up to 1.6 cm. Left kidney measures 11.1 by 6.6 x 4.9 cm in greatest longitudinal, AP, and transverse dimensions for the total left renal volume of 188.1 mL. Left renal cortical thickness measures up to 2.1 cm. Bilateral corticomedullary differentiation is well-maintained. Color flow projects over the bilateral renal parenchyma. No hydronephrosis. No perinephric fluid. Bladder: Prevoid urinary bladder measures 10.5 x 8.5 x 8.9 cm in greatest longitudinal, AP, and transverse dimensions for the total urinary bladder volume of 521.8 mL. Postvoid urinary bladder measures 3.7 x 6.4 x 6.4 cm for a total postvoid urinary bladder volume of 79.5 mL.     1. Unremarkable renal ultrasound without hydronephrosis. 2. Moderate postvoid residual of the urinary bladder.            Assessment      Chief Complaint   Patient presents with    Other     voice hoarse slow to respond     Patient Active Problem List   Diagnosis    Psychiatric diagnosis    Thoracic or lumbosacral neuritis or radiculitis, unspecified    Cancer ([address]    Hyponatremia    FUO (fever of unknown origin)    Bronchitis    Acute encephalopathy    Thrombocytopenia ([address]    Acute kidney injury ([address]  Pancytop[facility] ([facility] [address]    Multiple myeloma ([facility] [address]    Hypercalcemia         Plan   Discussed patient's case with his primary oncologist Dr. Asiya Zurita. Also reviewed recommendations from Dr. Santa Huntley from [place]. Patient's free light chain ratio is significantly elevated suggestive off disease progression. Patient's pancytopenia and renal function is secondary to progressive disease. Hypercalcemia also contributing to renal dysfunction. Discussed with Dr. Ahumada Finely will hold off on kidney biopsy. Unlikely amyloidosis as patient does not have significant proteinuria. Plan to work with nephrology to improve renal function. Once patient's renal function has improved will consider starting patient on carfilzomib and panobinostat as outpatient. Patient started on calcitonin by nephrology team for hypercalcemia. I spent more 35 than minutes examining, evaluating, reviewing data and counseling the patient.

## 2017-10-10 NOTE — PROGRESS NOTES
[facility]    Progress Note    10/10/2017    9:27 AM    Name:   Dean Lira  MRN:     [id number]     Acct:      [de-identified]   Room:   [address] [place] [address] Day:  4  Admit Date:  10/6/2017 10:28 AM    PCP:   Lyndon Bernabe MD  Code Status:  Full Code    Subjective:     C/C:   Chief Complaint   Patient presents with    Other     voice hoarse slow to respond     Interval History Status: improved. States he feels better and more clearheaded today does not feel fuzzy  Denies chest pain or shortness of breath    Brief History: This is a 77-year-old white male was admitted with intermittent confusion and speech problems.  He is found have acute renal failure in the emergency room and has been admitted to the hospital.  He's been treated with IV fluids and nephrology consultation.     He has a history of multiple myeloma and has been treated with chemotherapy, oncology evaluation Requested    In hospital for 4 days    Review of Systems:     Constitutional:  negative for chills, fevers, sweats  Respiratory:  negative for cough, dyspnea on exertion, shortness of breath, wheezing  Cardiovascular:  negative for chest pain, chest pressure/discomfort, lower extremity edema, palpitations  Gastrointestinal:  negative for abdominal pain, constipation, diarrhea, nausea, vomiting  Neurological:  negative for dizziness, headache    Medications:      Allergies:  No Known Allergies    Current Meds:   Scheduled Meds:    calcitonin  200 Units Subcutaneous BID    polyethylene glycol  17 g Oral Daily    aspirin  81 mg Oral Daily    oxyCODONE  40 mg Oral Q12H    sodium chloride flush  10 mL Intravenous 2 times per day    famotidine  20 mg Oral Daily    acyclovir  400 mg Oral Daily     Continuous Infusions:    sodium chloride 125 mL/hr at 10/09/17 1929     PRN Meds: LORazepam, albuterol sulfate HFA, oxyCODONE, sodium chloride flush, acetaminophen, ondansetron, diphenhydrAMINE    Data:     Past Medical History:   has a past medical history of Multiple myeloma ([address] and Thoracic or lumbosacral neuritis or radiculitis, unspecified. Social History:   reports that he has never smoked. He has quit using smokeless tobacco. He reports that he drinks alcohol. He reports that he does not use drugs. Family History:   Family History   Problem Relation Age of Onset    Other Father      \"heart issues\"   [place] Cancer Mother      lung       Vitals:  /63   Pulse 85   Temp 97.7 °F (36.5 °C) (Oral)   Resp 16   Ht 5' 10\" (1.778 m)   Wt 200 lb 9.6 oz (91 kg)   SpO2 97%   BMI 28.78 kg/m²   Temp (24hrs), Av.7 °F (36.5 °C), Min:97.5 °F (36.4 °C), Max:97.9 °F (36.6 °C)    No results for input(s): POCGLU in the last 72 hours. I/O (24Hr):     Intake/Output Summary (Last 24 hours) at 10/10/17 0927  Last data filed at 10/10/17 0413   Gross per 24 hour   Intake             3374 ml   Output             4825 ml   Net            -1451 ml       Labs:    Hematology:  Recent Labs      10/08/17   0633  10/09/17   0705  10/10/17   0645   WBC  1.2*  1.7*  1.9*   RBC  2.64*  2.82*  2.92*   HGB  8.5*  8.9*  9.1*   HCT  24.9*  26.5*  26.8*   MCV  94.5  94.0  91.8   MCH  32.3  31.6  31.0   MCHC  34.1  33.6  33.8   RDW  15.3*  14.2  13.7   PLT  43*  46*  46*   MPV  7.6  NOT REPORTED  NOT REPORTED     Chemistry:  Recent Labs      10/07/17   1928  10/08/17   0633  10/08/17   1903  10/09/17   0705  10/10/17   0645   NA  138  141  139  144  145*   K  4.9  5.3  4.5  4.8  4.6   CL  104  106  102  106  108*   CO2  20  23  23  25  22   GLUCOSE  98  97  111*  103*  126*   BUN  30*  31*  30*  32*  32*   CREATININE  3.63*  3.90*  3.87*  3.83*  3.42*   MG   --   1.9   --   1.8  1.6   ANIONGAP  14  12  14  13  15   LABGLOM  17*  16*  16*  16*  18*   GFRAA  21*  19*  19*  19*  22*   CALCIUM  10.7*  11.0*  11.2*  11.4*  10.9*   PHOS   --   5.5*   --   4.8*  4.5   CKTOTAL  127   --    --    --    -- Recent Labs      10/08/17   0633   URICACID  8.1*         Lab Results   Component Value Date/Time    SPECIAL NOT REPORTED 02/28/2016 11:34 PM     Lab Results   Component Value Date/Time    CULTURE NO GROWTH 02/28/2016 11:34 PM    CULTURE  02/28/2016 11:34 PM     Performed at [facility], [address] [place] [address] [phone]       No results found for: POCPH, PHART, PH, POCPCO2, HHT2OFM, PCO2, POCPO2, PO2ART, PO2, POCHCO3, VAW7YWG, HCO3, NBEA, PBEA, BEART, BE, THGBART, THB, JDJ9JJL, JYCU8MFX, N5PQXHTH, O2SAT, FIO2    Radiology:    Nothing new    Physical Examination:        General appearance:  alert, cooperative and no distress  Mental Status:  oriented to person, place and time and normal affect  Lungs:  clear to auscultation bilaterally, normal effort  Heart:  regular rate and rhythm, no murmur  Abdomen:  soft, nontender, nondistended, normal bowel sounds, no masses, hepatomegaly, splenomegaly  Extremities:  no edema, redness, tenderness in the calves  Skin:  no gross lesions, rashes, induration  [place] in place with good urine output    Assessment:        Primary Problem  Acute kidney injury [facility])    Active Hospital Problems    Diagnosis Date Noted    Acute encephalopathy [G93.40] 10/09/2017     Priority: High    Hypercalcemia [E83.52] 10/10/2017    Acute kidney injury ([address] [N17.9] 10/06/2017    Pancytopenia ([address] [D61.818] 10/06/2017    Multiple myeloma ([facility] [C90.00] 10/06/2017       Plan:        1. Cont ivf and calcitonin for hypercalcemia  2. Monitor renal fcn  3. May need renal biopsy  4.  Appreciate renal and heme consults    Mercedes Brandt Blood, DO  10/10/2017  9:27 AM

## 2017-10-11 NOTE — PROGRESS NOTES
Patient continues to be confused this shift setting off bed alarm several times, placed call in room to wife and had her speak with patient to calm and reassure. HS medications given shortly after phone conversation, patient then removed telemetry monitor, became verbal and restless. Will attempt to replace monitor after a cool down period.

## 2017-10-11 NOTE — PROGRESS NOTES
Spoke with Adele Anthony CNP this AM, received order for Senna + Prune Juce, cannot give mag citrate or milk of mag d/t kidney injury.

## 2017-10-11 NOTE — PROGRESS NOTES
[facility]    Progress Note    10/11/2017    9:32 AM    Name:   Patricia Reynolds  MRN:     [id number]     Acct:      [de-identified]   Room:   [address] [place] [address] Day:  5  Admit Date:  10/6/2017 10:28 AM    PCP:   Diane Ignacio MD  Code Status:  Full Code    Subjective:     C/C:   Chief Complaint   Patient presents with    Other     voice hoarse slow to respond     Interval History Status: worsened. More confused and agitated  Not making sense sometimes  Denies c/o  Thinks he will die before leaving hospital    Brief History: This is a 15-year-old white male was admitted with intermittent confusion and speech problems.  He is found have acute renal failure in the emergency room and has been admitted to the hospital.  He's been treated with IV fluids and nephrology consultation.     He has a history of multiple myeloma and has been treated with chemotherapy, oncology evaluation Requested     In hospital for 5 days    Review of Systems:     Constitutional:  negative for chills, fevers, sweats  Respiratory:  negative for cough, dyspnea on exertion, shortness of breath, wheezing  Cardiovascular:  negative for chest pain, chest pressure/discomfort, lower extremity edema, palpitations  Gastrointestinal:  negative for abdominal pain, constipation, diarrhea, nausea, vomiting  Neurological:  negative for dizziness, headache    Medications:      Allergies:  No Known Allergies    Current Meds:   Scheduled Meds:    polyethylene glycol  17 g Oral Daily    aspirin  81 mg Oral Daily    oxyCODONE  40 mg Oral Q12H    sodium chloride flush  10 mL Intravenous 2 times per day    famotidine  20 mg Oral Daily    acyclovir  400 mg Oral Daily     Continuous Infusions:    sodium chloride 125 mL/hr at 10/10/17 1815     PRN Meds: LORazepam, albuterol sulfate HFA, oxyCODONE, sodium chloride flush, acetaminophen, ondansetron, diphenhydrAMINE    Data:     Past Medical History:

## 2017-10-11 NOTE — PROGRESS NOTES
Notified o/c Janeane Parent CNP of patient's episode of confusion and confrontation with staff. Telesitter initiated.

## 2017-10-11 NOTE — PROGRESS NOTES
Nephrology Progress Note      SUBJECTIVE       Pt was seen and examined. No acute issues overnite. Stable hemodynamics . Patient is still somewhat lethargic and his mental status is not back to his baseline. His creatinine continues to progressively improve. His calcium is down to 10.3 as well. He continues to have very decent urine output. OBJECTIVE      CURRENT TEMPERATURE:  Temp: 97.9 °F (36.6 °C)  MAXIMUM TEMPERATURE OVER 24HRS:  Temp (24hrs), Av.9 °F (36.6 °C), Min:97.5 °F (36.4 °C), Max:98.2 °F (36.8 °C)    CURRENT RESPIRATORY RATE:  Resp: 18  CURRENT PULSE:  Pulse: 89  CURRENT BLOOD PRESSURE:  BP: (!) 126/56  24HR BLOOD PRESSURE RANGE:  Systolic (95UWW), XHZ:455 , Min:99 , MPC:316   ; Diastolic (28RDJ), BLAIR:41, Min:49, Max:81    24HR INTAKE/OUTPUT:    Intake/Output Summary (Last 24 hours) at 10/11/17 1305  Last data filed at 10/11/17 1108   Gross per 24 hour   Intake             3456 ml   Output             3175 ml   Net              281 ml     WEIGHT :Patient Vitals for the past 96 hrs (Last 3 readings):   Weight   10/11/17 0809 202 lb 12.8 oz (92 kg)   10/09/17 0725 200 lb 9.6 oz (91 kg)     PHYSICAL EXAM      GENERAL APPEARANCE: Lethargic and confused   SKIN: warm and dry, no rash or erythema  EYES: conjunctivae normal and sclera anicteric  ENT: no thrush no pharyngeal congestion   NECK:   No JVD. No carotid bruits and no carotid lymphadenopathy . PULMONARY: lungs are clear to auscultation. No Wheezing, no ronchi . CADRDIOVASCULAR: S1 and S2 normal NO S3 and NO S4 . No rubs , no murmur. ABDOMEN: soft nontender, bowel sounds present, no organomegaly, no ascites.        EXTREMITIES: no cyanosis, clubbing or edema     CURRENT MEDICATIONS        magnesium sulfate 1 g in dextrose 5% 100 mL IVPB Q1H   LORazepam (ATIVAN) injection 0.5 mg Q6H PRN   LORazepam (ATIVAN) injection 1 mg Once   LORazepam (ATIVAN) tablet 0.5 mg Q6H PRN   polyethylene glycol (GLYCOLAX) packet 17 g Daily   0.9 % sodium chloride infusion Continuous   aspirin chewable tablet 81 mg Daily   albuterol sulfate  (90 Base) MCG/ACT inhaler 2 puff Q6H PRN   oxyCODONE (OXYCONTIN) extended release tablet 40 mg Q12H   oxyCODONE (ROXICODONE) immediate release tablet 5 mg Q4H PRN   sodium chloride flush 0.9 % injection 10 mL 2 times per day   sodium chloride flush 0.9 % injection 10 mL PRN   acetaminophen (TYLENOL) tablet 650 mg Q4H PRN   ondansetron (ZOFRAN) injection 4 mg Q6H PRN   famotidine (PEPCID) tablet 20 mg Daily   acyclovir (ZOVIRAX) capsule 400 mg Daily   diphenhydrAMINE (BENADRYL) tablet 25 mg Nightly PRN         LABS      CBC: Recent Labs      10/09/17   0705  10/10/17   0645  10/11/17   0636   WBC  1.7*  1.9*  2.0*   RBC  2.82*  2.92*  2.81*   HGB  8.9*  9.1*  8.8*   HCT  26.5*  26.8*  26.2*   MCV  94.0  91.8  93.4   MCH  31.6  31.0  31.3   MCHC  33.6  33.8  33.5   RDW  14.2  13.7  13.7   PLT  46*  46*  45*   MPV  NOT REPORTED  NOT REPORTED  NOT REPORTED      BMP: Recent Labs      10/09/17   0705  10/10/17   0645  10/11/17   0636   NA  144  145*  145*   K  4.8  4.6  4.3   CL  106  108*  109*   CO2  25  22  20   BUN  32*  32*  32*   CREATININE  3.83*  3.42*  2.96*   GLUCOSE  103*  126*  125*   CALCIUM  11.4*  10.9*  10.3      BNP:No results found for: BNP  PHOSPHORUS:    Recent Labs      10/09/17   0705  10/10/17   0645  10/11/17   0636   PHOS  4.8*  4.5  3.5     MAGNESIUM:   Recent Labs      10/09/17   0705  10/10/17   0645  10/11/17   0636   MG  1.8  1.6  1.5*       SPEP: Lab Results   Component Value Date    PROT 6.4 10/06/2017    PATH Pending 10/09/2017     UPEP:   Lab Results   Component Value Date    TPU 95 10/09/2017    TPU 95 10/09/2017      URINE SODIUM:    Lab Results   Component Value Date    KECIA 51 10/06/2017      URINE CREATININE:  Lab Results   Component Value Date    LABCREA 52.9 10/07/2017     URINE PROTEIN:    Lab Results   Component Value Date    TPU 95 10/09/2017    TPU 95 10/09/2017     URINALYSIS:  U/A: Lab Results   Component Value Date    NITRU NEGATIVE 10/06/2017    COLORU YELLOW 10/06/2017    PHUR 5.5 10/06/2017    WBCUA 0 TO 2 10/06/2017    RBCUA 2 TO 5 10/06/2017    MUCUS 1+ 10/06/2017    TRICHOMONAS NOT REPORTED 10/06/2017    YEAST NOT REPORTED 10/06/2017    BACTERIA FEW 10/06/2017    SPECGRAV 1.020 10/06/2017    LEUKOCYTESUR NEGATIVE 10/06/2017    UROBILINOGEN Normal 10/06/2017    BILIRUBINUR NEGATIVE 10/06/2017    GLUCOSEU NEGATIVE 10/06/2017    KETUA NEGATIVE 10/06/2017    AMORPHOUS NOT REPORTED 10/06/2017         ASSESSMENT      1. SHEKHAR : Quite likely related to hypercalcemia associated vasoconstriction and mild volume depletion. Creatinine improving. Calcium has responded to calcitonin and is down to 10.3  2. Multiple myeloma:  3. High likelihood that he has underlying LC DD   4. Pancytopenia likely because of his chemo    PLAN      1. I would continue on calcitonin one additional day   2. He is scheduled to have MRI. If MRI is negative and he still has ongoing issues with lethargy and confusion consider LP and neuro consult. Discussed with wife in detail  3. Follow up labs ordered. 4. Following along       Please do not hesitate to call with questions.     Electronically signed by Addison Councilman, MD on 10/11/2017 at 1:05 PM

## 2017-10-12 NOTE — PROGRESS NOTES
(MIACALCIN) injection 300 Units BID   zoledronic acid (ZOMETA) 4 mg in sodium chloride 0.9 % 100 mL IVPB Once   LORazepam (ATIVAN) injection 0.5 mg Q6H PRN   polyethylene glycol (GLYCOLAX) packet 17 g Daily   0.9 % sodium chloride infusion Continuous   aspirin chewable tablet 81 mg Daily   albuterol sulfate  (90 Base) MCG/ACT inhaler 2 puff Q6H PRN   oxyCODONE (OXYCONTIN) extended release tablet 40 mg Q12H   oxyCODONE (ROXICODONE) immediate release tablet 5 mg Q4H PRN   sodium chloride flush 0.9 % injection 10 mL 2 times per day   sodium chloride flush 0.9 % injection 10 mL PRN   acetaminophen (TYLENOL) tablet 650 mg Q4H PRN   ondansetron (ZOFRAN) injection 4 mg Q6H PRN   famotidine (PEPCID) tablet 20 mg Daily   acyclovir (ZOVIRAX) capsule 400 mg Daily   diphenhydrAMINE (BENADRYL) tablet 25 mg Nightly PRN         LABS      CBC: Recent Labs      10/10/17   0645  10/11/17   0636  10/12/17   0645   WBC  1.9*  2.0*  2.1*   RBC  2.92*  2.81*  2.85*   HGB  9.1*  8.8*  8.9*   HCT  26.8*  26.2*  26.5*   MCV  91.8  93.4  93.1   MCH  31.0  31.3  31.5   MCHC  33.8  33.5  33.8   RDW  13.7  13.7  14.9*   PLT  46*  45*  49*   MPV  NOT REPORTED  NOT REPORTED  7.0      BMP: Recent Labs      10/10/17   0645  10/11/17   0636  10/12/17   0645   NA  145*  145*  144   K  4.6  4.3  3.9   CL  108*  109*  110*   CO2  22  20  21   BUN  32*  32*  31*   CREATININE  3.42*  2.96*  2.90*   GLUCOSE  126*  125*  136*   CALCIUM  10.9*  10.3  11.1*        PHOSPHORUS:    Recent Labs      10/10/17   0645  10/11/17   0636   PHOS  4.5  3.5     MAGNESIUM:   Recent Labs      10/10/17   0645  10/11/17   0636  10/12/17   0645   MG  1.6  1.5*  2.0       SPEP: Lab Results   Component Value Date    PROT 6.4 10/06/2017    PATH ELECTRONICALLY SIGNED.  Osmar Patel M.D. 10/09/2017     UPEP:   Lab Results   Component Value Date    TPU 95 10/09/2017    TPU 95 10/09/2017      URINE SODIUM:    Lab Results   Component Value Date    KECIA 51 10/06/2017 URINE CREATININE:  Lab Results   Component Value Date    LABCREA 52.9 10/07/2017     URINE PROTEIN:    Lab Results   Component Value Date    TPU 95 10/09/2017    TPU 95 10/09/2017     URINALYSIS:  U/A: Lab Results   Component Value Date    NITRU NEGATIVE 10/06/2017    COLORU YELLOW 10/06/2017    PHUR 5.5 10/06/2017    WBCUA 0 TO 2 10/06/2017    RBCUA 2 TO 5 10/06/2017    MUCUS 1+ 10/06/2017    TRICHOMONAS NOT REPORTED 10/06/2017    YEAST NOT REPORTED 10/06/2017    BACTERIA FEW 10/06/2017    SPECGRAV 1.020 10/06/2017    LEUKOCYTESUR NEGATIVE 10/06/2017    UROBILINOGEN Normal 10/06/2017    BILIRUBINUR NEGATIVE 10/06/2017    GLUCOSEU NEGATIVE 10/06/2017    KETUA NEGATIVE 10/06/2017    AMORPHOUS NOT REPORTED 10/06/2017         ASSESSMENT      1. SHEKHAR :Quite likely related to [facility], hypercalcemia causing vasoconstriction . his creatinine is overall improved since admission although not close to his baseline yet  2. HTN:  3. Hypercalcemia: And I suspect some of this is responsible for his altered mental status just as well   4. Multiple myeloma    PLAN      1. Will order 1 dose of Zometa, and give him 2 additional doses of calcitonin. Continue fluids as well   2. Neuro consult noted  3. Follow up labs ordered. 4. Following along       Please do not hesitate to call with questions.     Electronically signed by Lizzy Dey MD on 10/12/2017 at 11:17 AM

## 2017-10-12 NOTE — PROGRESS NOTES
[id number] [facility]    Progress Note    10/12/2017    9:57 AM    Name:   Marito Rivas  MRN:     [id number]     Acct:      [de-identified]   Room:   [address] [place] [address] Day:  6  Admit Date:  10/6/2017 10:28 AM    PCP:   Quentin Parsons MD  Code Status:  Full Code    Subjective:     C/C:   Chief Complaint   Patient presents with    Other     voice hoarse slow to respond     Interval History Status: improved. A little more alert today still intermittent confusion  He is unsure why he is at the hospital.  He just says it's because he needed a test    Brief History: This is a 39-year-old white male was admitted with intermittent confusion and speech problems.  He is found have acute renal failure in the emergency room and has been admitted to the hospital.  He's been treated with IV fluids and nephrology consultation.     He has a history of multiple myeloma and has been treated with chemotherapy, oncology evaluation Requested     In hospital for 6 days    Review of Systems:     Constitutional:  negative for chills, fevers, sweats  Respiratory:  negative for cough, dyspnea on exertion, shortness of breath, wheezing  Cardiovascular:  negative for chest pain, chest pressure/discomfort, lower extremity edema, palpitations  Gastrointestinal:  negative for abdominal pain, constipation, diarrhea, nausea, vomiting  Neurological:  negative for dizziness, headache    Medications:      Allergies:  No Known Allergies    Current Meds:   Scheduled Meds:    polyethylene glycol  17 g Oral Daily    aspirin  81 mg Oral Daily    oxyCODONE  40 mg Oral Q12H    sodium chloride flush  10 mL Intravenous 2 times per day    famotidine  20 mg Oral Daily    acyclovir  400 mg Oral Daily     Continuous Infusions:    sodium chloride 125 mL/hr at 10/12/17 0519     PRN Meds: LORazepam, albuterol sulfate HFA, oxyCODONE, sodium chloride flush, acetaminophen, ondansetron, TRIG, VLDL, [id number], PHENYTOIN, PHENYF, URICACID, POCGLU in the last 72 hours. Lab Results   Component Value Date/Time    SPECIAL NOT REPORTED 02/28/2016 11:34 PM     Lab Results   Component Value Date/Time    CULTURE NO GROWTH 02/28/2016 11:34 PM    CULTURE  02/28/2016 11:34 PM     Performed at [address] [place] [address], [address] [place] [address] [phone]       No results found for: POCPH, PHART, PH, POCPCO2, PSF5RCP, PCO2, POCPO2, PO2ART, PO2, POCHCO3, KZY4BOX, HCO3, NBEA, PBEA, BEART, BE, THGBART, THB, EMN2CUH, SMFP7RTH, E7XIBJTT, O2SAT, FIO2    Radiology:    MRI of the brain negative    Physical Examination:        General appearance:  alert, cooperative and no distress  Mental Status:  oriented to person, place and normal affect  Lungs:  clear to auscultation bilaterally, normal effort  Heart:  regular rate and rhythm, no murmur  Abdomen:  soft, nontender, nondistended, normal bowel sounds, no masses, hepatomegaly, splenomegaly  Extremities:  no edema, redness, tenderness in the calves  Skin:  no gross lesions, rashes, induration    Assessment:        Primary Problem  Acute kidney injury [facility])    C/Andreina Cheung 1106 Problems    Diagnosis Date Noted    Acute encephalopathy [G93.40] 10/09/2017     Priority: High    Hypercalcemia [E83.52] 10/10/2017    Acute kidney injury ([facility] [address] [N17.9] 10/06/2017    Pancytopenia ([address] [D61.818] 10/06/2017    Multiple myeloma ([address] [C90.00] 10/06/2017       Plan:        1. Cont to monitor mental status  2. Renal fcn slowly improving  3. ?dc aragon  4. Still requiring sitter at bedside  5. Neuro eval appreciated  6. Discussed with Dr. Bess Leon  7. Check ionized calcium in the morning  8. zometa ordered by nephrology  9.  Await neurology decision regarding lumbar puncture    Peyman Nolen DO  10/12/2017  9:57 AM

## 2017-10-12 NOTE — PROGRESS NOTES
Date:                           10/11/2017  Patient name:           Gi Lipluis  Date of admission:  10/6/2017 10:28 AM  MRN:   [id number]  YOB: 1955  PCP:                           Mariah Clancy MD      Subjective:       Patient seen and examined. Wife at bedside  Had agitation episode last night  Calcium is getting better  MRI brain awaited today   he is still confused today    REVIEW OF SYSTEMS:    unable to obtain reliable review of systems due to patient being very lethargic likely from Ativan and hypercalcemia      Objective:     Vitals: BP (!) 145/86   Pulse 102   Temp 97.9 °F (36.6 °C) (Oral)   Resp 20   Ht 5' 10\" (1.778 m)   Wt 202 lb 12.8 oz (92 kg)   SpO2 100%   BMI 29.10 kg/m²   General appearance - Not in pain or distress  Mental status - mildly confused,   Eyes - pupils equal and reactive, extraocular eye movements intact  Ears - bilateral TM's and external ear canals normal  Nose - normal and patent, no erythema, discharge or polyps  Mouth - mucous membranes moist, pharynx normal without lesions. Hoarseness of voice. Neck - supple, no significant adenopathy  Lymphatics - no palpable lymphadenopathy, no hepatosplenomegaly  Chest - clear to auscultation, no wheezes, rales or rhonchi, symmetric air entry  Heart - normal rate, regular rhythm, normal S1, S2, no murmurs, rubs, clicks or gallops  Abdomen - soft, nontender, nondistended, no masses or organomegaly  Neurological - confused.    but follows simple commands  Musculoskeletal - no joint tenderness, deformity or swelling  Extremities - peripheral pulses normal, no pedal edema, no clubbing or cyanosis  Skin - normal coloration and turgor, no rashes, no suspicious skin lesions noted           Data:    I/O this shift:  In: -   Out: 750 [Urine:750]  In: 908 [I.V.:908]  Out: 2350 [Urine:2350]    CBC:   Recent Labs      10/09/17   0705  10/10/17   0645  10/11/17   0636   WBC  1.7*  1.9*  2.0*   HGB  8.9* 41 - 53 %    MCV 93.1 80 - 100 fL    MCH 31.2 26 - 34 pg    MCHC 33.6 31 - 37 g/dL    RDW 14.0 11.5 - 14.5 %    Platelets 43 (L) 825 - 400 k/uL    MPV NOT REPORTED 6.0 - 12.0 fL   Magnesium   Result Value Ref Range    Magnesium 1.9 1.6 - 2.6 mg/dL   Phosphorus   Result Value Ref Range    Phosphorus 5.5 (H) 2.5 - 4.5 mg/dL   Basic Metabolic Panel   Result Value Ref Range    Glucose 97 70 - 99 mg/dL    BUN 31 (H) 8 - 23 mg/dL    CREATININE 3.90 (H) 0.70 - 1.20 mg/dL    Bun/Cre Ratio 8 (L) 9 - 20    Calcium 11.0 (H) 8.6 - 10.4 mg/dL    Sodium 141 135 - 144 mmol/L    Potassium 5.3 3.7 - 5.3 mmol/L    Chloride 106 98 - 107 mmol/L    CO2 23 20 - 31 mmol/L    Anion Gap 12 9 - 17 mmol/L    GFR Non-African American 16 (L) >60 mL/min    GFR  19 (L) >60 mL/min    GFR Comment          GFR Staging NOT REPORTED    CBC Auto Differential   Result Value Ref Range    WBC 1.2 (LL) 3.5 - 11.0 k/uL    RBC 2.64 (L) 4.5 - 5.9 m/uL    Hemoglobin 8.5 (L) 13.5 - 17.5 g/dL    Hematocrit 24.9 (L) 41 - 53 %    MCV 94.5 80 - 100 fL    MCH 32.3 26 - 34 pg    MCHC 34.1 31 - 37 g/dL    RDW 15.3 (H) 11.5 - 14.5 %    Platelets 43 (L) 445 - 400 k/uL    MPV 7.6 6.0 - 12.0 fL    Differential Type NOT REPORTED     WBC Morphology NOT REPORTED     RBC Morphology NOT REPORTED     Platelet Estimate NOT REPORTED     Seg Neutrophils 51 %    Lymphocytes 36 %    Monocytes 9 %    Eosinophils % 1 %    Basophils 1 %    Metamyelocytes 1 (H) 0 %    Myelocytes 1 (H) 0 %    Segs Absolute 0.62 (L) 1.8 - 7.7 k/uL    Absolute Lymph # 0.43 (L) 1.0 - 4.8 k/uL    Absolute Mono # 0.11 (L) 0.2 - 0.8 k/uL    Absolute Eos # 0.01 0.0 - 0.4 k/uL    Basophils # 0.01 0.0 - 0.2 k/uL    Metamyelocytes Absolute 0.01 (H) 0 k/uL    Myelocytes Absolute 0.01 (H) 0 k/uL    Morphology INCREASED BANDS PRESENT    Basic Metabolic Panel   Result Value Ref Range    Glucose 98 70 - 99 mg/dL    BUN 30 (H) 8 - 23 mg/dL    CREATININE 3.63 (H) 0.70 - 1.20 mg/dL    Bun/Cre Ratio 8 (L) 9 - 20    Calcium 10.7 (H) 8.6 - 10.4 mg/dL    Sodium 138 135 - 144 mmol/L    Potassium 4.9 3.7 - 5.3 mmol/L    Chloride 104 98 - 107 mmol/L    CO2 20 20 - 31 mmol/L    Anion Gap 14 9 - 17 mmol/L    GFR Non-African American 17 (L) >60 mL/min    GFR  21 (L) >60 mL/min    GFR Comment          GFR Staging NOT REPORTED    Microalbumin, Ur   Result Value Ref Range    Microalb, Ur 49 (H) <21 mg/L    Creatinine, Ur 52.9 39.0 - 259.0 mg/dL    Microalb/Crt.  Ratio 93 (H) <17 mcg/mg creat   Creatine Kinase   Result Value Ref Range    Total  39 - 308 U/L   Basic Metabolic Panel   Result Value Ref Range    Glucose 111 (H) 70 - 99 mg/dL    BUN 30 (H) 8 - 23 mg/dL    CREATININE 3.87 (H) 0.70 - 1.20 mg/dL    Bun/Cre Ratio 8 (L) 9 - 20    Calcium 11.2 (H) 8.6 - 10.4 mg/dL    Sodium 139 135 - 144 mmol/L    Potassium 4.5 3.7 - 5.3 mmol/L    Chloride 102 98 - 107 mmol/L    CO2 23 20 - 31 mmol/L    Anion Gap 14 9 - 17 mmol/L    GFR Non-African American 16 (L) >60 mL/min    GFR  19 (L) >60 mL/min    GFR Comment          GFR Staging NOT REPORTED    Uric Acid   Result Value Ref Range    Uric Acid 8.1 (H) 3.4 - 7.0 mg/dL   Magnesium   Result Value Ref Range    Magnesium 1.8 1.6 - 2.6 mg/dL   Phosphorus   Result Value Ref Range    Phosphorus 4.8 (H) 2.5 - 4.5 mg/dL   Basic Metabolic Panel   Result Value Ref Range    Glucose 103 (H) 70 - 99 mg/dL    BUN 32 (H) 8 - 23 mg/dL    CREATININE 3.83 (H) 0.70 - 1.20 mg/dL    Bun/Cre Ratio 8 (L) 9 - 20    Calcium 11.4 (H) 8.6 - 10.4 mg/dL    Sodium 144 135 - 144 mmol/L    Potassium 4.8 3.7 - 5.3 mmol/L    Chloride 106 98 - 107 mmol/L    CO2 25 20 - 31 mmol/L    Anion Gap 13 9 - 17 mmol/L    GFR Non-African American 16 (L) >60 mL/min    GFR  19 (L) >60 mL/min    GFR Comment          GFR Staging NOT REPORTED    CBC Auto Differential   Result Value Ref Range    WBC 1.7 (L) 3.5 - 11.0 k/uL    RBC 2.82 (L) 4.5 - 5.9 m/uL    Hemoglobin 8.9 (L) 13.5 - 17.5 g/dL    Hematocrit 26.5 (L) 41 - 53 %    MCV 94.0 80 - 100 fL    MCH 31.6 26 - 34 pg    MCHC 33.6 31 - 37 g/dL    RDW 14.2 11.5 - 14.5 %    Platelets 46 (L) 087 - 400 k/uL    MPV NOT REPORTED 6.0 - 12.0 fL    Differential Type NOT REPORTED     WBC Morphology NOT REPORTED     RBC Morphology NOT REPORTED     Platelet Estimate NOT REPORTED     Seg Neutrophils 52 %    Lymphocytes 27 %    Monocytes 13 %    Eosinophils % 4 %    Basophils 0 %    Metamyelocytes 2 (H) 0 %    Myelocytes 2 (H) 0 %    Segs Absolute 0.89 (L) 1.8 - 7.7 k/uL    Absolute Lymph # 0.46 (L) 1.0 - 4.8 k/uL    Absolute Mono # 0.22 0.2 - 0.8 k/uL    Absolute Eos # 0.07 0.0 - 0.4 k/uL    Basophils # 0.00 0.0 - 0.2 k/uL    Metamyelocytes Absolute 0.03 (H) 0 k/uL    Myelocytes Absolute 0.03 (H) 0 k/uL    Morphology INCREASED BANDS PRESENT    Vitamin B12 & Folate   Result Value Ref Range    Vitamin B-12 965 (H) 211 - 946 pg/mL    Folate 13.5 >4.8 ng/mL   IMMUNOFIXATION URINE RANDOM PROFILE   Result Value Ref Range    Urine IFX Specimen . CLEAN CATCH URINE     Volume . CLEAN CATCH URINE mL    Urine Total Protein 95 mg/dL    Urine IFX Interp       FREE MONOCLONAL LIGHT CHAINS ARE PRESENT, IDENTIFIED AS   [place]/Lambda Free Lt Chains, Serum Quant   Result Value Ref Range    Kappa Free Light Chains .08 (H) 0.37 - 1.94 mg/dL    Lambda Free Light Chains QNT 0.89 0.57 - 2.63 mg/dL    Free Kappa/Lambda Ratio 488.85 (H) 0.26 - 1.65   Magnesium   Result Value Ref Range    Magnesium 1.6 1.6 - 2.6 mg/dL   Phosphorus   Result Value Ref Range    Phosphorus 4.5 2.5 - 4.5 mg/dL   Basic Metabolic Panel   Result Value Ref Range    Glucose 126 (H) 70 - 99 mg/dL    BUN 32 (H) 8 - 23 mg/dL    CREATININE 3.42 (H) 0.70 - 1.20 mg/dL    Bun/Cre Ratio 9 9 - 20    Calcium 10.9 (H) 8.6 - 10.4 mg/dL    Sodium 145 (H) 135 - 144 mmol/L    Potassium 4.6 3.7 - 5.3 mmol/L    Chloride 108 (H) 98 - 107 mmol/L    CO2 22 20 - 31 mmol/L    Anion Gap 15 9 - 17 mmol/L    GFR Non-African American 18 (L) >60 mL/min    GFR  22 (L) >60 mL/min    GFR Comment          GFR Staging NOT REPORTED    CBC Auto Differential   Result Value Ref Range    WBC 1.9 (L) 3.5 - 11.0 k/uL    RBC 2.92 (L) 4.5 - 5.9 m/uL    Hemoglobin 9.1 (L) 13.5 - 17.5 g/dL    Hematocrit 26.8 (L) 41 - 53 %    MCV 91.8 80 - 100 fL    MCH 31.0 26 - 34 pg    MCHC 33.8 31 - 37 g/dL    RDW 13.7 11.5 - 14.5 %    Platelets 46 (L) 867 - 400 k/uL    MPV NOT REPORTED 6.0 - 12.0 fL    Differential Type NOT REPORTED     WBC Morphology NOT REPORTED     RBC Morphology NOT REPORTED     Platelet Estimate NOT REPORTED     Seg Neutrophils 57 %    Lymphocytes 21 %    Monocytes 19 %    Eosinophils % 2 %    Basophils 1 %    Segs Absolute 1.10 (L) 1.8 - 7.7 k/uL    Absolute Lymph # 0.40 (L) 1.0 - 4.8 k/uL    Absolute Mono # 0.40 0.2 - 0.8 k/uL    Absolute Eos # 0.00 0.0 - 0.4 k/uL    Basophils # 0.00 0.0 - 0.2 k/uL   Protein Electrophoresis, Urine   Result Value Ref Range    Specimen Type . Random Urine     Urine Total Protein 95 mg/dL    P E Interpretation, U       FREE MONOCLONAL LIGHT CHAINS ARE PRESENT, IDENTIFIED AS    Pathologist ELECTRONICALLY SIGNED. Danie Cisneros M.D.     Magnesium   Result Value Ref Range    Magnesium 1.5 (L) 1.6 - 2.6 mg/dL   Phosphorus   Result Value Ref Range    Phosphorus 3.5 2.5 - 4.5 mg/dL   Basic Metabolic Panel   Result Value Ref Range    Glucose 125 (H) 70 - 99 mg/dL    BUN 32 (H) 8 - 23 mg/dL    CREATININE 2.96 (H) 0.70 - 1.20 mg/dL    Bun/Cre Ratio 11 9 - 20    Calcium 10.3 8.6 - 10.4 mg/dL    Sodium 145 (H) 135 - 144 mmol/L    Potassium 4.3 3.7 - 5.3 mmol/L    Chloride 109 (H) 98 - 107 mmol/L    CO2 20 20 - 31 mmol/L    Anion Gap 16 9 - 17 mmol/L    GFR Non-African American 22 (L) >60 mL/min    GFR  26 (L) >60 mL/min    GFR Comment          GFR Staging NOT REPORTED    CBC Auto Differential   Result Value Ref Range    WBC 2.0 (L) 3.5 - 11.0 k/uL    RBC 2.81 (L) 4.5 - 5.9 m/uL high glucose uptake identified within the sternum adjacent to left side of the sternal clavicular head measuring  <BR>approximate 2.5 cm in size likely presents a new area of metastatic deposition. Additional areas described above with increased glucose utilization  <BR>within the axial skeleton may represent minimal change compared to prior exam with superimposed chemotherapeutic effect and increased activity  <BR>diffusely involving the skeleton and marrow. <BR>2. There is no evidence for neck lymphadenopathy, pelvis or abdomen intraperitoneal adenopathy or metastatic deposition within the liver. <BR>3.  formerly reported small focus of abnormal tracer uptake in the left radius near the left elbow less conspicuous today. Xr Chest Standard (2 Vw)    Result Date: 10/6/2017  EXAMINATION: TWO VIEWS OF THE CHEST 10/6/2017 11:26 am COMPARISON: Chest x-ray from 02/28/2016 HISTORY: ORDERING SYSTEM PROVIDED HISTORY: [facility] TECHNOLOGIST PROVIDED HISTORY: Reason for exam:->AMS Ordering Physician Provided Reason for Exam: Altered mental status x 3 days. hx of mult myeloma Acuity: Acute Type of Exam: Initial FINDINGS: Left chest port catheter extends to the level of the superior atrial caval junction. There is stable relative elevation of the left hemidiaphragm. There is no focal airspace consolidation, pleural effusion or pneumothorax. The cardiomediastinal silhouette appears within normal limits and there is no pulmonary vascular congestion. Stable chronic fracture deformity of the proximal right clavicle. There are similar moderate hypertrophic degenerative changes of the visualized spine in shoulders. Visualized osseous structures appear moderately demineralized, but grossly intact, given the non dedicated imaging. Stable vertebroplasty changes identified. Stable nonspecific lucent lesion in the mid to distal diaphysis of the left clavicle. No focal airspace consolidation or pulmonary vascular congestion. Ct Head Wo Contrast    Result Date: 10/6/2017  EXAMINATION: CT OF THE HEAD WITHOUT CONTRAST  10/6/2017 11:14 am TECHNIQUE: CT of the head was performed without the administration of intravenous contrast. Dose modulation, iterative reconstruction, and/or weight based adjustment of the mA/kV was utilized to reduce the radiation dose to as low as reasonably achievable. COMPARISON: CT head February 28, 2016 HISTORY: ORDERING SYSTEM PROVIDED HISTORY: Altered mental status, history of multiple myeloma FINDINGS: Limited study due to artifacts. BRAIN/VENTRICLES: There is mild periventricular white matter low attenuation, likely related to mild chronic microvascular disease. There is no acute intracranial hemorrhage, mass effect or midline shift. No abnormal extra-axial fluid collection. The gray-white differentiation is maintained without evidence of an acute infarct. There is no evidence of hydrocephalus. ORBITS: The visualized portion of the orbits demonstrate no acute abnormality. SINUSES: There is scattered mild mucosal thickening in the paranasal sinuses. There is a retention cyst versus polyp in the right frontal sinus. The bilateral mastoid air cells are clear. SOFT TISSUES/SKULL:  There is a 1.5 cm lytic lesion in the right forehead calvarium, stable in size with mildly increased peripheral sclerosis, nonspecific, may be related to chronic sinusitis versus lesions related to history of multiple myeloma. No acute intracranial abnormality. Mild chronic microvascular disease. 1.5 cm lytic lesion in the right forehead calvarium, stable in size with mildly increased peripheral sclerosis, nonspecific, may be related to chronic sinusitis versus lesion related to history of multiple myeloma.      Us Renal Complete    Result Date: 10/6/2017  EXAMINATION: RETROPERITONEAL ULTRASOUND OF THE KIDNEYS AND URINARY BLADDER 10/6/2017 COMPARISON: None HISTORY: ORDERING SYSTEM PROVIDED HISTORY: acute renal failure 66-year-old male with acute renal failure and painful urination for 1 month FINDINGS: Kidneys: Right kidney measures 12.9 by 5.1 x 5.3 cm in greatest longitudinal, AP, and transverse dimensions for the total right renal volume of 180.6 mL. Right renal cortical thickness measures up to 1.6 cm. Left kidney measures 11.1 by 6.6 x 4.9 cm in greatest longitudinal, AP, and transverse dimensions for the total left renal volume of 188.1 mL. Left renal cortical thickness measures up to 2.1 cm. Bilateral corticomedullary differentiation is well-maintained. Color flow projects over the bilateral renal parenchyma. No hydronephrosis. No perinephric fluid. Bladder: Prevoid urinary bladder measures 10.5 x 8.5 x 8.9 cm in greatest longitudinal, AP, and transverse dimensions for the total urinary bladder volume of 521.8 mL. Postvoid urinary bladder measures 3.7 x 6.4 x 6.4 cm for a total postvoid urinary bladder volume of 79.5 mL.     1. Unremarkable renal ultrasound without hydronephrosis. 2. Moderate postvoid residual of the urinary bladder. Assessment      Chief Complaint   Patient presents with    Other     voice hoarse slow to respond     Patient Active Problem List   Diagnosis    Psychiatric diagnosis    Thoracic or lumbosacral neuritis or radiculitis, unspecified    Cancer ([address]    Hyponatremia    FUO (fever of unknown origin)    Bronchitis    Acute encephalopathy    Thrombocytopenia (HCC)    Acute kidney injury ([address]    Pancytopenia ([address]    Multiple myeloma ([address]    Hypercalcemia     Plan   Discussed with patients wife  I also discussed with nephrologist Dr Elodia Liu  Patient's free light chain ratio is significantly elevated suggestive of disease progression. Changed MS despite correction of calcium. Await MRI  If negative may need LP    Patient's pancytopenia and renal function is secondary to progressive disease. Hypercalcemia also contributing to renal dysfunction.         I spent more 35 than minutes examining, evaluating, reviewing data and counseling the patient. Declan Arreaga MD  Hematologist/Medical Oncologist    Cell: [phone]      This note is created with the assistance of a speech recognition program.  While intending to generate a document that actually reflects the content of the visit, the document can still have some errors including those of syntax and sound a like substitutions which may escape proof reading. It such instances, actual meaning can be extrapolated by contextual diversion.

## 2017-10-12 NOTE — PROGRESS NOTES
Physical Therapy  DATE: 10/12/2017    NAME: Lenore Goodpasture  MRN: [id number]   : 1955    Patient not seen this date for Physical Therapy due to:  [] Blood transfusion in progress  [] Cancel by RN  [] Hemodialysis  []  Refusal by Patient   [] Spine Precautions   [] Strict Bedrest  [] Surgery  [] Testing      [x] Other (RN, Dahlia Bloch reports patient was appropriate medically, went to check with PCT who is sitting one on one with patient, She state that patient was finally sleeping and not agitated, to please let him rest.)         [] PT being discontinued at this time. Patient independent. No further needs. [] PT being discontinued at this time as the patient has been transferred to hospice care. No further needs.     [facility]M [facility], hospitals

## 2017-10-12 NOTE — PLAN OF CARE
Problem: Falls - Risk of  Goal: Absence of falls  Outcome: Ongoing  Patient is fall risk per fall scale. Falling star on door. Fall sticker on armband. Hourly rounding performed. Personal belongings and call light within reach. Bed locked and in low position. Bed alarm on. Patient has  one on one nurse for this shift. Redirection and reorientation to situation and surroundings given as needed for confusion and frustration. Will continue to monitor.

## 2017-10-12 NOTE — PROGRESS NOTES
Pt remains fidgety, restless, pulling at tubes, clothing at times. Requires frequent redirection for safety. Cont to monitor.

## 2017-10-12 NOTE — PLAN OF CARE
[facility]    Second Visit Note  For more detailed information please refer to the progress note of the day      10/12/2017    7:01 PM    Name:   Gunjan Merritt  MRN:     [id number]     Acct:      [de-identified]   Room:   [facility]/1009-02   Day:  6  Admit Date:  10/6/2017 10:28 AM    PCP:   Elizabeth Blake MD  Code Status:  Full Code        Pt vitals were reviewed   New labs were reviewed   Patient was seen    Updated plan :     1. Still confused-eeg pending  2. Possible LP depending on neuro decision  3.  D/w wife in detail-all questions answered        Ted Nolen, DO  10/12/2017  7:01 PM

## 2017-10-12 NOTE — PROGRESS NOTES
Activity Restriction  Other position/activity restrictions: mild confusion, intention tremor noted B UE/LE's    Subjective   General  Chart Reviewed: No  Patient assessed for rehabilitation services?: Yes  Family / Caregiver Present: No (telesitter active, room sitter present throughout)  Pain Assessment  Patient Currently in Pain: Denies     Social/Functional History  Social/Functional History  Lives With: Spouse  Type of Home: House  Home Layout: One level  Home Access: Stairs to enter without rails  Entrance Stairs - Number of Steps: 3  Bathroom Shower/Tub: Walk-in shower  ADL Assistance: Independent  Homemaking Assistance: Needs assistance  Homemaking Responsibilities: Yes (shares chores with spouse)  Ambulation Assistance: Independent  Transfer Assistance: Independent  Active : Yes  Mode of Transportation: Car  Additional Comments: Pt with confusion sporadically during questions, inconsistent recall       Objective   Vision: Impaired  Vision Exceptions: Wears glasses at all times  Hearing: Within functional limits    Orientation  Overall Orientation Status: Impaired  Orientation Level: Oriented to person;Disoriented to place; Disoriented to time;Disoriented to situation     Balance  Sitting Balance: Stand by assistance  Standing Balance: Stand by assistance  Standing Balance  Time: <1min  Activity: static standing EOB  Sit to stand: Stand by assistance  Stand to sit: Contact guard assistance  Comment: impulsive to sit on EOB, increasing agitation while standing  ADL  Feeding: Supervision;Setup;Verbal cueing  Grooming: Verbal cueing;Minimal assistance  UE Bathing: Moderate assistance  LE Bathing: Moderate assistance  UE Dressing: Moderate assistance  LE Dressing: Moderate assistance  Toileting:  Moderate assistance  Additional Comments: verbal cues required for all for initiation, sequencing, and attention  Tone RUE  RUE Tone: Normotonic  Tone LUE  LUE Tone: Normotonic  Coordination  Movements Are Fluid And Coordinated: No  Coordination and Movement description: Intention tremors;Right UE;Left UE;Gross motor impairments; Fine motor impairments     Bed mobility  Rolling to Right: Stand by assistance  Supine to Sit: Stand by assistance  Scooting: Stand by assistance  Comment: Pt impulsive to stand, sit-supine throughout bed mobility  Transfers  Sit to stand: Stand by assistance  Stand to sit: Contact guard assistance     Cognition  Overall Cognitive Status: Exceptions  Arousal/Alertness: Delayed responses to stimuli;Inconsistent responses to stimuli  Following Commands: Inconsistently follows commands  Attention Span: Attends with cues to redirect; Difficulty attending to directions  Memory: Appears intact  Safety Judgement: Decreased awareness of need for assistance;Decreased awareness of need for safety  Problem Solving: Assistance required to correct errors made;Assistance required to identify errors made;Assistance required to implement solutions  Insights: Not aware of deficits  Initiation: Requires cues for all  Sequencing: Requires cues for all        Sensation  Overall Sensation Status: WFL        LUE AROM (degrees)  LUE AROM : WFL  Left Hand AROM (degrees)  Left Hand AROM: WFL  RUE AROM (degrees)  RUE AROM : WFL  Right Hand AROM (degrees)  Right Hand AROM: WFL  LUE Strength  Gross LUE Strength: WFL  L Hand Grasp: 5/5  RUE Strength  Gross RUE Strength: WFL  R Hand Grasp: 5/5     Assessment   Performance deficits / Impairments: Decreased ADL status; Decreased functional mobility ; Decreased balance;Decreased high-level IADLs;Decreased safe awareness;Decreased cognition  Assessment: Pt supine in bed upon arrival. Pt supine-sit EOB, sit-stand, standing balance, stand-sit, sitting balance, sit-supine with assist level listed above. Pt required MAX VC throughout session to attend to commands, to stop handing aragon catheter and bag, and orientation to situation.  Pt required MOD A for donning/doffing socks and Pt was unable to understand command, initiate or sequence task. Pt supine in bed with sitter present and telesitter active upon exit. Treatment Diagnosis: multiple myeloma and intermittent confusion  Prognosis: Good  Decision Making: Medium Complexity  Patient Education: OT POC, transition of care, orientation - poor return  Barriers to Learning: decreased cognition  Discharge Recommendations: Subacute/Skilled Nursing Facility;Continue to assess pending progress (vs. [facility])  REQUIRES OT FOLLOW UP: Yes  Activity Tolerance  Activity Tolerance: Treatment limited secondary to agitation  Safety Devices  Safety Devices in place: Yes  Type of devices: Call light within reach; Patient at risk for falls; Left in bed        Discharge Recommendations:  Subacute/Skilled Nursing Facility, Continue to assess pending progress (vs. [facility])   Discharge recommendations discussed with patient during initial evaluation. Plan   Plan  Times per week: 4-5x  Current Treatment Recommendations: Self-Care / ADL, Cognitive/Perceptual Training, Safety Education & Training, Cognitive Reorientation, Balance Training    G-Code  OT G-codes  Functional Assessment Tool Used: [place] AMPAC  Score: 12/24  Functional Limitation: Self care  Self Care Current Status (): At least 60 percent but less than 80 percent impaired, limited or restricted  Self Care Goal Status (): At least 40 percent but less than 60 percent impaired, limited or restricted  How much help from another person does the pt currently need? Unable A Lot A Little None   1. Putting on and taking off regular lower body clothing? 1      2      3       4   2. Bathing (including washing, rinsing, drying)? 1      2      3      4   3. Toileting, which includes using toilet, bedpan, or urinal?      1      2        3      4   4. Putting on and taking off regular upper body clothing? 1      2      3       4   5. Taking care of personal grooming such as brushing teeth?       1      2      3 4   6. Eating meals? 1      2       3       4     1. Unable = Total/Dependent Assist  2. A Lot = Maximum/Moderate Assist  3. A Little = Minimum/Contact Guard Assist/Supervision  4.  None= Modified [place]/Independent    Raw Score Scale Score Scale Score Standard Error Approximate Degree of Functional Impairment     6 17.07 3.74 100%   7 20.13 3.68 92%   8 22.86 3.43 86%   9 25.33 3.17 80%   10 27.31 2.96 75%   11 29.04 2.79 70%   12 30.60 2.68 67%   13 32.03 2.62 63%   14 33.39 2.61 60%   15 34.69 2.65 56%   16 35.96 2.71 53%   17 37.26 2.82 50%   18 38.66 2.97 47%   19 40.22 3.20 43%   20 42.03 3.55 38%   21 44.27 4.08 33%   22 47.10 4.81 26%   23 51.12 5.88 16%   24 57.54 7.36 0%       Goals  Short term goals  Time Frame for Short term goals: Pt will, before discharge  Short term goal 1: demo Ox3 with MIN VC  Short term goal 2: tolerate >10min seated ADL/IADL tasks with supervision and no LOB  Short term goal 3: demo UB and LB self-care with setup assist and supervision  Short term goal 4: demo functional mobility to/from bathroom with ae/ad and safety awareness with SBA       Therapy Time   Individual Concurrent Group Co-treatment   Time In 1320         Time Out 1346         Minutes Jarvis Fernandez OTR/L

## 2017-10-13 NOTE — PROGRESS NOTES
[address] [facility]    Progress Note    10/13/2017    10:24 AM    Name:   Bhavesh Boyd  MRN:     [id number]     Acct:      [de-identified]   Room:   [address] [place] [address] Day:  7  Admit Date:  10/6/2017 10:28 AM    PCP:   Leah Ontiveros MD  Code Status:  Full Code    Subjective:     C/C:   Chief Complaint   Patient presents with    Other     voice hoarse slow to respond     Interval History Status: not changed. Still not always making sense  Denies specific c/o    Brief History: This is a 59-year-old white male was admitted with intermittent confusion and speech problems.  He is found have acute renal failure in the emergency room and has been admitted to the hospital.  He's been treated with IV fluids and nephrology consultation.     He has a history of multiple myeloma and has been treated with chemotherapy, oncology evaluation Requested     In hospital for 7 days    Review of Systems:     Constitutional:  negative for chills, fevers, sweats  Respiratory:  negative for cough, dyspnea on exertion, shortness of breath, wheezing  Cardiovascular:  negative for chest pain, chest pressure/discomfort, lower extremity edema, palpitations  Gastrointestinal:  negative for abdominal pain, constipation, diarrhea, nausea, vomiting  Neurological:  negative for dizziness, headache    Medications:      Allergies:  No Known Allergies    Current Meds:   Scheduled Meds:    calcitonin  300 Units Intramuscular BID    polyethylene glycol  17 g Oral Daily    aspirin  81 mg Oral Daily    oxyCODONE  40 mg Oral Q12H    sodium chloride flush  10 mL Intravenous 2 times per day    famotidine  20 mg Oral Daily    acyclovir  400 mg Oral Daily     Continuous Infusions:    sodium chloride 125 mL/hr at 10/13/17 0504     PRN Meds: LORazepam, albuterol sulfate HFA, oxyCODONE, sodium chloride flush, acetaminophen, ondansetron, diphenhydrAMINE    Data:     Past Medical History:   has a past Donilillian Gonzalez in the last 72 hours. Lab Results   Component Value Date/Time    SPECIAL NOT REPORTED 02/28/2016 11:34 PM     Lab Results   Component Value Date/Time    CULTURE NO GROWTH 02/28/2016 11:34 PM    CULTURE  02/28/2016 11:34 PM     Performed at [facility], [address] [place] [address] [phone]       No results found for: POCPH, PHART, PH, POCPCO2, CJY8SQW, PCO2, POCPO2, PO2ART, PO2, POCHCO3, SRA8TSD, HCO3, NBEA, PBEA, BEART, BE, THGBART, THB, XQC9UWX, ZGKC2EXL, K6HFCCPX, O2SAT, FIO2    Radiology:    Nothing new  eeg c/w encephalopathy    Physical Examination:        General appearance:  alert, cooperative and no distress  Mental Status:  oriented to person, place and normal affect  Lungs:  clear to auscultation bilaterally, normal effort  Heart:  regular rate and rhythm, no murmur  Abdomen:  soft, nontender, nondistended, normal bowel sounds, no masses, hepatomegaly, splenomegaly  Extremities:  no edema, redness, tenderness in the calves  Skin:  no gross lesions, rashes, induration    Assessment:        Primary Problem  Acute kidney injury [facility])    C/Andreina Cheung 1106 Problems    Diagnosis Date Noted    Acute encephalopathy [G93.40] 10/09/2017     Priority: High    Hypercalcemia [E83.52] 10/10/2017    Acute kidney injury ([facility] [N17.9] 10/06/2017    Pancytopenia ([address] [D61.818] 10/06/2017    Multiple myeloma ([address] [C90.00] 10/06/2017       Plan:        1. Cont ivf  2.  Monitor calcium levels  Monitor renal fcn  D/w dr Gerhardt Shackle Blood, DO  10/13/2017  10:24 AM

## 2017-10-13 NOTE — PLAN OF CARE
Problem: Safety:  Goal: Free from accidental physical injury  Free from accidental physical injury   Outcome: Ongoing  Patient is a fall risk during this admission. Fall risk assessment was performed. Patient is absent of falls. Bed is in the lowest position. Wheels on the bed are locked. Call light and bed side table are within reach. Clutter is removed. Patient was educated to call out when needing assistance or wanting to get out of bed. Patient offered toileting assistance during rounding. Hourly rounds have been performed. Patient has a 1 on 1 sitter to help keep patient safe.

## 2017-10-13 NOTE — PROGRESS NOTES
Occupational Therapy  DATE: 10/13/2017    NAME: Gunjan Merritt  MRN: [id number]   : 1955  [facility]  Occupational Therapy Not Seen Note    Patient not available for Occupational Therapy due to:    [] Testing:    [] Hemodialysis    [] Cancelled by RN:    []Refusal by Patient:    [] Surgery:     [] Intubation:     [] Pain Medication:    [] Sedation:     [] Spine Precautions :    [] Medical Instability:    [x] Other: Pt sleeping with sitter bedside, sitter stated he was just very agitated and received pain meds, just fell asleep.  Will check back at later time/date  [address] [facility]

## 2017-10-13 NOTE — PROGRESS NOTES
Gunjan Merritt is a 57 y.o. male patient.     Current Facility-Administered Medications   Medication Dose Route Frequency Provider Last Rate Last Dose    calcitonin (MIACALCIN) injection 300 Units  300 Units Intramuscular BID Petr Corral MD   300 Units at 10/12/17 2135    LORazepam (ATIVAN) injection 0.5 mg  0.5 mg Intravenous Q6H PRN Peyman BAUMANN Blood, DO   0.5 mg at 10/11/17 1149    polyethylene glycol (GLYCOLAX) packet 17 g  17 g Oral Daily Johnnie Florentino MD   17 g at 10/12/17 0927    0.9 % sodium chloride infusion   Intravenous Continuous Petr Corral  mL/hr at 10/13/17 0504      aspirin chewable tablet 81 mg  81 mg Oral Daily Noé Phillipslop, DO   81 mg at 10/12/17 0929    albuterol sulfate  (90 Base) MCG/ACT inhaler 2 puff  2 puff Inhalation Q6H PRN Alverto Salas Orlop, DO        oxyCODONE (OXYCONTIN) extended release tablet 40 mg  40 mg Oral Q12H Noé Phillipslop, DO   40 mg at 10/12/17 2134    oxyCODONE (ROXICODONE) immediate release tablet 5 mg  5 mg Oral Q4H PRN Alverto Salas Orlop, DO   5 mg at 10/08/17 5242    sodium chloride flush 0.9 % injection 10 mL  10 mL Intravenous 2 times per day Maurice Antoine, DO   10 mL at 10/12/17 2135    sodium chloride flush 0.9 % injection 10 mL  10 mL Intravenous PRN Alverto Salas Orlop, DO        acetaminophen (TYLENOL) tablet 650 mg  650 mg Oral Q4H PRN Alverto Salas Orlop, DO   650 mg at 10/07/17 2217    ondansetron (ZOFRAN) injection 4 mg  4 mg Intravenous Q6H PRN Alverto Salas Orlop, DO        famotidine (PEPCID) tablet 20 mg  20 mg Oral Daily Alverto Maritza Orlop, DO   20 mg at 10/12/17 9963    acyclovir (ZOVIRAX) capsule 400 mg  400 mg Oral Daily Alverto Maritza Orlop, DO   400 mg at 10/12/17 5847    diphenhydrAMINE (BENADRYL) tablet 25 mg  25 mg Oral Nightly PRN Alverto Salas Orlop, DO   25 mg at 10/09/17 7205     No Known Allergies  Principal Problem:    Acute kidney injury ([address]  Active Problems:    Acute encephalopathy    Pancytopenia (HCC)    Multiple myeloma

## 2017-10-13 NOTE — PROGRESS NOTES
injection 400 Units BID   LORazepam (ATIVAN) injection 0.5 mg Q6H PRN   polyethylene glycol (GLYCOLAX) packet 17 g Daily   0.9 % sodium chloride infusion Continuous   aspirin chewable tablet 81 mg Daily   albuterol sulfate  (90 Base) MCG/ACT inhaler 2 puff Q6H PRN   oxyCODONE (OXYCONTIN) extended release tablet 40 mg Q12H   oxyCODONE (ROXICODONE) immediate release tablet 5 mg Q4H PRN   sodium chloride flush 0.9 % injection 10 mL 2 times per day   sodium chloride flush 0.9 % injection 10 mL PRN   acetaminophen (TYLENOL) tablet 650 mg Q4H PRN   ondansetron (ZOFRAN) injection 4 mg Q6H PRN   famotidine (PEPCID) tablet 20 mg Daily   acyclovir (ZOVIRAX) capsule 400 mg Daily   diphenhydrAMINE (BENADRYL) tablet 25 mg Nightly PRN         LABS      CBC: Recent Labs      10/11/17   0636  10/12/17   0645  10/13/17   0609   WBC  2.0*  2.1*  2.4*   RBC  2.81*  2.85*  3.12*   HGB  8.8*  8.9*  9.6*   HCT  26.2*  26.5*  28.9*   MCV  93.4  93.1  92.8   MCH  31.3  31.5  30.8   MCHC  33.5  33.8  33.2   RDW  13.7  14.9*  14.0   PLT  45*  49*  56*   MPV  NOT REPORTED  7.0  NOT REPORTED      BMP: Recent Labs      10/11/17   0636  10/12/17   0645  10/13/17   0609   NA  145*  144  148*   K  4.3  3.9  3.9   CL  109*  110*  112*   CO2  20  21  21   BUN  32*  31*  30*   CREATININE  2.96*  2.90*  2.81*   GLUCOSE  125*  136*  123*   CALCIUM  10.3  11.1*  11.2*      BNP:No results found for: BNP  PHOSPHORUS:    Recent Labs      10/11/17   0636   PHOS  3.5     MAGNESIUM:   Recent Labs      10/11/17   0636  10/12/17   0645  10/13/17   0609   MG  1.5*  2.0  1.8       SPEP: Lab Results   Component Value Date    PROT 6.4 10/06/2017    PATH ELECTRONICALLY SIGNED.  Danie Cisneros M.D. 10/09/2017     UPEP:   Lab Results   Component Value Date    TPU 95 10/09/2017    TPU 95 10/09/2017      URINE SODIUM:    Lab Results   Component Value Date    KECIA 51 10/06/2017      URINE CREATININE:  Lab Results   Component Value Date    LABCREA 52.9 10/07/2017 URINE PROTEIN:    Lab Results   Component Value Date    TPU 95 10/09/2017    TPU 95 10/09/2017     URINALYSIS:  U/A: Lab Results   Component Value Date    NITRU NEGATIVE 10/06/2017    COLORU YELLOW 10/06/2017    PHUR 5.5 10/06/2017    WBCUA 0 TO 2 10/06/2017    RBCUA 2 TO 5 10/06/2017    MUCUS 1+ 10/06/2017    TRICHOMONAS NOT REPORTED 10/06/2017    YEAST NOT REPORTED 10/06/2017    BACTERIA FEW 10/06/2017    SPECGRAV 1.020 10/06/2017    LEUKOCYTESUR NEGATIVE 10/06/2017    UROBILINOGEN Normal 10/06/2017    BILIRUBINUR NEGATIVE 10/06/2017    GLUCOSEU NEGATIVE 10/06/2017    KETUA NEGATIVE 10/06/2017    AMORPHOUS NOT REPORTED 10/06/2017       ASSESSMENT      1. SHEKHAR :Secondary to hypercalcemia and vasoconstriction. Serum creatinine gradually improving. Serum calcium level is still on the high side with the treatment as outlined above. In all likelihood he does have LCDD  2. Hypercalcemia : Secondary to underlying myeloma  3. Multiple myeloma being currently treated by hematology. 4.  Encephalopathy: Possibly secondary to hypercalcemia. Overall a little better    PLAN      1. Will change his fluids to half-normal saline at 125 an hour   2. Calcitonin ordered. He did receive Zometa yesterday. 3. Follow up labs ordered. 4. Following along       Please do not hesitate to call with questions.     Electronically signed by Babita Buchanan MD on 10/13/2017 at 12:56 PM

## 2017-10-13 NOTE — PLAN OF CARE
Problem: Falls - Risk of  Goal: Absence of falls  Pt fall risk, fall band present, falling star, safety alarm activated and in use as needed. Pt also on a 1:1. Hourly rounding performed. Pt encouraged to use call light. See Ned Mejia fall risk assessment.

## 2017-10-13 NOTE — PROGRESS NOTES
Physical Therapy  DATE: 10/13/2017    NAME: Giovany Almonte  MRN: [id number]   : 1955    Patient not seen this date for Physical Therapy due to:  [] Blood transfusion in progress  [] Cancel by RN  [] Hemodialysis  [x]  Refusal by Patient  (Patient is currently not agreeable for PT treatment, PCT who is one on one with patient reports did not sleep very much last night a was trying to nap)   [] Spine Precautions   [] Strict Bedrest  [] Surgery  [] Testing      [] Other        [] PT being discontinued at this time. Patient independent. No further needs. [] PT being discontinued at this time as the patient has been transferred to hospice care. No further needs.     Chantal Valdes, PTA

## 2017-10-13 NOTE — CONSULTS
[address] [facility] [address]               [address] [place] [address]                                 CONSULTATION    PATIENT NAME: Oscar Reyna                     :             1955  MED REC NO:   [id number]                           ROOM:           1009  ACCOUNT NO:   [de-identified]                         ADMISSION DATE:  10/06/2017  PROVIDER:     Alonso Ricci DATE:  10/12/2017    HISTORY OF PRESENT ILLNESS:  This patient is a 49-year-old male whom I  am asked to see in Neurology consultation for advice and opinion  regarding confusion and encephalopathy. The patient was admitted  because of management of his intermittent confusion. He has history  of multiple myeloma, he is undergoing active treatment. Over the last  couple of days, he has had episodes of confusions and slurred speech. Denied any headache, vision changes, numbness, tingling, weakness of  his face, arm, or leg. He denied any diplopia, dysarthria, or  dysphagia. He has had some loss of appetite. He has not been eating  and drinking well. He had some problem with word finding difficulty. He has acute renal failure. He denied any dysuria or polyuria. Denied any other associated symptoms or  modifying factors. PAST MEDICAL HISTORY:  Significant for multiple myeloma, thoracic or  lumbosacral neuritis. He denied any history of diabetes, heart  disease, stroke, or seizures. FAMILY HISTORY:  Noncontributory. REVIEW OF SYSTEMS:  He denied any chest pain, shortness of breath,  abdominal pain, hematochezia, hematuria, nosebleed, rash, or fever. PERSONAL AND SOCIAL HISTORY:  He has never smoked. He drinks alcohol,  does not use street drugs. MEDICATIONS:  His medications are as on the electronic health record  include Tylenol,  Zovirax, albuterol, aspirin, Benadryl, Pepcid,  Ativan, Zofran, oxycodone, _____. ALLERGIES:  He has no known drug allergies.     PHYSICAL EXAMINATION:  VITAL SIGNS:  His temperature is 97.7 degrees Fahrenheit, pulse 98,  respirations 16, and blood pressure 134/81. CHEST:  Clear. CARDIOVASCULAR:  Heart S1, S2.  NEUROLOGIC:  He is oriented to person, but not the place or time. His  speech is fluent and spontaneous. His skull is normocephalic and  atraumatic. His spine is unremarkable. His cranial nerves II through  XII are impaired with fundi not well visualized. On motor  examination, he has no focal weakness, atrophy, nor fasciculations. His sensory examination is intact to major modalities. On  coordination testing, his rapid alternating movements,  finger-nose-finger, heel-to-shin are slow without ataxia. His deep  tendon reflexes are trace and symmetric. ASSESSMENT:  The patient is a 70-year-old gentleman with multiple  myeloma. He has intermittent confusion, which may be metabolic. He  had an MRI scan of the brain done on 10/11, which showed no acute  intracranial abnormality with minimal global parenchymal volume loss. He has a hyperintense lesion within the right sphenoid bone  possibility related to his multiple myeloma. His laboratory studies  show multiple minor metabolic derangements that individually or  probably of no great consequence, but in aggregate certainly could be  an explanation for some of his confusion particularly as his BUN and  creatinine are very low or very high. I will get an EEG and follow  with you. I would anticipate that as his metabolic status improves,  his confusion should likewise follow. Please see order for details of  these plans. We will follow with you. Thank you for the consultation.       ROSIE CARTWRIGHT    D: 10/12/2017 8:58:42       T: 10/12/2017 10:49:58     [email or web address]  Job#: [id number]     Doc#: [id number]

## 2017-10-13 NOTE — PROGRESS NOTES
18 (L) >60 mL/min    GFR  22 (L) >60 mL/min    GFR Comment          GFR Staging NOT REPORTED    Urinalysis with microscopic   Result Value Ref Range    Color, UA YELLOW YEL    Turbidity UA CLEAR CLEAR    Glucose, Ur NEGATIVE NEG    Bilirubin Urine NEGATIVE NEG    Ketones, Urine NEGATIVE NEG    Specific Gravity, UA 1.020 1.005 - 1.030    Urine Hgb 1+ (A) NEG    pH, UA 5.5 5.0 - 8.0    Protein, UA 1+ (A) NEG    Urobilinogen, Urine Normal NORM    Nitrite, Urine NEGATIVE NEG    Leukocyte Esterase, Urine NEGATIVE NEG    Urinalysis Comments NOT REPORTED     -          WBC, UA 0 TO 2 0 - 5 /HPF    RBC, UA 2 TO 5 0 - 2 /HPF    Casts UA NOT REPORTED /LPF    Crystals UA NOT REPORTED NONE /HPF    Epithelial Cells UA 2 TO 5 /HPF    Renal Epithelial, Urine NOT REPORTED 0 /HPF    Bacteria, UA FEW (A) NONE    Mucus, UA 1+ (A) NONE    Trichomonas, UA NOT REPORTED NONE    Amorphous, UA NOT REPORTED NONE    Other Observations UA NOT REPORTED NREQ    Yeast, UA NOT REPORTED NONE   Creatinine, urine, random   Result Value Ref Range    Creatinine, Ur 51.0 39.0 - 259.0 mg/dL   Sodium, urine, random   Result Value Ref Range    Sodium,Ur 51 mmol/L   Basic metabolic panel   Result Value Ref Range    Glucose 105 (H) 70 - 99 mg/dL    BUN 30 (H) 8 - 23 mg/dL    CREATININE 3.47 (H) 0.70 - 1.20 mg/dL    Bun/Cre Ratio 9 9 - 20    Calcium 10.2 8.6 - 10.4 mg/dL    Sodium 137 135 - 144 mmol/L    Potassium 5.1 3.7 - 5.3 mmol/L    Chloride 103 98 - 107 mmol/L    CO2 25 20 - 31 mmol/L    Anion Gap 9 9 - 17 mmol/L    GFR Non-African American 18 (L) >60 mL/min    GFR  22 (L) >60 mL/min    GFR Comment          GFR Staging NOT REPORTED    CBC   Result Value Ref Range    WBC 1.2 (LL) 3.5 - 11.0 k/uL    RBC 2.64 (L) 4.5 - 5.9 m/uL    Hemoglobin 8.2 (L) 13.5 - 17.5 g/dL    Hematocrit 24.6 (L) 41 - 53 %    MCV 93.1 80 - 100 fL    MCH 31.2 26 - 34 pg    MCHC 33.6 31 - 37 g/dL    RDW 14.0 11.5 - 14.5 %    Platelets 43 (L) 816 - 400 k/uL    MPV NOT REPORTED 6.0 - 12.0 fL   Magnesium   Result Value Ref Range    Magnesium 1.9 1.6 - 2.6 mg/dL   Phosphorus   Result Value Ref Range    Phosphorus 5.5 (H) 2.5 - 4.5 mg/dL   Basic Metabolic Panel   Result Value Ref Range    Glucose 97 70 - 99 mg/dL    BUN 31 (H) 8 - 23 mg/dL    CREATININE 3.90 (H) 0.70 - 1.20 mg/dL    Bun/Cre Ratio 8 (L) 9 - 20    Calcium 11.0 (H) 8.6 - 10.4 mg/dL    Sodium 141 135 - 144 mmol/L    Potassium 5.3 3.7 - 5.3 mmol/L    Chloride 106 98 - 107 mmol/L    CO2 23 20 - 31 mmol/L    Anion Gap 12 9 - 17 mmol/L    GFR Non-African American 16 (L) >60 mL/min    GFR  19 (L) >60 mL/min    GFR Comment          GFR Staging NOT REPORTED    CBC Auto Differential   Result Value Ref Range    WBC 1.2 (LL) 3.5 - 11.0 k/uL    RBC 2.64 (L) 4.5 - 5.9 m/uL    Hemoglobin 8.5 (L) 13.5 - 17.5 g/dL    Hematocrit 24.9 (L) 41 - 53 %    MCV 94.5 80 - 100 fL    MCH 32.3 26 - 34 pg    MCHC 34.1 31 - 37 g/dL    RDW 15.3 (H) 11.5 - 14.5 %    Platelets 43 (L) 330 - 400 k/uL    MPV 7.6 6.0 - 12.0 fL    Differential Type NOT REPORTED     WBC Morphology NOT REPORTED     RBC Morphology NOT REPORTED     Platelet Estimate NOT REPORTED     Seg Neutrophils 51 %    Lymphocytes 36 %    Monocytes 9 %    Eosinophils % 1 %    Basophils 1 %    Metamyelocytes 1 (H) 0 %    Myelocytes 1 (H) 0 %    Segs Absolute 0.62 (L) 1.8 - 7.7 k/uL    Absolute Lymph # 0.43 (L) 1.0 - 4.8 k/uL    Absolute Mono # 0.11 (L) 0.2 - 0.8 k/uL    Absolute Eos # 0.01 0.0 - 0.4 k/uL    Basophils # 0.01 0.0 - 0.2 k/uL    Metamyelocytes Absolute 0.01 (H) 0 k/uL    Myelocytes Absolute 0.01 (H) 0 k/uL    Morphology INCREASED BANDS PRESENT    Basic Metabolic Panel   Result Value Ref Range    Glucose 98 70 - 99 mg/dL    BUN 30 (H) 8 - 23 mg/dL    CREATININE 3.63 (H) 0.70 - 1.20 mg/dL    Bun/Cre Ratio 8 (L) 9 - 20    Calcium 10.7 (H) 8.6 - 10.4 mg/dL    Sodium 138 135 - 144 mmol/L    Potassium 4.9 3.7 - 5.3 mmol/L    Chloride 104 98 - 107 mmol/L Platelets 46 (L) 137 - 400 k/uL    MPV NOT REPORTED 6.0 - 12.0 fL    Differential Type NOT REPORTED     WBC Morphology NOT REPORTED     RBC Morphology NOT REPORTED     Platelet Estimate NOT REPORTED     Seg Neutrophils 52 %    Lymphocytes 27 %    Monocytes 13 %    Eosinophils % 4 %    Basophils 0 %    Metamyelocytes 2 (H) 0 %    Myelocytes 2 (H) 0 %    Segs Absolute 0.89 (L) 1.8 - 7.7 k/uL    Absolute Lymph # 0.46 (L) 1.0 - 4.8 k/uL    Absolute Mono # 0.22 0.2 - 0.8 k/uL    Absolute Eos # 0.07 0.0 - 0.4 k/uL    Basophils # 0.00 0.0 - 0.2 k/uL    Metamyelocytes Absolute 0.03 (H) 0 k/uL    Myelocytes Absolute 0.03 (H) 0 k/uL    Morphology INCREASED BANDS PRESENT    Vitamin B12 & Folate   Result Value Ref Range    Vitamin B-12 965 (H) 211 - 946 pg/mL    Folate 13.5 >4.8 ng/mL   IMMUNOFIXATION URINE RANDOM PROFILE   Result Value Ref Range    Urine IFX Specimen . CLEAN CATCH URINE     Volume . CLEAN CATCH URINE mL    Urine Total Protein 95 mg/dL    Urine IFX Interp       FREE MONOCLONAL LIGHT CHAINS ARE PRESENT, IDENTIFIED AS   Lely/Lambda Free Lt Chains, Serum Quant   Result Value Ref Range    Kappa Free Light Chains .08 (H) 0.37 - 1.94 mg/dL    Lambda Free Light Chains QNT 0.89 0.57 - 2.63 mg/dL    Free Kappa/Lambda Ratio 488.85 (H) 0.26 - 1.65   Magnesium   Result Value Ref Range    Magnesium 1.6 1.6 - 2.6 mg/dL   Phosphorus   Result Value Ref Range    Phosphorus 4.5 2.5 - 4.5 mg/dL   Basic Metabolic Panel   Result Value Ref Range    Glucose 126 (H) 70 - 99 mg/dL    BUN 32 (H) 8 - 23 mg/dL    CREATININE 3.42 (H) 0.70 - 1.20 mg/dL    Bun/Cre Ratio 9 9 - 20    Calcium 10.9 (H) 8.6 - 10.4 mg/dL    Sodium 145 (H) 135 - 144 mmol/L    Potassium 4.6 3.7 - 5.3 mmol/L    Chloride 108 (H) 98 - 107 mmol/L    CO2 22 20 - 31 mmol/L    Anion Gap 15 9 - 17 mmol/L    GFR Non-African American 18 (L) >60 mL/min    GFR  22 (L) >60 mL/min    GFR Comment          GFR Staging NOT REPORTED    CBC Auto Differential Basic Metabolic Panel   Result Value Ref Range    Glucose 123 (H) 70 - 99 mg/dL    BUN 30 (H) 8 - 23 mg/dL    CREATININE 2.81 (H) 0.70 - 1.20 mg/dL    Bun/Cre Ratio 11 9 - 20    Calcium 11.2 (H) 8.6 - 10.4 mg/dL    Sodium 148 (H) 135 - 144 mmol/L    Potassium 3.9 3.7 - 5.3 mmol/L    Chloride 112 (H) 98 - 107 mmol/L    CO2 21 20 - 31 mmol/L    Anion Gap 15 9 - 17 mmol/L    GFR Non-African American 23 (L) >60 mL/min    GFR  28 (L) >60 mL/min    GFR Comment          GFR Staging NOT REPORTED    CBC Auto Differential   Result Value Ref Range    WBC 2.4 (L) 3.5 - 11.0 k/uL    RBC 3.12 (L) 4.5 - 5.9 m/uL    Hemoglobin 9.6 (L) 13.5 - 17.5 g/dL    Hematocrit 28.9 (L) 41 - 53 %    MCV 92.8 80 - 100 fL    MCH 30.8 26 - 34 pg    MCHC 33.2 31 - 37 g/dL    RDW 14.0 11.5 - 14.5 %    Platelets 56 (L) 629 - 400 k/uL    MPV NOT REPORTED 6.0 - 12.0 fL    Differential Type NOT REPORTED     WBC Morphology NOT REPORTED     RBC Morphology NOT REPORTED     Platelet Estimate NOT REPORTED     Seg Neutrophils 40 %    Lymphocytes 22 %    Monocytes 30 %    Eosinophils % 8 %    Basophils 0 %    nRBC 2 (H) 0 per 100 WBC    Segs Absolute 0.96 (L) 1.8 - 7.7 k/uL    Absolute Lymph # 0.53 (L) 1.0 - 4.8 k/uL    Absolute Mono # 0.72 0.2 - 0.8 k/uL    Absolute Eos # 0.19 0.0 - 0.4 k/uL    Basophils # 0.00 0.0 - 0.2 k/uL    Morphology ANISOCYTOSIS PRESENT    Calcium, Ionized   Result Value Ref Range    Calcium, Ion 1.53 (H) 1.13 - 1.33 mmol/L   EKG 12 Lead   Result Value Ref Range    Ventricular Rate 95 BPM    Atrial Rate 95 BPM    P-R Interval 142 ms    QRS Duration 128 ms    Q-T Interval 352 ms    QTc Calculation (Bazett) 442 ms    P Axis 39 degrees    R Axis -27 degrees    T Axis 38 degrees     Xr Chest Standard (2 Vw)    Result Date: 10/6/2017  EXAMINATION: TWO VIEWS OF THE CHEST 10/6/2017 11:26 am COMPARISON: Chest x-ray from 02/28/2016 HISTORY: ORDERING SYSTEM PROVIDED HISTORY: [facility] TECHNOLOGIST PROVIDED HISTORY: Reason for acute abnormality. SINUSES: There is scattered mild mucosal thickening in the paranasal sinuses. There is a retention cyst versus polyp in the right frontal sinus. The bilateral mastoid air cells are clear. SOFT TISSUES/SKULL:  There is a 1.5 cm lytic lesion in the right forehead calvarium, stable in size with mildly increased peripheral sclerosis, nonspecific, may be related to chronic sinusitis versus lesions related to history of multiple myeloma. No acute intracranial abnormality. Mild chronic microvascular disease. 1.5 cm lytic lesion in the right forehead calvarium, stable in size with mildly increased peripheral sclerosis, nonspecific, may be related to chronic sinusitis versus lesion related to history of multiple myeloma. Us Renal Complete    Result Date: 10/6/2017  EXAMINATION: RETROPERITONEAL ULTRASOUND OF THE KIDNEYS AND URINARY BLADDER 10/6/2017 COMPARISON: None HISTORY: ORDERING SYSTEM PROVIDED HISTORY: acute renal failure 58-year-old male with acute renal failure and painful urination for 1 month FINDINGS: Kidneys: Right kidney measures 12.9 by 5.1 x 5.3 cm in greatest longitudinal, AP, and transverse dimensions for the total right renal volume of 180.6 mL. Right renal cortical thickness measures up to 1.6 cm. Left kidney measures 11.1 by 6.6 x 4.9 cm in greatest longitudinal, AP, and transverse dimensions for the total left renal volume of 188.1 mL. Left renal cortical thickness measures up to 2.1 cm. Bilateral corticomedullary differentiation is well-maintained. Color flow projects over the bilateral renal parenchyma. No hydronephrosis. No perinephric fluid. Bladder: Prevoid urinary bladder measures 10.5 x 8.5 x 8.9 cm in greatest longitudinal, AP, and transverse dimensions for the total urinary bladder volume of 521.8 mL.  Postvoid urinary bladder measures 3.7 x 6.4 x 6.4 cm for a total postvoid urinary bladder volume of 79.5 mL.     1. Unremarkable renal ultrasound without hydronephrosis. 2. Moderate postvoid residual of the urinary bladder. Problem Lists:   Primary Problem:  Acute kidney injury [facility])   Current Problems:  Active Hospital Problems    Diagnosis Date Noted    Acute encephalopathy [G93.40] 10/09/2017     Priority: High    Hypercalcemia [E83.52] 10/10/2017    Acute kidney injury ([address] [N17.9] 10/06/2017    Pancytopenia ([address] [D61.818] 10/06/2017    Multiple myeloma ([address] [C90.00] 10/06/2017     PMH:  Past Medical History:   Diagnosis Date    Multiple myeloma ([facility]     Thoracic or lumbosacral neuritis or radiculitis, unspecified 4/26/2013      Allergies: No Known Allergies       CT PET   9/20/17    <HTML><META HTTP-EQUIV=\"content-type\" CONTENT=\"text/html;charset=utf-8\"><BR>IMPRESSION: <BR>1. Very prominent focal area of high glucose uptake identified within the sternum adjacent to left side of the sternal clavicular head measuring  <BR>approximate 2.5 cm in size likely presents a new area of metastatic deposition. Additional areas described above with increased glucose utilization  <BR>within the axial skeleton may represent minimal change compared to prior exam with superimposed chemotherapeutic effect and increased activity  <BR>diffusely involving the skeleton and marrow. <BR>2. There is no evidence for neck lymphadenopathy, pelvis or abdomen intraperitoneal adenopathy or metastatic deposition within the liver. <BR>3.  formerly reported small focus of abnormal tracer uptake in the left radius near the left elbow less conspicuous today.     Xr Chest Standard (2 Vw)              Assessment      Chief Complaint   Patient presents with    Other     voice hoarse slow to respond     Patient Active Problem List   Diagnosis    Psychiatric diagnosis    Thoracic or lumbosacral neuritis or radiculitis, unspecified    Cancer ([facility]    Hyponatremia    FUO (fever of unknown origin)    Bronchitis    Acute encephalopathy    Thrombocytopenia (HCC)    Acute kidney injury ([address]    Pancytopenia ([address]    Multiple myeloma ([address]    Hypercalcemia     Plan     Patient has progressive multiple myeloma. Pamolyst d/milo   Plans to treat with carfilzumib and panobinostat as outpatient once patient recovers. Patient received Zometa yesterday. Also getting calcitonin. Altered mental status possibly due to hypercalcemia. Neurology on board. EEG noted to be abnormal.          Emilia Billy        This note is created with the assistance of a speech recognition program.  While intending to generate a document that actually reflects the content of the visit, the document can still have some errors including those of syntax and sound a like substitutions which may escape proof reading. It such instances, actual meaning can be extrapolated by contextual diversion.

## 2017-10-14 NOTE — PROGRESS NOTES
[facility]    Progress Note    10/14/2017    12:05 PM    Name:   Hannah Shields  MRN:     [id number]     Acct:      [de-identified]   Room:   [address] [place] [address] Day:  8  Admit Date:  10/6/2017 10:28 AM    PCP:   Augusto Norman MD  Code Status:  Full Code    Subjective:     C/C:   Chief Complaint   Patient presents with    Other     voice hoarse slow to respond     Interval History Status: not changed. Still confused at times-many things he says don't make sense    Brief History: This is a 57-year-old white male was admitted with intermittent confusion and speech problems.  He is found have acute renal failure in the emergency room and has been admitted to the hospital.  He's been treated with IV fluids and nephrology consultation.     He has a history of multiple myeloma and has been treated with chemotherapy, oncology evaluation Requested     In hospital for 8 days    Review of Systems:     Constitutional:  negative for chills, fevers, sweats  Respiratory:  negative for cough, dyspnea on exertion, shortness of breath, wheezing  Cardiovascular:  negative for chest pain, chest pressure/discomfort, lower extremity edema, palpitations  Gastrointestinal:  negative for abdominal pain, constipation, diarrhea, nausea, vomiting  Neurological:  negative for dizziness, headache    Medications:      Allergies:  No Known Allergies    Current Meds:   Scheduled Meds:    polyethylene glycol  17 g Oral Daily    aspirin  81 mg Oral Daily    oxyCODONE  40 mg Oral Q12H    sodium chloride flush  10 mL Intravenous 2 times per day    famotidine  20 mg Oral Daily    acyclovir  400 mg Oral Daily     Continuous Infusions:    sodium chloride 125 mL/hr at 10/14/17 0636     PRN Meds: LORazepam, albuterol sulfate HFA, oxyCODONE, sodium chloride flush, acetaminophen, ondansetron, diphenhydrAMINE    Data:     Past Medical History:   has a past medical history of Multiple myeloma ([address] and Thoracic or lumbosacral neuritis or radiculitis, unspecified. Social History:   reports that he has never smoked. He has quit using smokeless tobacco. He reports that he drinks alcohol. He reports that he does not use drugs. Family History:   Family History   Problem Relation Age of Onset    Other Father      \"heart issues\"   Benja Martinez Cancer Mother      lung       Vitals:  BP (!) 140/64   Pulse 112   Temp 97.7 °F (36.5 °C) (Oral)   Resp 16   Ht 5' 10\" (1.778 m)   Wt 193 lb 12.8 oz (87.9 kg)   SpO2 98%   BMI 27.81 kg/m²   Temp (24hrs), Av °F (36.1 °C), Min:92.8 °F (33.8 °C), Max:98.4 °F (36.9 °C)    No results for input(s): POCGLU in the last 72 hours. I/O (24Hr):     Intake/Output Summary (Last 24 hours) at 10/14/17 1205  Last data filed at 10/14/17 1133   Gross per 24 hour   Intake          3286.75 ml   Output             4700 ml   Net         -1413.25 ml       Labs:    Hematology:  Recent Labs      10/12/17   0645  10/13/17   0609  10/14/17   0805   WBC  2.1*  2.4*  2.3*   RBC  2.85*  3.12*  3.16*   HGB  8.9*  9.6*  10.4*   HCT  26.5*  28.9*  29.3*   MCV  93.1  92.8  92.7   MCH  31.5  30.8  32.9   MCHC  33.8  33.2  35.5   RDW  14.9*  14.0  14.2   PLT  49*  56*  53*   MPV  7.0  NOT REPORTED  NOT REPORTED     Chemistry:  Recent Labs      10/12/17   0645  10/13/17   0609  10/14/17   0805   NA  144  148*  148*   K  3.9  3.9  4.3   CL  110*  112*  113*   CO2  21  21  22   GLUCOSE  136*  123*  115*   BUN  31*  30*  30*   CREATININE  2.90*  2.81*  2.78*   MG  2.0  1.8  1.9   ANIONGAP  13  15  13   LABGLOM  22*  23*  23*   GFRAA  27*  28*  28*   CALCIUM  11.1*  11.2*  10.4   CAION   --   1.53*   --      Recent Labs      10/13/17   1626   PROT  5.7*         Lab Results   Component Value Date/Time    SPECIAL NOT REPORTED 2016 11:34 PM     Lab Results   Component Value Date/Time    CULTURE NO GROWTH 2016 11:34 PM    CULTURE  2016 11:34 PM     Performed at [facility] [address] [address] [phone]       No results found for: POCPH, PHART, PH, POCPCO2, ZUN9HLB, PCO2, POCPO2, PO2ART, PO2, POCHCO3, MNI3UQP, HCO3, NBEA, PBEA, BEART, BE, THGBART, THB, BHE6HVQ, VKBZ5KCJ, Q3LPUEBV, O2SAT, FIO2    Radiology:    Nothing new    Physical Examination:        General appearance:  alert, cooperative and no distress  Mental Status:  oriented to person, place and time and normal affect, though still doesn't make sense  Lungs:  clear to auscultation bilaterally, normal effort  Heart:  regular rate and rhythm, no murmur  Abdomen:  soft, nontender, nondistended, normal bowel sounds, no masses, hepatomegaly, splenomegaly  Extremities:  no edema, redness, tenderness in the calves  Skin:  no gross lesions, rashes, induration    Assessment:        Primary Problem  Acute kidney injury [facility])    Active Hospital Problems    Diagnosis Date Noted    Acute encephalopathy [G93.40] 10/09/2017     Priority: High    Hypercalcemia [E83.52] 10/10/2017    Acute kidney injury ([address] [N17.9] 10/06/2017    Pancytopenia ([address] [D61.818] 10/06/2017    Multiple myeloma ([address] [C90.00] 10/06/2017       Plan:        1. Cont to monitor mental status  2. D/w nephew at bedside  3.  Still requiring sitter for safety    [facility] Blood, DO  10/14/2017  12:05 PM

## 2017-10-14 NOTE — PROGRESS NOTES
Date:                           10/14/2017  Patient name:           Patricia Reynolds  Date of admission:  10/6/2017 10:28 AM  MRN:   [id number]  YOB: 1955  PCP:                           Diane Ignacio MD      Subjective:       Patient seen and examined. Patient confused and not able to give history. Calcium is coming down     REVIEW OF SYSTEMS:    unable to obtain reliable review of systems due to patient being very lethargic likely from Ativan and hypercalcemia      Objective:     Vitals: BP (!) 140/64   Pulse 112   Temp 97.7 °F (36.5 °C) (Oral)   Resp 16   Ht 5' 10\" (1.778 m)   Wt 179 lb 1.6 oz (81.2 kg)   SpO2 98%   BMI 25.70 kg/m²   General appearance - Not in pain or distress  Mental status - mildly confused,   Eyes - pupils equal and reactive, extraocular eye movements intact  Ears - bilateral TM's and external ear canals normal  Nose - normal and patent, no erythema, discharge or polyps  Mouth - mucous membranes moist, pharynx normal without lesions. Hoarseness of voice. Neck - supple, no significant adenopathy  Lymphatics - no palpable lymphadenopathy, no hepatosplenomegaly  Chest - clear to auscultation, no wheezes, rales or rhonchi, symmetric air entry  Heart - normal rate, regular rhythm, normal S1, S2, no murmurs, rubs, clicks or gallops  Abdomen - soft, nontender, nondistended, no masses or organomegaly  Neurological - Confused  Musculoskeletal - no joint tenderness, deformity or swelling  Extremities - peripheral pulses normal, no pedal edema, no clubbing or cyanosis  Skin - normal coloration and turgor, no rashes, no suspicious skin lesions noted           Data:    No intake/output data recorded.   In: 1466.8 [I.V.:1466.8]  Out: 3300 [Urine:3300]    CBC:   Recent Labs      10/12/17   0645  10/13/17   0609  10/14/17   0805   WBC  2.1*  2.4*  2.3*   HGB  8.9*  9.6*  10.4*   PLT  49*  56*  53*     BMP:    Recent Labs      10/12/17   0645  10/13/17   8071 REPORTED 1.0 - 2.5    GFR Non- 18 (L) >60 mL/min    GFR  22 (L) >60 mL/min    GFR Comment          GFR Staging NOT REPORTED    Urinalysis with microscopic   Result Value Ref Range    Color, UA YELLOW YEL    Turbidity UA CLEAR CLEAR    Glucose, Ur NEGATIVE NEG    Bilirubin Urine NEGATIVE NEG    Ketones, Urine NEGATIVE NEG    Specific Gravity, UA 1.020 1.005 - 1.030    Urine Hgb 1+ (A) NEG    pH, UA 5.5 5.0 - 8.0    Protein, UA 1+ (A) NEG    Urobilinogen, Urine Normal NORM    Nitrite, Urine NEGATIVE NEG    Leukocyte Esterase, Urine NEGATIVE NEG    Urinalysis Comments NOT REPORTED     -          WBC, UA 0 TO 2 0 - 5 /HPF    RBC, UA 2 TO 5 0 - 2 /HPF    Casts UA NOT REPORTED /LPF    Crystals UA NOT REPORTED NONE /HPF    Epithelial Cells UA 2 TO 5 /HPF    Renal Epithelial, Urine NOT REPORTED 0 /HPF    Bacteria, UA FEW (A) NONE    Mucus, UA 1+ (A) NONE    Trichomonas, UA NOT REPORTED NONE    Amorphous, UA NOT REPORTED NONE    Other Observations UA NOT REPORTED NREQ    Yeast, UA NOT REPORTED NONE   Creatinine, urine, random   Result Value Ref Range    Creatinine, Ur 51.0 39.0 - 259.0 mg/dL   Sodium, urine, random   Result Value Ref Range    Sodium,Ur 51 mmol/L   Basic metabolic panel   Result Value Ref Range    Glucose 105 (H) 70 - 99 mg/dL    BUN 30 (H) 8 - 23 mg/dL    CREATININE 3.47 (H) 0.70 - 1.20 mg/dL    Bun/Cre Ratio 9 9 - 20    Calcium 10.2 8.6 - 10.4 mg/dL    Sodium 137 135 - 144 mmol/L    Potassium 5.1 3.7 - 5.3 mmol/L    Chloride 103 98 - 107 mmol/L    CO2 25 20 - 31 mmol/L    Anion Gap 9 9 - 17 mmol/L    GFR Non-African American 18 (L) >60 mL/min    GFR  22 (L) >60 mL/min    GFR Comment          GFR Staging NOT REPORTED    CBC   Result Value Ref Range    WBC 1.2 (LL) 3.5 - 11.0 k/uL    RBC 2.64 (L) 4.5 - 5.9 m/uL    Hemoglobin 8.2 (L) 13.5 - 17.5 g/dL    Hematocrit 24.6 (L) 41 - 53 %    MCV 93.1 80 - 100 fL    MCH 31.2 26 - 34 pg    MCHC 33.6 31 - 37 g/dL    RDW MCHC 33.6 31 - 37 g/dL    RDW 14.2 11.5 - 14.5 %    Platelets 46 (L) 557 - 400 k/uL    MPV NOT REPORTED 6.0 - 12.0 fL    Differential Type NOT REPORTED     WBC Morphology NOT REPORTED     RBC Morphology NOT REPORTED     Platelet Estimate NOT REPORTED     Seg Neutrophils 52 %    Lymphocytes 27 %    Monocytes 13 %    Eosinophils % 4 %    Basophils 0 %    Metamyelocytes 2 (H) 0 %    Myelocytes 2 (H) 0 %    Segs Absolute 0.89 (L) 1.8 - 7.7 k/uL    Absolute Lymph # 0.46 (L) 1.0 - 4.8 k/uL    Absolute Mono # 0.22 0.2 - 0.8 k/uL    Absolute Eos # 0.07 0.0 - 0.4 k/uL    Basophils # 0.00 0.0 - 0.2 k/uL    Metamyelocytes Absolute 0.03 (H) 0 k/uL    Myelocytes Absolute 0.03 (H) 0 k/uL    Morphology INCREASED BANDS PRESENT    Vitamin B12 & Folate   Result Value Ref Range    Vitamin B-12 965 (H) 211 - 946 pg/mL    Folate 13.5 >4.8 ng/mL   IMMUNOFIXATION URINE RANDOM PROFILE   Result Value Ref Range    Urine IFX Specimen . CLEAN CATCH URINE     Volume . CLEAN CATCH URINE mL    Urine Total Protein 95 mg/dL    Urine IFX Interp       FREE MONOCLONAL LIGHT CHAINS ARE PRESENT, IDENTIFIED AS   [place]/Lambda Free Lt Chains, Serum Quant   Result Value Ref Range    Kappa Free Light Chains .08 (H) 0.37 - 1.94 mg/dL    Lambda Free Light Chains QNT 0.89 0.57 - 2.63 mg/dL    Free Kappa/Lambda Ratio 488.85 (H) 0.26 - 1.65   Magnesium   Result Value Ref Range    Magnesium 1.6 1.6 - 2.6 mg/dL   Phosphorus   Result Value Ref Range    Phosphorus 4.5 2.5 - 4.5 mg/dL   Basic Metabolic Panel   Result Value Ref Range    Glucose 126 (H) 70 - 99 mg/dL    BUN 32 (H) 8 - 23 mg/dL    CREATININE 3.42 (H) 0.70 - 1.20 mg/dL    Bun/Cre Ratio 9 9 - 20    Calcium 10.9 (H) 8.6 - 10.4 mg/dL    Sodium 145 (H) 135 - 144 mmol/L    Potassium 4.6 3.7 - 5.3 mmol/L    Chloride 108 (H) 98 - 107 mmol/L    CO2 22 20 - 31 mmol/L    Anion Gap 15 9 - 17 mmol/L    GFR Non-African American 18 (L) >60 mL/min    GFR  22 (L) >60 mL/min    GFR Comment 31.3 26 - 34 pg    MCHC 33.5 31 - 37 g/dL    RDW 13.7 11.5 - 14.5 %    Platelets 45 (L) 282 - 400 k/uL    MPV NOT REPORTED 6.0 - 12.0 fL    Differential Type NOT REPORTED     WBC Morphology NOT REPORTED     RBC Morphology NOT REPORTED     Platelet Estimate NOT REPORTED     Seg Neutrophils 68 %    Lymphocytes 15 %    Monocytes 14 %    Eosinophils % 2 %    Basophils 1 %    Segs Absolute 1.36 (L) 1.8 - 7.7 k/uL    Absolute Lymph # 0.30 (L) 1.0 - 4.8 k/uL    Absolute Mono # 0.28 0.2 - 0.8 k/uL    Absolute Eos # 0.04 0.0 - 0.4 k/uL    Basophils # 0.02 0.0 - 0.2 k/uL   Magnesium   Result Value Ref Range    Magnesium 2.0 1.6 - 2.6 mg/dL   Basic Metabolic Panel   Result Value Ref Range    Glucose 136 (H) 70 - 99 mg/dL    BUN 31 (H) 8 - 23 mg/dL    CREATININE 2.90 (H) 0.70 - 1.20 mg/dL    Bun/Cre Ratio 11 9 - 20    Calcium 11.1 (H) 8.6 - 10.4 mg/dL    Sodium 144 135 - 144 mmol/L    Potassium 3.9 3.7 - 5.3 mmol/L    Chloride 110 (H) 98 - 107 mmol/L    CO2 21 20 - 31 mmol/L    Anion Gap 13 9 - 17 mmol/L    GFR Non-African American 22 (L) >60 mL/min    GFR  27 (L) >60 mL/min    GFR Comment          GFR Staging NOT REPORTED    CBC Auto Differential   Result Value Ref Range    WBC 2.1 (L) 3.5 - 11.0 k/uL    RBC 2.85 (L) 4.5 - 5.9 m/uL    Hemoglobin 8.9 (L) 13.5 - 17.5 g/dL    Hematocrit 26.5 (L) 41 - 53 %    MCV 93.1 80 - 100 fL    MCH 31.5 26 - 34 pg    MCHC 33.8 31 - 37 g/dL    RDW 14.9 (H) 11.5 - 14.5 %    Platelets 49 (L) 956 - 400 k/uL    MPV 7.0 6.0 - 12.0 fL    Differential Type NOT REPORTED     WBC Morphology NOT REPORTED     RBC Morphology NOT REPORTED     Platelet Estimate NOT REPORTED     Seg Neutrophils 55 %    Lymphocytes 17 %    Monocytes 24 %    Eosinophils % 3 %    Basophils 1 %    Segs Absolute 1.16 (L) 1.8 - 7.7 k/uL    Absolute Lymph # 0.36 (L) 1.0 - 4.8 k/uL    Absolute Mono # 0.50 0.2 - 0.8 k/uL    Absolute Eos # 0.06 0.0 - 0.4 k/uL    Basophils # 0.02 0.0 - 0.2 k/uL   Magnesium   Result Value Ref Range    Magnesium 1.8 1.6 - 2.6 mg/dL   Basic Metabolic Panel   Result Value Ref Range    Glucose 123 (H) 70 - 99 mg/dL    BUN 30 (H) 8 - 23 mg/dL    CREATININE 2.81 (H) 0.70 - 1.20 mg/dL    Bun/Cre Ratio 11 9 - 20    Calcium 11.2 (H) 8.6 - 10.4 mg/dL    Sodium 148 (H) 135 - 144 mmol/L    Potassium 3.9 3.7 - 5.3 mmol/L    Chloride 112 (H) 98 - 107 mmol/L    CO2 21 20 - 31 mmol/L    Anion Gap 15 9 - 17 mmol/L    GFR Non-African American 23 (L) >60 mL/min    GFR  28 (L) >60 mL/min    GFR Comment          GFR Staging NOT REPORTED    CBC Auto Differential   Result Value Ref Range    WBC 2.4 (L) 3.5 - 11.0 k/uL    RBC 3.12 (L) 4.5 - 5.9 m/uL    Hemoglobin 9.6 (L) 13.5 - 17.5 g/dL    Hematocrit 28.9 (L) 41 - 53 %    MCV 92.8 80 - 100 fL    MCH 30.8 26 - 34 pg    MCHC 33.2 31 - 37 g/dL    RDW 14.0 11.5 - 14.5 %    Platelets 56 (L) 261 - 400 k/uL    MPV NOT REPORTED 6.0 - 12.0 fL    Differential Type NOT REPORTED     WBC Morphology NOT REPORTED     RBC Morphology NOT REPORTED     Platelet Estimate NOT REPORTED     Seg Neutrophils 40 %    Lymphocytes 22 %    Monocytes 30 %    Eosinophils % 8 %    Basophils 0 %    nRBC 2 (H) 0 per 100 WBC    Segs Absolute 0.96 (L) 1.8 - 7.7 k/uL    Absolute Lymph # 0.53 (L) 1.0 - 4.8 k/uL    Absolute Mono # 0.72 0.2 - 0.8 k/uL    Absolute Eos # 0.19 0.0 - 0.4 k/uL    Basophils # 0.00 0.0 - 0.2 k/uL    Morphology ANISOCYTOSIS PRESENT    Calcium, Ionized   Result Value Ref Range    Calcium, Ion 1.53 (H) 1.13 - 1.33 mmol/L   Electrophoresis Protein, Serum without Reflex to Immunofixation   Result Value Ref Range    Total Protein 5.7 (L) 6.4 - 8.3 g/dL    Albumin (calculated) Pending g/dL    Albumin % Pending %    Alpha-1-Globulin Pending g/dL    Alpha 1 % Pending %    Alpha-2-Globulin Pending g/dL    Alpha 2 % Pending %    Beta Globulin Pending g/dL    Beta Percent Pending %    Gamma Globulin Pending g/dL    Gamma Globulin % Pending %    Total Prot.  Sum Pending g/dL    Total hydrocephalus. ORBITS: The visualized portion of the orbits demonstrate no acute abnormality. SINUSES: There is scattered mild mucosal thickening in the paranasal sinuses. There is a retention cyst versus polyp in the right frontal sinus. The bilateral mastoid air cells are clear. SOFT TISSUES/SKULL:  There is a 1.5 cm lytic lesion in the right forehead calvarium, stable in size with mildly increased peripheral sclerosis, nonspecific, may be related to chronic sinusitis versus lesions related to history of multiple myeloma. No acute intracranial abnormality. Mild chronic microvascular disease. 1.5 cm lytic lesion in the right forehead calvarium, stable in size with mildly increased peripheral sclerosis, nonspecific, may be related to chronic sinusitis versus lesion related to history of multiple myeloma. Us Renal Complete    Result Date: 10/6/2017  EXAMINATION: RETROPERITONEAL ULTRASOUND OF THE KIDNEYS AND URINARY BLADDER 10/6/2017 COMPARISON: None HISTORY: ORDERING SYSTEM PROVIDED HISTORY: acute renal failure 26-year-old male with acute renal failure and painful urination for 1 month FINDINGS: Kidneys: Right kidney measures 12.9 by 5.1 x 5.3 cm in greatest longitudinal, AP, and transverse dimensions for the total right renal volume of 180.6 mL. Right renal cortical thickness measures up to 1.6 cm. Left kidney measures 11.1 by 6.6 x 4.9 cm in greatest longitudinal, AP, and transverse dimensions for the total left renal volume of 188.1 mL. Left renal cortical thickness measures up to 2.1 cm. Bilateral corticomedullary differentiation is well-maintained. Color flow projects over the bilateral renal parenchyma. No hydronephrosis. No perinephric fluid. Bladder: Prevoid urinary bladder measures 10.5 x 8.5 x 8.9 cm in greatest longitudinal, AP, and transverse dimensions for the total urinary bladder volume of 521.8 mL.  Postvoid urinary bladder measures 3.7 x 6.4 x 6.4 cm for a total postvoid urinary bladder volume of 79.5 mL.     1. Unremarkable renal ultrasound without hydronephrosis. 2. Moderate postvoid residual of the urinary bladder. Problem Lists:   Primary Problem:  Acute kidney injury [facility])   Current Problems:  Active Hospital Problems    Diagnosis Date Noted    Acute encephalopathy [G93.40] 10/09/2017     Priority: High    Hypercalcemia [E83.52] 10/10/2017    Acute kidney injury ([address] [N17.9] 10/06/2017    Pancytopenia ([address] [D61.818] 10/06/2017    Multiple myeloma ([address] [C90.00] 10/06/2017     PMH:  Past Medical History:   Diagnosis Date    [facility] ([facility]     Thoracic or lumbosacral neuritis or radiculitis, unspecified 4/26/2013      Allergies: No Known Allergies       CT PET   9/20/17    <HTML><META HTTP-EQUIV=\"content-type\" CONTENT=\"text/html;charset=utf-8\"><BR>IMPRESSION: <BR>1. Very prominent focal area of high glucose uptake identified within the sternum adjacent to left side of the sternal clavicular head measuring  <BR>approximate 2.5 cm in size likely presents a new area of metastatic deposition. Additional areas described above with increased glucose utilization  <BR>within the axial skeleton may represent minimal change compared to prior exam with superimposed chemotherapeutic effect and increased activity  <BR>diffusely involving the skeleton and marrow. <BR>2. There is no evidence for neck lymphadenopathy, pelvis or abdomen intraperitoneal adenopathy or metastatic deposition within the liver. <BR>3.  formerly reported small focus of abnormal tracer uptake in the left radius near the left elbow less conspicuous today.     Xr Chest Standard (2 Vw)              Assessment      Chief Complaint   Patient presents with    Other     voice hoarse slow to respond     Patient Active Problem List   Diagnosis    Psychiatric diagnosis    Thoracic or lumbosacral neuritis or radiculitis, unspecified    Cancer ([facility]    Hyponatremia    FUO (fever of unknown origin)    Bronchitis   

## 2017-10-14 NOTE — PROGRESS NOTES
Pt remains confused, disoriented. Restless at times, pulling at lines, tubes, clothing. Requires hands-on redirection often. Continue to monitor.

## 2017-10-14 NOTE — PROGRESS NOTES
release tablet 5 mg Q4H PRN   sodium chloride flush 0.9 % injection 10 mL 2 times per day   sodium chloride flush 0.9 % injection 10 mL PRN   acetaminophen (TYLENOL) tablet 650 mg Q4H PRN   ondansetron (ZOFRAN) injection 4 mg Q6H PRN   famotidine (PEPCID) tablet 20 mg Daily   acyclovir (ZOVIRAX) capsule 400 mg Daily   diphenhydrAMINE (BENADRYL) tablet 25 mg Nightly PRN         LABS      CBC:   Recent Labs      10/12/17   0645  10/13/17   0609  10/14/17   0805   WBC  2.1*  2.4*  2.3*   RBC  2.85*  3.12*  3.16*   HGB  8.9*  9.6*  10.4*   HCT  26.5*  28.9*  29.3*   MCV  93.1  92.8  92.7   MCH  31.5  30.8  32.9   MCHC  33.8  33.2  35.5   RDW  14.9*  14.0  14.2   PLT  49*  56*  53*   MPV  7.0  NOT REPORTED  NOT REPORTED      BMP:   Recent Labs      10/12/17   0645  10/13/17   0609  10/14/17   0805   NA  144  148*  148*   K  3.9  3.9  4.3   CL  110*  112*  113*   CO2  21  21  22   BUN  31*  30*  30*   CREATININE  2.90*  2.81*  2.78*   GLUCOSE  136*  123*  115*   CALCIUM  11.1*  11.2*  10.4      BNP:No results found for: BNP  PHOSPHORUS:    No results for input(s): PHOS in the last 72 hours.   MAGNESIUM:   Recent Labs      10/12/17   0645  10/13/17   0609  10/14/17   0805   MG  2.0  1.8  1.9       SPEP:   Lab Results   Component Value Date    PROT 5.7 10/13/2017    ALBCAL Pending 10/13/2017    ALBPCT Pending 10/13/2017    LABALPH Pending 10/13/2017    LABALPH Pending 10/13/2017    A1PCT Pending 10/13/2017    A2PCT Pending 10/13/2017    LABBETA Pending 10/13/2017    BETAPCT Pending 10/13/2017    GAMGLOB Pending 10/13/2017    GGPCT Pending 10/13/2017    PATH Pending 10/13/2017     UPEP:   Lab Results   Component Value Date    TPU 95 10/09/2017    TPU 95 10/09/2017      URINE SODIUM:    Lab Results   Component Value Date    KECIA 51 10/06/2017      URINE CREATININE:    Lab Results   Component Value Date    LABCREA 52.9 10/07/2017     URINE PROTEIN:    Lab Results   Component Value Date    TPU 95 10/09/2017    TPU 95 10/09/2017 URINALYSIS:  U/A:   Lab Results   Component Value Date    NITRU NEGATIVE 10/06/2017    COLORU YELLOW 10/06/2017    PHUR 5.5 10/06/2017    WBCUA 0 TO 2 10/06/2017    RBCUA 2 TO 5 10/06/2017    MUCUS 1+ 10/06/2017    TRICHOMONAS NOT REPORTED 10/06/2017    YEAST NOT REPORTED 10/06/2017    BACTERIA FEW 10/06/2017    SPECGRAV 1.020 10/06/2017    LEUKOCYTESUR NEGATIVE 10/06/2017    UROBILINOGEN Normal 10/06/2017    BILIRUBINUR NEGATIVE 10/06/2017    GLUCOSEU NEGATIVE 10/06/2017    KETUA NEGATIVE 10/06/2017    AMORPHOUS NOT REPORTED 10/06/2017       ASSESSMENT      1. SHEKHAR :Secondary to hypercalcemia and vasoconstriction. Serum creatinine gradually improving. Serum calcium level is better   2. Hypercalcemia : Secondary to underlying myeloma, improved  3. Multiple myeloma being currently treated by hematology. 4.  Encephalopathy: Possibly secondary to hypercalcemia. Overall a little better    PLAN      1. Cont half-normal saline at 125 an hour   2. S/P zometa, calcitonin, calcium improved  3. Follow up labs ordered. 4. Following along         Please do not hesitate to call with questions.     Electronically signed by Yonatan Rajan MD on 10/14/2017 at 3:08 PM

## 2017-10-15 NOTE — PROGRESS NOTES
HFA, oxyCODONE, sodium chloride flush, acetaminophen, ondansetron, diphenhydrAMINE    Data:     Past Medical History:   has a past medical history of Multiple myeloma ([address] and Thoracic or lumbosacral neuritis or radiculitis, unspecified. Social History:   reports that he has never smoked. He has quit using smokeless tobacco. He reports that he drinks alcohol. He reports that he does not use drugs. Family History:   Family History   Problem Relation Age of Onset    Other Father      \"heart issues\"   [employer] Cancer Mother      lung       Vitals:  /75   Pulse 112   Temp 97.3 °F (36.3 °C) (Oral)   Resp 18   Ht 5' 10\" (1.778 m)   Wt 179 lb 1.6 oz (81.2 kg)   SpO2 98%   BMI 25.70 kg/m²   Temp (24hrs), Av.7 °F (36.5 °C), Min:97.3 °F (36.3 °C), Max:98.2 °F (36.8 °C)    No results for input(s): POCGLU in the last 72 hours. I/O (24Hr):     Intake/Output Summary (Last 24 hours) at 10/15/17 0727  Last data filed at 10/15/17 0501   Gross per 24 hour   Intake           2919.7 ml   Output             4000 ml   Net          -1080.3 ml       Labs:    Hematology:  Recent Labs      10/13/17   0609  10/14/17   0805  10/15/17   0604   WBC  2.4*  2.3*  2.2*   RBC  3.12*  3.16*  2.66*   HGB  9.6*  10.4*  8.4*   HCT  28.9*  29.3*  24.7*   MCV  92.8  92.7  92.9   MCH  30.8  32.9  31.6   MCHC  33.2  35.5  34.0   RDW  14.0  14.2  14.4   PLT  56*  53*  47*   MPV  NOT REPORTED  NOT REPORTED  7.0     Chemistry:  Recent Labs      10/13/17   0609  10/14/17   0805  10/15/17   0604   NA  148*  148*  145*   K  3.9  4.3  3.7   CL  112*  113*  110*   CO2  21  22  20   GLUCOSE  123*  115*  113*   BUN  30*  30*  27*   CREATININE  2.81*  2.78*  2.67*   MG  1.8  1.9  1.7   ANIONGAP  15  13  15   LABGLOM  23*  23*  24*   GFRAA  28*  28*  29*   CALCIUM  11.2*  10.4  9.6   CAION  1.53*   --    --      Recent Labs      10/13/17   1626   PROT  5.7*         Lab Results   Component Value Date/Time    SPECIAL NOT REPORTED 2016 11:34 PM Lab Results   Component Value Date/Time    CULTURE NO GROWTH 02/28/2016 11:34 PM    CULTURE  02/28/2016 11:34 PM     Performed at [facility], [address] [place] [address] [phone]       No results found for: POCPH, PHART, PH, POCPCO2, PQK7YOR, PCO2, POCPO2, PO2ART, PO2, POCHCO3, DFH7QUK, HCO3, NBEA, PBEA, BEART, BE, THGBART, THB, KOM9NEJ, SWBI8XPY, F3IAAGFA, O2SAT, FIO2    Radiology:    Nothing new    Physical Examination:        General appearance:  alert, cooperative and no distress  Mental Status:  oriented to person, place (hospital, but not [facility]) and normal affect; thought it was 2018  Lungs:  clear to auscultation bilaterally, normal effort  Heart:  regular rate and rhythm, no murmur  Abdomen:  soft, nontender, nondistended, normal bowel sounds, no masses, hepatomegaly, splenomegaly  Extremities:  no edema, redness, tenderness in the calves  Skin:  no gross lesions, rashes, induration    Assessment:        Primary Problem  Acute kidney injury [facility])    C[facility]Andre[address] [address] Problems    Diagnosis Date Noted    Acute encephalopathy [G93.40] 10/09/2017     Priority: High    Hypercalcemia [E83.52] 10/10/2017    Acute kidney injury ([address] [N17.9] 10/06/2017    Pancytopenia ([address] [D61.818] 10/06/2017    Multiple myeloma ([facility] [C90.00] 10/06/2017       Plan:        1. Monitor renal fcn  2. For neuro: are there any other neurological tests to help determine if there is a reversible cause of his encephalopathy? (calcium better and rich improving, but still confused)  Or is this considered to be progression of his MM?     Peyman Nolen DO  10/15/2017  7:27 AM

## 2017-10-15 NOTE — PROGRESS NOTES
Daily Neuro Progress Note       10/15/2017   8:18 AM  Patient info:  Shyam Leon is a 57 y.o. male  Account No.:  [de-identified]  Acct:     [de-identified]   MRN:   [id number]  :   1955    Room:  [facility]0291-11   Day: 9    Date of Admission:  10/6/2017 10:28 AM  PCP: Kalyn Martines MD     History of Present Illness: Shyam Leon is a 57 y.o. White male who presented with concerns regarding encephalopathy. PMH:   Past Medical History:   Diagnosis Date    Multiple myeloma ([address]     Thoracic or lumbosacral neuritis or radiculitis, unspecified 2013       PSH:   Past Surgical History:   Procedure Laterality Date    BONE MARROW TRANSPLANT  2013    KNEE ARTHROSCOPY Bilateral     KYPHOSIS SURGERY      SHOULDER ARTHROSCOPY Bilateral     TUNNELED VENOUS PORT PLACEMENT         Allergies: No Known Allergies    Home Meds:Prescriptions Prior to Admission: oxyCODONE (OXYCONTIN) 40 MG extended release tablet, Take 40 mg by mouth every 12 hours  . oxyCODONE (ROXICODONE) 5 MG immediate release tablet, Take 5 mg by mouth every 4 hours as needed for Pain . aspirin 81 MG chewable tablet, Take 81 mg by mouth daily  albuterol sulfate HFA (PROAIR HFA) 108 (90 BASE) MCG/ACT inhaler, Inhale 2 puffs into the lungs every 6 hours as needed for Wheezing or Shortness of Breath  pomalidomide (POMALYST) 4 MG capsule, Take 4 mg by mouth daily Indications: 21 days on, 7 days off cycle On empty stomach  acyclovir (ZOVIRAX) 400 MG tablet, Take 400 mg by mouth daily Indications: FOR PREVENTION OF SHINGLES     Social History:   Social History     Social History    Marital status:      Spouse name: N/A    Number of children: N/A    Years of education: N/A     Occupational History    Not on file.      Social History Main Topics    Smoking status: Never Smoker    Smokeless tobacco: Former User    Alcohol use Yes      Comment: occ    Drug use: No    Sexual activity: Not on file     Other Topics Concern    Not on file     Social History Narrative    No narrative on file       Family History:   Family History   Problem Relation Age of Onset    Other Father      \"heart issues\"    Cancer Mother      lung       Review of Systems: IMELDA SEGURA is not valid from patient due to mentation. Physical Exam:    Admission Weight: Weight: 204 lb (92.5 kg)  Current Vital Signs: Blood pressure (!) 147/71, pulse 97, temperature 98.1 °F (36.7 °C), temperature source Oral, resp. rate 16, height 5' 10\" (1.778 m), weight 179 lb 1.6 oz (81.2 kg), SpO2 99 %. Vital signs in last 24 hours:  Temp (24hrs), Av.8 °F (36.6 °C), Min:97.3 °F (36.3 °C), Max:98.2 °F (36.8 °C)      Intake/Output last 3 shifts:  I/O last 3 completed shifts: In: 2919.7 [I.V.:2919.7]  Out: 4000 [Urine:4000]    Intake/Output this shift:  No intake/output data recorded.     Physical Exam:  BP (!) 147/71   Pulse 97   Temp 98.1 °F (36.7 °C) (Oral)   Resp 16   Ht 5' 10\" (1.778 m)   Wt 179 lb 1.6 oz (81.2 kg)   SpO2 99%   BMI 25.70 kg/m²     General Appearance:    Alert, cooperative, no distress, appears stated age   Head:    Normocephalic, without obvious abnormality, atraumatic   Eyes:    PERRL, conjunctiva/corneas clear, EOM's intact, fundi     benign, both eyes        Ears:    Normal TM's and external ear canals, both ears   Nose:   Nares normal, mucosa normal, no drainage   Throat:   Lips, mucosa, and tongue normal; teeth and gums normal   Neck:   Supple, symmetrical, trachea midline, no adenopathy;        thyroid:  No enlargement/tenderness/nodules; no carotid    bruit or JVD     Back:     Symmetric, no curvature, ROM normal, no CVA tenderness     Lungs:     Clear to auscultation bilaterally, respirations unlabored   Chest wall:    No tenderness or deformity   Heart:    Regular rate and rhythm, S1 and S2 normal, no murmur, rub   or gallop   Abdomen:     Soft, non-tender, bowel sounds active all four quadrants,     no masses, no organomegaly   Genitalia: Deferred   Rectal:    Deferred   Extremities:   Extremities normal, atraumatic, no cyanosis or edema   Pulses:   2+ and symmetric all extremities   Skin:   Skin color, texture, turgor normal, no rashes or lesions   Lymph nodes:   Cervical, supraclavicular, and axillary nodes normal     Neurologic:       CNII-XII intact. Normal strength, sensation and reflexes       Throughout, mentation is still quite suspect. He has poor memory of recent and long-term events. Questionable judgement     Principal Problem:    Acute kidney injury ([facility] [address]  Active Problems:    Acute encephalopathy    Pancytopenia ([facility] [address]    Multiple myeloma ([facility] [address]    Hypercalcemia       LOS: 9 days     Code Status:  Full Code    Current Meds:    polyethylene glycol  17 g Oral Daily    aspirin  81 mg Oral Daily    oxyCODONE  40 mg Oral Q12H    sodium chloride flush  10 mL Intravenous 2 times per day    famotidine  20 mg Oral Daily    acyclovir  400 mg Oral Daily     LORazepam, albuterol sulfate HFA, oxyCODONE, sodium chloride flush, acetaminophen, ondansetron, diphenhydrAMINE    Lab Results   Component Value Date     (H) 10/15/2017    K 3.7 10/15/2017     (H) 10/15/2017    CO2 20 10/15/2017    BUN 27 (H) 10/15/2017    CREATININE 2.67 (H) 10/15/2017    GLUCOSE 113 (H) 10/15/2017    CALCIUM 9.6 10/15/2017    PROT 5.7 (L) 10/13/2017    LABALBU 4.1 10/06/2017    BILITOT 0.32 10/06/2017    ALKPHOS 69 10/06/2017    AST 17 10/06/2017    ALT 12 10/06/2017    LABGLOM 24 (L) 10/15/2017    GFRAA 29 (L) 10/15/2017       Xr Chest Standard (2 Vw)    Result Date: 10/6/2017  EXAMINATION: TWO VIEWS OF THE CHEST 10/6/2017 11:26 am COMPARISON: Chest x-ray from 02/28/2016 HISTORY: ORDERING SYSTEM PROVIDED HISTORY: AMS TECHNOLOGIST PROVIDED HISTORY: Reason for exam:->AMS Ordering Physician Provided Reason for Exam: Altered mental status x 3 days.  hx of mult myeloma Acuity: Acute Type of Exam: Initial FINDINGS: Left chest port catheter extends to the level of multisequence MRI of the brain was performed without the administration of intravenous contrast. COMPARISON: None. HISTORY: ORDERING SYSTEM PROVIDED HISTORY: altered mentation Initial evaluation. FINDINGS: INTRACRANIAL STRUCTURES/VENTRICLES: There is no acute infarct. No mass effect or midline shift. No abnormal extra-axial fluid collection. There is prominence of the ventricles and sulci due to global parenchymal volume loss. The sellar/suprasellar regions appear unremarkable. The normal signal voids within the major intracranial vessels appear maintained. ORBITS: The visualized portion of the orbits demonstrate no acute abnormality. SINUSES: Opacification is seen involving the right frontal sinuses. The mastoid air cells are clear. BONES/SOFT TISSUES: There is diffusely decreased T1 marrow signal.  There appears to be a T2 hyperintense lesions seen involving is the right sphenoid bone The craniocervical junction appears unremarkable. 1. No acute intracranial abnormality. No acute infarct. 2. Minimal global parenchymal volume loss. 3. Diffusely decreased T1 marrow signal, which is nonspecific, but can be seen with chronic anemia as well as a diffusely infiltrative marrow process. 4. A T2 hyperintense lesion is seen within the right sphenoid bone, which could be related to patient's history of multiple myeloma. ASSESSMENT and PLAN    This is a 57year-old gentleman who has history of longstanding problems, however, most recently he has been encephalopathic. Reviewed MRI. No stroke or other mass lesion. Skull may demonstrate myeloma issue.     No family here at bedside this am.  Has a sitter 24 hours. In spite of this, he ripped out his port infusion yesterday. There is a question of the need for LP? However, on a practical note low platelets would speak against such a course of action. In addition, not sure the information would change care much.   If necessary, at a later date this can be

## 2017-10-15 NOTE — PROGRESS NOTES
Nutrition Assessment    Type and Reason for Visit: Reassess    Nutrition Recommendations: 1. Suggest modify diet to 2 gm Na+. 2. Suggest Nepro ONS. 3. Feed assistance as needed. 4. Reassess oral intakes for improvement tomorrow and possible TF recommendation should PO intake remain < 50% of meals. Malnutrition Assessment:  · Malnutrition Status: Meets the criteria for severe malnutrition  · Context:    · Findings of the 6 clinical characteristics of malnutrition (Minimum of 2 out of 6 clinical characteristics is required to make the diagnosis of moderate or severe Protein Calorie Malnutrition based on AND/ASPEN Guidelines):  1. Energy Intake-Less than or equal to 50%, greater than or equal to 5 days    2. Weight Loss-2% loss or greater, in 6 months  3. Fat Loss-Moderate subcutaneous fat loss, Orbital, Triceps  4. Muscle Loss-Severe muscle mass loss, Calf (gastrocnemius), Interosseous, Clavicles (pectoralis and deltoids)  5. Fluid Accumulation-No significant fluid accumulation,    6.  Strength-Not measured    Nutrition Diagnosis:   · Problem: Inadequate oral intake  · Etiology: related to Catabolic illness     Signs and symptoms:  as evidenced by Intake 0-25%, Weight loss (4.9% wt loss x 6 months)    Nutrition Assessment:  · Subjective Assessment: Pt seems slightly confused. He is taking in low amounts or oral foods. Pt states he is not hungry, and does not answer questions appropriately at times. Suggested and explained tube feed to pt. He states \"he don't think it will help\". Pt is aware of wt loss, and shown loss of muscle, and seems to understand some things. Suggested changing ONS to Ensure Enlive and pt seems to be receptive and even is affirmative when asked if he needed some help when eating foods. Will need to speak with pt's wife tomorrow. Pt tells writer a UBW is ~200#. Todays wt is 179#. May need re-weight.   · Nutrition-Focused Physical Findings: GI: +rounded, +soft, +loss of appetite, +soft stool, +passing flatus, +active bowel sounds, +PV: WDL   · Wound Type: None  · Current Nutrition Therapies:  · Oral Diet Orders: Renal   · Oral Diet intake: Unable to assess  · Anthropometric Measures:  · Ht: 5' 10\" (177.8 cm)   · Current Body Wt: 179 lb 0.2 oz (81.2 kg)  · Admission Body Wt: 203 lb 14.8 oz (92.5 kg)  · Usual Body Wt: 210 lb 15.7 oz (95.7 kg) (4/12/17)  · % Weight Change: 4.9%,  x 6 months  · Ideal Body Wt: 163 lb 2.3 oz (74 kg), % Ideal Body 123%  · BMI Classification: BMI 25.0 - 29.9 Overweight  · Comparative Standards (Estimated Nutrition Needs):  · Estimated Daily Total Kcal: 2,400-2,550 kcal  · Estimated Daily Protein (g):  g  · Estimated Daily Fluids (mL): 2,200-2,300 ml    Estimated Intake vs Estimated Needs: Intake Less Than Needs    Nutrition Risk Level: High    Nutrition Interventions:   Modify current diet, Modify current ONS  Continued Inpatient Monitoring, Coordination of Care    Nutrition Evaluation:   · Evaluation: Progressing toward goals   · Goals: PO intake to meet >75% of estimated kcal/protein needs, with good GI tolerance / management of renal labs    · Monitoring: Meal Intake, Supplement Intake, Diet Tolerance, Skin Integrity, Ascites/Edema, Mental Status/Confusion, Weight, Pertinent Labs, Chewing/Swallowing, Nausea or Vomiting    See Adult Nutrition Doc Flowsheet for more detail.      Electronically signed by Sebas HAINES, JACQUELINE, LD on 10/15/2017 at 5:08 PM    Contact Number:  [phone]

## 2017-10-15 NOTE — PROGRESS NOTES
URINALYSIS:  U/A:   Lab Results   Component Value Date    NITRU NEGATIVE 10/06/2017    COLORU YELLOW 10/06/2017    PHUR 5.5 10/06/2017    WBCUA 0 TO 2 10/06/2017    RBCUA 2 TO 5 10/06/2017    MUCUS 1+ 10/06/2017    TRICHOMONAS NOT REPORTED 10/06/2017    YEAST NOT REPORTED 10/06/2017    BACTERIA FEW 10/06/2017    SPECGRAV 1.020 10/06/2017    LEUKOCYTESUR NEGATIVE 10/06/2017    UROBILINOGEN Normal 10/06/2017    BILIRUBINUR NEGATIVE 10/06/2017    GLUCOSEU NEGATIVE 10/06/2017    KETUA NEGATIVE 10/06/2017    AMORPHOUS NOT REPORTED 10/06/2017       ASSESSMENT      1. SHEKHAR :Secondary to hypercalcemia and vasoconstriction. Serum creatinine gradually improving. Serum calcium level is better   2. Hypercalcemia : Secondary to underlying myeloma, improved  3. Multiple myeloma being currently treated by hematology. 4.  Encephalopathy: Stable  5. Hypernatremia    PLAN      1. Change to 0.45% NS at 125 ml/hr for elevated Na level  2. S/P zometa, calcitonin, calcium improved  3. Follow up labs ordered. 4. Following along         Please do not hesitate to call with questions.     Electronically signed by Dwight Mai MD on 10/15/2017 at 2:33 PM

## 2017-10-15 NOTE — PLAN OF CARE
Problem: Nutrition  Goal: Optimal nutrition therapy  Nutrition Problem: Inadequate oral intake  Intervention: Food and/or Nutrient Delivery: Modify current diet, Modify current ONS  Nutritional Goals: PO intake to meet >75% of estimated kcal/protein needs, with good GI tolerance / management of renal labs    Outcome: Ongoing

## 2017-10-16 NOTE — PROGRESS NOTES
agreeable to PT   Pain Screening   Patient Currently in Pain Denies   Bed Mobility   Rolling Stand by assistance   Supine to Sit Stand by assistance   Comment assisted pt donning socks   Transfers   Sit to Stand Contact guard assistance   Stand to sit Contact guard assistance   Comment pt impulsive, once standing pt's wife arrived to room and requested pt's PT session be deferred to later as he has important papers to sign and she doesnt want him too tired to sign documents    Ambulation   Ambulation? No   Other Activities   Comment assisted pt back to bed   Short term goals   Short term goal 1 Pt indep with transfers without loss of balance   Short term goal 2 Pt amb 200 ft with least restrictive device and supervision   Short term goal 3 Pt negotiate 4 stairs with 1 rail and SBA   Short term goal 4 Pt improve standing balance to good with support   Conditions Requiring Skilled Therapeutic Intervention   Body structures, Functions, Activity limitations Decreased functional mobility ; Decreased strength;Decreased balance;Decreased endurance   Discharge Recommendations Continue to assess pending progress;Home with assist PRN;Outpatient PT   Activity Tolerance   Activity Tolerance Patient Tolerated treatment well   Plan   Times per week 1-2x/day   Current Treatment Recommendations Strengthening;Balance Training;Functional Mobility Training; Endurance Training;Transfer Training;Gait Training;Stair training   Clifton Dial PTA     Time in: 1057 Time out: 1121

## 2017-10-16 NOTE — PROGRESS NOTES
Date:                           10/16/2017  Patient name:           Gretel Raymond  Date of admission:  10/6/2017 10:28 AM  MRN:   [id number]  YOB: 1955  PCP:                           Latonia Og MD      Subjective:       Patient seen and examined. Patient confused , but overall feels better, still has hard time naming things, but more conversant     REVIEW OF SYSTEMS:  Pt denies all symptoms. Objective:     Vitals: BP (!) 141/71   Pulse 119   Temp 98.4 °F (36.9 °C) (Oral)   Resp 20   Ht 5' 10\" (1.778 m)   Wt 179 lb 1.6 oz (81.2 kg)   SpO2 99%   BMI 25.70 kg/m²   General appearance - Not in pain or distress  Mental status - mildly confused,   Eyes - pupils equal and reactive, extraocular eye movements intact  Ears - bilateral TM's and external ear canals normal  Nose - normal and patent, no erythema, discharge or polyps  Mouth - mucous membranes moist, pharynx normal without lesions. Hoarseness of voice.   Neck - supple, no significant adenopathy  Lymphatics - no palpable lymphadenopathy, no hepatosplenomegaly  Chest - clear to auscultation, no wheezes, rales or rhonchi, symmetric air entry  Heart - normal rate, regular rhythm, normal S1, S2, no murmurs, rubs, clicks or gallops  Abdomen - soft, nontender, nondistended, no masses or organomegaly  Neurological - Confused  Musculoskeletal - no joint tenderness, deformity or swelling  Extremities - peripheral pulses normal, no pedal edema, no clubbing or cyanosis  Skin - normal coloration and turgor, no rashes, no suspicious skin lesions noted           Data:    I/O this shift:  In: -   Out: 700 [Urine:700]  In: 270 [Other:270]  Out: 2050 [Urine:2050]    CBC:   Recent Labs      10/14/17   0805  10/15/17   0604  10/16/17   0500   WBC  2.3*  2.2*  1.6*   HGB  10.4*  8.4*  7.8*   PLT  53*  47*  38*     BMP:    Recent Labs      10/14/17   0805  10/15/17   0604  10/16/17   0500   NA  148*  145*  144   K  4.3  3.7  3.7 CL  113*  110*  110*   CO2  22  20  20   BUN  30*  27*  25*   CREATININE  2.78*  2.67*  2.52*   GLUCOSE  115*  113*  93   calcium 9.6         Problem Lists:   Primary Problem:  Acute kidney injury [facility])   Current Problems:  Active Hospital Problems    Diagnosis Date Noted    Acute encephalopathy [G93.40] 10/09/2017     Priority: High    Hypercalcemia [E83.52] 10/10/2017    Acute kidney injury ([facility] [address] [N17.9] 10/06/2017    Pancytopenia ([address] [D61.818] 10/06/2017    Multiple myeloma ([facility] [address] [C90.00] 10/06/2017     PMH:  Past Medical History:   Diagnosis Date    Multiple myeloma ([facility] [address]     Thoracic or lumbosacral neuritis or radiculitis, unspecified 4/26/2013      Allergies: No Known Allergies       CT PET   9/20/17    <HTML><META HTTP-EQUIV=\"content-type\" CONTENT=\"text/html;charset=utf-8\"><BR>IMPRESSION: <BR>1. Very prominent focal area of high glucose uptake identified within the sternum adjacent to left side of the sternal clavicular head measuring  <BR>approximate 2.5 cm in size likely presents a new area of metastatic deposition. Additional areas described above with increased glucose utilization  <BR>within the axial skeleton may represent minimal change compared to prior exam with superimposed chemotherapeutic effect and increased activity  <BR>diffusely involving the skeleton and marrow. <BR>2. There is no evidence for neck lymphadenopathy, pelvis or abdomen intraperitoneal adenopathy or metastatic deposition within the liver. <BR>3.  formerly reported small focus of abnormal tracer uptake in the left radius near the left elbow less conspicuous today.     Xr Chest Standard (2 Vw)              Assessment      Chief Complaint   Patient presents with    Other     voice hoarse slow to respond     Patient Active Problem List   Diagnosis    Psychiatric diagnosis    Thoracic or lumbosacral neuritis or radiculitis, unspecified    Cancer ([facility] [address]    Hyponatremia    FUO (fever of unknown origin)    Bronchitis

## 2017-10-16 NOTE — PLAN OF CARE
Problem: Falls - Risk of  Goal: Absence of falls  Outcome: Ongoing  Patient is fall risk per fall scale. Falling star on door. Fall sticker on armband. Hourly rounding performed. Personal belongings and call light within reach. Bed in low position. Bed alarm on and 1:1 sitter at bedside for safety.

## 2017-10-16 NOTE — PROGRESS NOTES
Physical Therapy  DATE: 10/16/2017    NAME: Ana Doe  MRN: [id number]   : 1955     10/16/17 1345   Restrictions/Precautions   Restrictions/Precautions General Precautions; Fall Risk   General   Chart Reviewed Yes   Pain Screening   Patient Currently in Pain Denies   Bed Mobility   Rolling Minimal assistance   Supine to Sit Contact guard assistance   Scooting Minimal assistance   Transfers   Sit to Stand Minimal Assistance   Stand to sit Minimal Assistance   Stand Pivot Transfers Contact guard assistance   Ambulation   Ambulation? Yes   Ambulation 1   Surface level tile   Device Rolling Walker   Assistance Contact guard assistance   Quality of Gait Small step length but consistent pace with mild instability using single UE on IV pole. Distance 130 ft   Other Activities   Comment assisted pt to bathroom for brushing his teeth sinkside pt requires verbal cues for proper sequencing in order to complete the task of brushing his teeth    Short term goals   Short term goal 1 Pt indep with transfers without loss of balance   Short term goal 2 Pt amb 200 ft with least restrictive device and supervision   Short term goal 3 Pt negotiate 4 stairs with 1 rail and SBA   Short term goal 4 Pt improve standing balance to good with support   Conditions Requiring Skilled Therapeutic Intervention   Body structures, Functions, Activity limitations Decreased functional mobility ; Decreased strength;Decreased balance;Decreased endurance   Assessment pt less confused but still requires redirection     Discharge Recommendations Continue to assess pending progress;Home with assist PRN;Outpatient PT   Activity Tolerance   Activity Tolerance Patient Tolerated treatment well   Plan   Times per week 1-2x/day   Current Treatment Recommendations Strengthening;Balance Training;Functional Mobility Training; Endurance Training;Transfer Training;Gait Training;Stair training   Sherwood Julian PTA     Time in; 1345 Time out; [phone]

## 2017-10-16 NOTE — PROGRESS NOTES
Nepro  · ONS intake: 1-25%  · Anthropometric Measures:  · Ht: 5' 10\" (177.8 cm)   · Current Body Wt: 179 lb 0.2 oz (81.2 kg)  · Admission Body Wt: 203 lb 14.8 oz (92.5 kg)  · Usual Body Wt: 210 lb 15.7 oz (95.7 kg) (4/12/17)  · % Weight Change: 4.9%,  x 6 months  · Ideal Body Wt: 163 lb 2.3 oz (74 kg), % Ideal Body 123%  · BMI Classification: BMI 25.0 - 29.9 Overweight  · Comparative Standards (Estimated Nutrition Needs):  · Estimated Daily Total Kcal: 2,400-2,550 kcal  · Estimated Daily Protein (g):  g    Estimated Intake vs Estimated Needs: Intake Improving    Nutrition Risk Level: High    Nutrition Interventions:   Continue current diet, Continue current ONS  Continued Inpatient Monitoring, Education not appropriate at this time, Coordination of Care    Nutrition Evaluation:   · Evaluation: Goals set   · Goals: PO intake to meet >75% of estimated kcal/protein needs, with good GI tolerance / management of renal labs    · Monitoring: Meal Intake, Supplement Intake, Diet Tolerance, Skin Integrity, Ascites/Edema, Mental Status/Confusion, Weight, Pertinent Labs, Chewing/Swallowing, Nausea or Vomiting    See Adult Nutrition Doc Flowsheet for more detail.      Electronically signed by Kasey HAINES RDN, STEFAN on 10/16/2017 at 5:27 PM    Contact Number:  [phone]

## 2017-10-16 NOTE — PROGRESS NOTES
Ana Doe is a 57 y.o. male patient.     Current Facility-Administered Medications   Medication Dose Route Frequency Provider Last Rate Last Dose    QUEtiapine (SEROQUEL) tablet 25 mg  25 mg Oral Nightly Peyman P Blood, DO   25 mg at 10/15/17 2019    0.45 % sodium chloride infusion   Intravenous Continuous Ivanna Woodard  mL/hr at 10/16/17 9304      LORazepam (ATIVAN) injection 0.5 mg  0.5 mg Intravenous Q6H PRN Peyman BAUMANN Blood, DO   0.5 mg at 10/14/17 1623    polyethylene glycol (GLYCOLAX) packet 17 g  17 g Oral Daily Marilou Carrillo MD   17 g at 10/16/17 5507    aspirin chewable tablet 81 mg  81 mg Oral Daily Noé DOMINGUEZ Orlop, DO   81 mg at 10/16/17 0927    albuterol sulfate  (90 Base) MCG/ACT inhaler 2 puff  2 puff Inhalation Q6H PRN Dennis Jade Orlop, DO        oxyCODONE (OXYCONTIN) extended release tablet 40 mg  40 mg Oral Q12H Noé DOMINGUEZ Orlop, DO   40 mg at 10/16/17 6113    oxyCODONE (ROXICODONE) immediate release tablet 5 mg  5 mg Oral Q4H PRN Dennis Jade Orlop, DO   5 mg at 10/08/17 7024    sodium chloride flush 0.9 % injection 10 mL  10 mL Intravenous 2 times per day Dennis Jade Orlop, DO   10 mL at 10/13/17 0819    sodium chloride flush 0.9 % injection 10 mL  10 mL Intravenous PRN Dennis Phillipslop, DO        acetaminophen (TYLENOL) tablet 650 mg  650 mg Oral Q4H PRN Dennis Jade Orlop, DO   650 mg at 10/07/17 2217    ondansetron (ZOFRAN) injection 4 mg  4 mg Intravenous Q6H PRN Dennis Phillipslop, DO        famotidine (PEPCID) tablet 20 mg  20 mg Oral Daily Dennis Jade Orlop, DO   20 mg at 10/16/17 8133    acyclovir (ZOVIRAX) capsule 400 mg  400 mg Oral Daily Dennis Jade Orlop, DO   400 mg at 10/16/17 9600    diphenhydrAMINE (BENADRYL) tablet 25 mg  25 mg Oral Nightly PRN Dennis Jackman, DO   25 mg at 10/14/17 2042     No Known Allergies  Principal Problem:    Acute kidney injury ([facility] [address]  Active Problems:    Acute encephalopathy    Pancytopenia ([facility] [address]    Multiple myeloma ([facility] [address]

## 2017-10-16 NOTE — PROGRESS NOTES
term goal 4 demo functional mobility to/from bathroom with ae/ad and safety awareness with SBA   Assessment   Performance deficits / Impairments Decreased ADL status; Decreased functional mobility ; Decreased balance;Decreased high-level IADLs;Decreased safe awareness;Decreased cognition   Prognosis Good   Patient Education OT POC, safety, task segmentation, discharge planning   REQUIRES OT FOLLOW UP Yes   Discharge Recommendations Subacute/Skilled Nursing Facility;Continue to assess pending progress;24 hour supervision or assist   Activity Tolerance   Activity Tolerance Patient Tolerated treatment well;Treatment limited secondary to decreased cognition   Safety Devices   Safety Devices in place Yes   Type of devices Nurse notified; Left in bed;Patient at risk for falls;Gait belt;Call light within reach; Bed alarm in place; All fall risk precautions in place   Restraints   Initially in place No   Other Comments   Comments TIME:[id number]   Plan   Times per week 4-5x   Current Treatment Recommendations Self-Care / ADL;Cognitive/Perceptual Training; Safety Education & Training;Cognitive Reorientation;Balance Training   Upon arrival, pt supine in bed. Supine>sit>stand, demo functional mobility in portillo, to bathroom to stand at sink to complete grooming task. Writer provided the most appropriate environment to increase patient's attention to task, thinking skills and improving memory; breaking down tasks one at a time, providing a quiet environment, repeating information and providing visual images. Patient initiates tasks 100% of the time with VC, plans task 0% of the time, shifts to next step or task appropriately 0% of the time. Upon writer exit, call light within reach, pt retired to bed. All lines intact and patient positioned comfortably. All patient needs addressed prior to ending therapy session. Chart reviewed prior to treatment and patient is agreeable for therapy.   RN reports patient is medically stable for therapy

## 2017-10-16 NOTE — PROGRESS NOTES
Nephrology Progress Note      SUBJECTIVE       Pt was seen and examined. No acute issues overnite. Stable hemodynamics . I had last seen him on Friday, he actually looks better to me today. His wife who was in the room also feels that he's made some progress over the weekend . His serum calcium is down to 9.4. Serum creatinine has improved down to 2.5. His urine output seems to be fairly excellent. Hematology plans for additional myeloma treatment noted    OBJECTIVE      CURRENT TEMPERATURE:  Temp: 97.3 °F (36.3 °C)  MAXIMUM TEMPERATURE OVER 24HRS:  Temp (24hrs), Av.7 °F (36.5 °C), Min:97.3 °F (36.3 °C), Max:98 °F (36.7 °C)    CURRENT RESPIRATORY RATE:  Resp: 18  CURRENT PULSE:  Pulse: 125  CURRENT BLOOD PRESSURE:  BP: (!) 145/57  24HR BLOOD PRESSURE RANGE:  Systolic (41YAQ), JPB:908 , Min:136 , RJC:687   ; Diastolic (87MHH), UOY:17, Min:57, Max:75    24HR INTAKE/OUTPUT:    Intake/Output Summary (Last 24 hours) at 10/16/17 1344  Last data filed at 10/16/17 1118   Gross per 24 hour   Intake              270 ml   Output             2050 ml   Net            -1780 ml     WEIGHT :Patient Vitals for the past 96 hrs (Last 3 readings):   Weight   10/14/17 1449 179 lb 1.6 oz (81.2 kg)     PHYSICAL EXAM      GENERAL APPEARANCE:Awake, alert, in no acute distress  SKIN: warm and dry, no rash or erythema  EYES: conjunctivae normal and sclera anicteric  ENT: no thrush no pharyngeal congestion   NECK:   No JVD. No carotid bruits and no carotid lymphadenopathy . PULMONARY: lungs are clear to auscultation. No Wheezing, no ronchi . CADRDIOVASCULAR: S1 and S2 normal NO S3 and NO S4 . No rubs , no murmur. ABDOMEN: soft nontender, bowel sounds present, no organomegaly, no ascites.        EXTREMITIES: no cyanosis, clubbing or edema     CURRENT MEDICATIONS        QUEtiapine (SEROQUEL) tablet 25 mg Nightly   0.45 % sodium chloride infusion Continuous   LORazepam (ATIVAN) injection 0.5 mg Q6H PRN   polyethylene glycol

## 2017-10-16 NOTE — PROGRESS NOTES
[facility]    Progress Note    10/16/2017    8:27 AM    Name:   Natanael Real  MRN:     [id number]     Acct:      [de-identified]   Room:   [address] [place] [address] Day:  10  Admit Date:  10/6/2017 10:28 AM    PCP:   Alvaro Thomas MD  Code Status:  Full Code    Subjective:     C/C:   Chief Complaint   Patient presents with    Other     voice hoarse slow to respond     Interval History Status: improved. Patient feels well without acute complaints. Denies any chest pain, shortness breath, nausea or vomiting    Brief History: This is a 55-year-old white male was admitted with intermittent confusion and speech problems.  He is found have acute renal failure in the emergency room and has been admitted to the hospital.  He's been treated with IV fluids      Also had hypercalcemia, received calcitonin and 1 dose of zometa     He has a history of multiple myeloma and has been treated with chemotherapy     Encephalopathy has been continuous and has been requiring a sitter for safety    Review of Systems:     Constitutional:  negative for chills, fevers, sweats  Respiratory:  negative for cough, dyspnea on exertion, shortness of breath, wheezing  Cardiovascular:  negative for chest pain, chest pressure/discomfort, lower extremity edema, palpitations  Gastrointestinal:  negative for abdominal pain, constipation, diarrhea, nausea, vomiting  Neurological:  negative for dizziness, headache    Medications:      Allergies:  No Known Allergies    Current Meds:   Scheduled Meds:    QUEtiapine  25 mg Oral Nightly    polyethylene glycol  17 g Oral Daily    aspirin  81 mg Oral Daily    oxyCODONE  40 mg Oral Q12H    sodium chloride flush  10 mL Intravenous 2 times per day    famotidine  20 mg Oral Daily    acyclovir  400 mg Oral Daily     Continuous Infusions:    sodium chloride 125 mL/hr at 10/15/17 1619     PRN Meds: LORazepam, albuterol sulfate HFA, oxyCODONE, sodium

## 2017-10-16 NOTE — PLAN OF CARE
[facility]    Second Visit Note  For more detailed information please refer to the progress note of the day      10/16/2017    3:19 PM    Name:   Dean Lira  MRN:     [id number]     Acct:      [de-identified]   Room:   1009/1009-02   Day:  8  Admit Date:  10/6/2017 10:28 AM    PCP:   Lyndon Bernabe MD  Code Status:  Full Code        Pt vitals were reviewed   New labs were reviewed   Patient was seen    Updated plan :     1. Wife updated and questions answered.         Eva Jackman DO  10/16/2017  3:19 PM

## 2017-10-17 PROBLEM — E43 SEVERE PROTEIN-CALORIE MALNUTRITION (HCC): Status: ACTIVE | Noted: 2017-01-01

## 2017-10-17 NOTE — PLAN OF CARE
Problem: Falls - Risk of  Goal: Absence of falls  Outcome: Met This Shift  Pt cont to be a 1 on 1 supervision, needs met, no injuries    Problem: Pain:  Goal: Pain level will decrease  Pain level will decrease   Outcome: Met This Shift  Pt with routine pain medication, pt denies pain when asked, pt turning self for comfort  Goal: Control of acute pain  Control of acute pain   Outcome: Met This Shift  Pt with routine pain medication, pt denies pain when asked, pt turning self for comfort    Problem: Daily Care:  Goal: Daily care needs are met  Daily care needs are met   Outcome: Not Met This Shift  Pt cont to be confused and attempting to pull aragon and IV access, pt unable to perform ADLS with direction and or unable to perform.   1on1 cont for safety and direction

## 2017-10-17 NOTE — PROGRESS NOTES
Date:                           10/17/2017  Patient name:           Emma Thomas  Date of admission:  10/6/2017 10:28 AM  MRN:   [id number]  YOB: 1955  PCP:                           Marnie Parrish MD      Subjective:       Patient seen and examined. Is much clearer today. Alert and oriented ×2  He has no complaints. He describes no pain. Wife continues to complain about periodic emotional bursts  The patient is constipated times one and half weeks  REVIEW OF SYSTEMS:  Pt denies all symptoms. Objective:     Vitals: /63   Pulse 91   Temp 97.7 °F (36.5 °C) (Oral)   Resp 20   Ht 5' 10\" (1.778 m)   Wt 179 lb 1.6 oz (81.2 kg)   SpO2 98%   BMI 25.70 kg/m²   General appearance - Not in pain or distress  Mental status - mildly confused,   Eyes - pupils equal and reactive, extraocular eye movements intact  Ears - bilateral TM's and external ear canals normal  Nose - normal and patent, no erythema, discharge or polyps  Mouth - mucous membranes moist, pharynx normal without lesions. Hoarseness of voice.   Neck - supple, no significant adenopathy  Lymphatics - no palpable lymphadenopathy, no hepatosplenomegaly  Chest - clear to auscultation, no wheezes, rales or rhonchi, symmetric air entry  Heart - normal rate, regular rhythm, normal S1, S2, no murmurs, rubs, clicks or gallops  Abdomen - soft, nontender, nondistended, no masses or organomegaly  Neurological - Confused  Musculoskeletal - no joint tenderness, deformity or swelling  Extremities - peripheral pulses normal, no pedal edema, no clubbing or cyanosis  Skin - normal coloration and turgor, no rashes, no suspicious skin lesions noted           Data:    I/O this shift:  In: -   Out: 900 [Urine:900]  In: 5219 [I.V.:5219]  Out: 2825 [Urine:2825]    CBC:   Recent Labs      10/15/17   0604  10/16/17   0500  10/17/17   0542   WBC  2.2*  1.6*  2.0*   HGB  8.4*  7.8*  8.0*   PLT  47*  38*  41*     BMP:    Recent Labs 10/15/17   0604  10/16/17   0500  10/17/17   0542   NA  145*  144  144   K  3.7  3.7  3.6*   CL  110*  110*  109*   CO2  20  20  22   BUN  27*  25*  25*   CREATININE  2.67*  2.52*  2.48*   GLUCOSE  113*  93  93   calcium 9.6         Problem Lists:   Primary Problem:  Acute kidney injury [facility])   Current Problems:  Active Hospital Problems    Diagnosis Date Noted    Acute encephalopathy [G93.40] 10/09/2017     Priority: High    Hypercalcemia [E83.52] 10/10/2017    Acute kidney injury ([address] [N17.9] 10/06/2017    Pancytopenia ([facility] [address] [D61.818] 10/06/2017    Multiple myeloma ([facility] [address] [C90.00] 10/06/2017     PMH:  Past Medical History:   Diagnosis Date    Multiple myeloma ([facility] [address]     Thoracic or lumbosacral neuritis or radiculitis, unspecified 4/26/2013      Allergies: No Known Allergies       CT PET   9/20/17    <HTML><META HTTP-EQUIV=\"content-type\" CONTENT=\"text/html;charset=utf-8\"><BR>IMPRESSION: <BR>1. Very prominent focal area of high glucose uptake identified within the sternum adjacent to left side of the sternal clavicular head measuring  <BR>approximate 2.5 cm in size likely presents a new area of metastatic deposition. Additional areas described above with increased glucose utilization  <BR>within the axial skeleton may represent minimal change compared to prior exam with superimposed chemotherapeutic effect and increased activity  <BR>diffusely involving the skeleton and marrow. <BR>2. There is no evidence for neck lymphadenopathy, pelvis or abdomen intraperitoneal adenopathy or metastatic deposition within the liver. <BR>3.  formerly reported small focus of abnormal tracer uptake in the left radius near the left elbow less conspicuous today.     Xr Chest Standard (2 Vw)              Assessment      Chief Complaint   Patient presents with    Other     voice hoarse slow to respond     Patient Active Problem List   Diagnosis    Psychiatric diagnosis    Thoracic or lumbosacral neuritis or radiculitis, unspecified    Cancer ([facility] [address]    Hyponatremia    FUO (fever of unknown origin)    Bronchitis    Acute encephalopathy    Thrombocytopenia (HCC)    Acute kidney injury ([facility] [address]    Pancytopenia ([facility] [address]    Multiple myeloma ([facility] [address]    Hypercalcemia     Plan     Patient has progressive multiple myeloma. Pamolyst d/milo   Plans to treat with carfilzumib and panobinostat as outpatient once patient recovers. Patient received Zometa , calcium is down. Mental status is improving. Blood counts are improving as well. Long discussion with the wife. She is agreeable to take him home. We will work on getting the [place] catheter out as well as dealing with constipation.       [address] Nishant Herrera MD  Hematologist/Medical Oncologist  Cell: [phone]

## 2017-10-17 NOTE — PROGRESS NOTES
6. 5   LABALBU  3.8   AST  21   ALT  21   ALKPHOS  69   BILITOT  0.45   BILIDIR  0.16   AMMONIA  46         Lab Results   Component Value Date/Time    SPECIAL NOT REPORTED 02/28/2016 11:34 PM     Lab Results   Component Value Date/Time    CULTURE NO GROWTH 02/28/2016 11:34 PM    CULTURE  02/28/2016 11:34 PM     Performed at [address] [facility], [address] [place] [address] [phone]       No results found for: POCPH, PHART, PH, POCPCO2, JCT7VMF, PCO2, POCPO2, PO2ART, PO2, POCHCO3, UOG9ROT, HCO3, NBEA, PBEA, BEART, BE, THGBART, THB, RRG2DYZ, BWMF0CYU, T2GUBLKQ, O2SAT, FIO2    Radiology:    No new radiology reports    Physical Examination:        General appearance:  alert, cooperative and no distress  Mental Status:  oriented to person, [facility] and 1978, and normal affect  Lungs:  clear to auscultation bilaterally, normal effort  Heart:  regular rate and rhythm, no murmur  Abdomen:  soft, nontender, nondistended, normal bowel sounds, no masses, hepatomegaly, splenomegaly  Extremities:  no edema, redness, tenderness in the calves  Skin:  no gross lesions, rashes, induration    Assessment:        Primary Problem  Acute kidney injury [facility])    C/Andre[address]ung [address] Problems    Diagnosis Date Noted    Acute encephalopathy [G93.40] 10/09/2017     Priority: High    Severe protein-calorie malnutrition ([address] [E43] 10/17/2017    Hypercalcemia [E83.52] 10/10/2017    Acute kidney injury ([address] [N17.9] 10/06/2017    Pancytopenia ([address] [D61.818] 10/06/2017    Multiple myeloma ([address] [C90.00] 10/06/2017       Plan:        1. Continue fluids per nephrology  2. Follow labs  3. GI and DVT prophylaxis  4. PT and OT  5. Nutritional support  6. Discharge planning when okay with nephrology, will likely need to skilled nursing facility.     Cricket Jackman DO  10/17/2017  9:55 AM

## 2017-10-17 NOTE — PROGRESS NOTES
Physical Therapy  DATE: 10/17/2017    NAME: Alessandra Rogel  MRN: [id number]   : 1955     10/17/17 1155   Restrictions/Precautions   Restrictions/Precautions General Precautions; Fall Risk   Position Activity Restriction   Other position/activity restrictions Decreased cognition   General   Chart Reviewed Yes   General Comment   Comments pt currently on 1:1 pt has brief moments of clear mindedness but remains confused for a majority of the time   Pain Screening   Patient Currently in Pain Denies   Bed Mobility   Rolling Contact guard assistance   Supine to Sit Contact guard assistance   Sit to Supine Contact guard assistance   Scooting Contact guard assistance   Transfers   Sit to Stand Contact guard assistance   Stand to sit Contact guard assistance   Stand Pivot Transfers Contact guard assistance   Ambulation   Ambulation? Yes   Ambulation 1   Surface level tile   Device (IV pole)   Assistance Contact guard assistance   Quality of Gait shuffled stpes but no LOB    Distance 5 ft   Comments pt perseverating on calling his son   Exercises   Comments LE seated AROM x 15 reps   Short term goals   Short term goal 1 Pt indep with transfers without loss of balance   Short term goal 2 Pt amb 200 ft with least restrictive device and supervision   Short term goal 3 Pt negotiate 4 stairs with 1 rail and SBA   Short term goal 4 Pt improve standing balance to good with support   Conditions Requiring Skilled Therapeutic Intervention   Body structures, Functions, Activity limitations Decreased functional mobility ; Decreased strength;Decreased balance;Decreased endurance   Assessment at times pt less confused but still requires redirection     Discharge Recommendations Continue to assess pending progress;Home with assist PRN;Outpatient PT   Activity Tolerance   Activity Tolerance Patient Tolerated treatment well   Plan   Times per week 1-2x/day   Current Treatment Recommendations Strengthening;Balance Training;Functional Mobility

## 2017-10-17 NOTE — PLAN OF CARE
Problem: Falls - Risk of  Goal: Absence of falls  Outcome: Met This Shift  Patient is fall risk per fall scale. Falling star on door. Fall sticker on armband. Hourly rounding performed. Personal belongings and call light within reach. Bed in low position. Restraint alternatives in place, 1:1 continues for patient safety. Patient up in room with standby assist, gait slow and unsteady at times. Patient free of falls.

## 2017-10-18 NOTE — PROGRESS NOTES
[facility]    Progress Note    10/18/2017    11:56 AM    Name:   Renetta Herman  MRN:     [id number]     Acct:      [de-identified]   Room:   [address] [place] [address] Day:  12  Admit Date:  10/6/2017 10:28 AM    PCP:   Ayanna Perez MD  Code Status:  Full Code    Subjective:     C/C:   Chief Complaint   Patient presents with    Other     voice hoarse slow to respond     Interval History Status: improved. Patient still mildly confused but appropriate. Denies any physical complaints. Brief History: This is a 75-year-old white male was admitted with intermittent confusion and speech problems.  He is found have acute renal failure in the emergency room and has been admitted to the hospital.  He's been treated with IV fluids      Also had hypercalcemia, received calcitonin and 1 dose of zometa     He has a history of multiple myeloma and has been treated with chemotherapy     Encephalopathy has been continuous and has been requiring a sitter for safety    Review of Systems:     Constitutional:  negative for chills, fevers, sweats  Respiratory:  negative for cough, dyspnea on exertion, shortness of breath, wheezing  Cardiovascular:  negative for chest pain, chest pressure/discomfort, lower extremity edema, palpitations  Gastrointestinal:  negative for abdominal pain, constipation, diarrhea, nausea, vomiting  Neurological:  negative for dizziness, headache    Medications:      Allergies:  No Known Allergies    Current Meds:   Scheduled Meds:    senna  2 tablet Oral Daily    metoprolol tartrate  25 mg Oral BID    QUEtiapine  25 mg Oral Nightly    polyethylene glycol  17 g Oral Daily    aspirin  81 mg Oral Daily    oxyCODONE  40 mg Oral Q12H    sodium chloride flush  10 mL Intravenous 2 times per day    famotidine  20 mg Oral Daily    acyclovir  400 mg Oral Daily     Continuous Infusions:    sodium chloride 75 mL/hr at 10/18/17 0641     PRN Meds: magnesium hydroxide, bisacodyl, heparin flush (100 units/mL), LORazepam, albuterol sulfate HFA, oxyCODONE, sodium chloride flush, acetaminophen, ondansetron, diphenhydrAMINE    Data:     Past Medical History:   has a past medical history of Multiple myeloma ([address] and Thoracic or lumbosacral neuritis or radiculitis, unspecified. Social History:   reports that he has never smoked. He has quit using smokeless tobacco. He reports that he drinks alcohol. He reports that he does not use drugs. Family History:   Family History   Problem Relation Age of Onset    Other Father      \"heart issues\"   [facility] Cancer Mother      lung       Vitals:  /62   Pulse 98   Temp 97.7 °F (36.5 °C) (Oral)   Resp 16   Ht 5' 10\" (1.778 m)   Wt 179 lb 1.6 oz (81.2 kg)   SpO2 99%   BMI 25.70 kg/m²   Temp (24hrs), Av.5 °F (36.4 °C), Min:97.3 °F (36.3 °C), Max:97.7 °F (36.5 °C)    No results for input(s): POCGLU in the last 72 hours. I/O (24Hr):     Intake/Output Summary (Last 24 hours) at 10/18/17 1156  Last data filed at 10/18/17 1011   Gross per 24 hour   Intake             2953 ml   Output             1600 ml   Net             1353 ml       Labs:    Hematology:  Recent Labs      10/16/17   0500  10/17/17   0542  10/18/17   0707   WBC  1.6*  2.0*  2.2*   RBC  2.43*  2.52*  2.55*   HGB  7.8*  8.0*  8.1*   HCT  22.5*  23.2*  23.5*   MCV  92.5  92.2  92.0   MCH  32.0  31.8  31.7   MCHC  34.6  34.4  34.5   RDW  14.5  14.8*  14.9*   PLT  38*  41*  43*   MPV  7.4  6.9  6.9     Chemistry:  Recent Labs      10/16/17   0500  10/17/17   0542  10/18/17   0707   NA  144  144  144   K  3.7  3.6*  3.5*   CL  110*  109*  108*   CO2  20  22  22   GLUCOSE  93  93  105*   BUN  25*  25*  27*   CREATININE  2.52*  2.48*  2.45*   MG  1.7  1.9  2.2   ANIONGAP  14  13  14   LABGLOM  26*  26*  27*   GFRAA  31*  32*  32*   CALCIUM  9.4  9.6  9.6     Recent Labs      10/15/17   1535   PROT  6.5   LABALBU  3.8   AST  21   ALT  21   ALKPHOS  69 BILITOT  0.45   BILIDIR  0.16   AMMONIA  46         Lab Results   Component Value Date/Time    SPECIAL NOT REPORTED 02/28/2016 11:34 PM     Lab Results   Component Value Date/Time    CULTURE NO GROWTH 02/28/2016 11:34 PM    CULTURE  02/28/2016 11:34 PM     Performed at [facility], [address] [place] [address] [phone]       No results found for: POCPH, PHART, PH, POCPCO2, AZJ9IMY, PCO2, POCPO2, PO2ART, PO2, POCHCO3, CZT3UWM, HCO3, NBEA, PBEA, BEART, BE, THGBART, THB, NYH1NII, EHPZ4SVK, V2KNYART, O2SAT, FIO2    Radiology:    No new radiology report    Physical Examination:        General appearance:  alert, cooperative and no distress  Mental Status:  oriented to person, place and time and normal affect  Lungs:  clear to auscultation bilaterally, normal effort  Heart:  regular rate and rhythm, no murmur  Abdomen:  soft, nontender, nondistended, normal bowel sounds, no masses, hepatomegaly, splenomegaly  Extremities:  no edema, redness, tenderness in the calves  Skin:  no gross lesions, rashes, induration    Assessment:        Primary Problem  Acute kidney injury [facility])    Active Hospital Problems    Diagnosis Date Noted    Acute encephalopathy [G93.40] 10/09/2017     Priority: High    Severe protein-calorie malnutrition ([address] [E43] 10/17/2017    Hypercalcemia [E83.52] 10/10/2017    Acute kidney injury ([address] [N17.9] 10/06/2017    Pancytopenia ([address] [D61.818] 10/06/2017    Multiple myeloma ([address] [C90.00] 10/06/2017       Plan:        1. Continue present medications  2. PT and OT  3. GI and DVT prophylaxis  4. Outpatient treatment for multiple myeloma  5.  Discharge planning    Maurice Antoine DO  10/18/2017  11:56 AM

## 2017-10-18 NOTE — CARE COORDINATION
SW received call from Savannah Garcia pt RN regarding pt and spouse are refusing SNF, and would like to go home with home health care. SW informed Sanjuanita Pineda RN CM regarding pt and spouse request. Sanjuanita Pineda reports she will meet with this family today.

## 2017-10-18 NOTE — DISCHARGE INSTR - DIET
 Good nutrition is important when healing from an illness, injury, or surgery. Follow any nutrition recommendations given to you during your hospital stay.  If you were given an oral nutrition supplement while in the hospital, continue to take this supplement at home. You can take it with meals, in-between meals, and/or before bedtime. These supplements can be purchased at most local grocery stores, pharmacies, and chain super-stores.  If you have any questions about your diet or nutrition, call the hospital and ask for the dietitian. RENALYou are recommended to follow a renal (kidney) diet    What foods are good to eat? Eat food rich in calories and protein. Meats and proteins like:  Beef  Fish  Poultry  Pork  Eggs  Breads and grains like:  Cereals  Bread  Milk products like: Yogurt  Cream cheese  Ricotta cheese  Butter  Margarine  Heavy cream  Sherbet  Fruits like:  Apples  Berries  Cherries  Grapes  Peaches  Pears  Pineapples  Plums  Veggies like:  Broccoli  Cabbage  Carrots  Cauliflower  Celery  Cucumber  Eggplant  Lettuce  Peppers  Radish  Zucchini  Yellow squash  Talk to your doctor about these foods if you need to control your blood sugar. What foods should be limited or avoided? Stay away from food high in water, potassium, phosphorus, and sodium or salt. If potassium builds up in your blood, it may cause heart problems. Phosphorus, in large amount in your blood, can take away the calcium in your blood. This may weaken your bones. Salt or sodium can trap fluids in your body and cause high blood pressure.  Limit or stay away from eating these foods:  Breads and grains like:  Whole wheat bread  Brown rice  Fruits like:  Oranges  Kiwis  Prunes  Raisins  Bananas  Melons  Veggies like:  Potatoes  Tomatoes  Winter squash  Pumpkin  Asparagus  Avocados  Beets  Spinach  Parsnips  Seasonings like:  Soy sauce  Teriyaki sauce  Other foods and drinks like:  Canned foods  Chips  Restaurant foods  Coffee  Tea  Soda

## 2017-10-18 NOTE — PLAN OF CARE
Problem: Falls - Risk of  Goal: Absence of falls  Outcome: Ongoing  Patient is fall risk per fall scale. Falling star on door. Fall sticker on armband. Hourly rounding performed. Personal belongings and call light within reach. Bed in low position. Restraint alternatives in place, 1:1 continues for patient safety. Patient alert, oriented to person only. Cooperative with care. No attempts to get out of bed per self. Patient free of falls.

## 2017-10-18 NOTE — PROGRESS NOTES
Occupational Therapy  Facility/Department: [facility]  Daily Treatment Note  NAME: Génesis Velasco  : 1955  MRN: [id number]    Date of Service: 10/18/2017    Patient Diagnosis(es): The encounter diagnosis was Acute kidney injury [facility]). has a past medical history of Multiple myeloma ([facility] and Thoracic or lumbosacral neuritis or radiculitis, unspecified. has a past surgical history that includes Kyphosis surgery; bone marrow transplant (2013); Knee arthroscopy (Bilateral); Shoulder arthroscopy (Bilateral); and Tunneled venous port placement. Restrictions  Restrictions/Precautions  Restrictions/Precautions: General Precautions, Fall Risk  Required Braces or Orthoses?: No  Position Activity Restriction  Other position/activity restrictions: Confusion  Subjective   General  Chart Reviewed: Yes  Patient assessed for rehabilitation services?: Yes  Response to previous treatment: Patient with no complaints from previous session  Family / Caregiver Present: Yes (Telesitter)  Pain Assessment  Patient Currently in Pain: Denies  Vital Signs  Patient Currently in Pain: Denies   Orientation  Orientation  Overall Orientation Status: Impaired  Orientation Level: Oriented to person;Disoriented to place; Disoriented to time;Disoriented to situation  Objective    ADL  Grooming: Verbal cueing;Minimal assistance;Setup  UE Bathing: Moderate assistance  LE Bathing: Moderate assistance  UE Dressing: Moderate assistance  LE Dressing: Maximum assistance  Toileting:  Moderate assistance        Balance  Sitting Balance: Stand by assistance  Standing Balance: Contact guard assistance  Standing Balance  Time: 3-4 mins  Activity: Functional mobility in room   Sit to stand: Contact guard assistance  Stand to sit: Contact guard assistance  Functional Mobility  Functional - Mobility Device: No device  Activity: To/from bathroom  Assist Level: Contact guard assistance  Functional Mobility Comments: VC for safety and mobility  Bed mobility  Sit to Supine: Contact guard assistance  Transfers  Stand Step Transfers: Contact guard assistance  Sit to stand: Contact guard assistance  Stand to sit: Contact guard assistance     LUE AROM (degrees)  LUE AROM : WFL  Left Hand AROM (degrees)  Left Hand AROM: WFL  RUE AROM (degrees)  RUE AROM : WFL  Right Hand AROM (degrees)  Right Hand AROM: WFL     Assessment   Performance deficits / Impairments: Decreased functional mobility ; Decreased ADL status; Decreased safe awareness;Decreased cognition;Decreased endurance;Decreased balance;Decreased high-level IADLs  Treatment Diagnosis: multiple myeloma and intermittent confusion  Prognosis: Good  Decision Making: Medium Complexity  Patient Education: Educ on safe transfer techniques  Barriers to Learning: Confusion  Discharge Recommendations: Subacute/Skilled Nursing Facility  REQUIRES OT FOLLOW UP: Yes  Activity Tolerance  Activity Tolerance: Patient Tolerated treatment well  Safety Devices  Safety Devices in place: Yes  Type of devices: Nurse notified; Left in bed;Bed alarm in place; All fall risk precautions in place  Restraints  Initially in place: No       Discharge Recommendations:  [address]  Times per week: 4-5x  Current Treatment Recommendations: Self-Care / ADL, Cognitive/Perceptual Training, Safety Education & Training, Cognitive Reorientation, Balance Training  G-Code     OutComes Score     Goals  Short term goals  Time Frame for Short term goals: Pt will, before discharge  Short term goal 1: demo Ox3 with MIN VC  Short term goal 2: tolerate >10min seated ADL/IADL tasks with supervision and no LOB  Short term goal 3: demo UB and LB self-care with setup assist and supervision  Short term goal 4: demo functional mobility to/from bathroom with ae/ad and safety awareness with SBA       Therapy Time   Individual Concurrent Group Co-treatment   Time In  1518         Time Out  1546         Minutes  28

## 2017-10-18 NOTE — PROGRESS NOTES
Date:                           10/18/2017  Patient name:           Gi Medel  Date of admission:  10/6/2017 10:28 AM  MRN:   [id number]  YOB: 1955  PCP:                           Mariah Clancy MD      Subjective:       Patient seen and examined. Is much clearer today. Alert and oriented ×2  Overall condition is fairly stable. No fever or chills  REVIEW OF SYSTEMS:  Pt denies all symptoms. Objective:     Vitals: /62   Pulse 98   Temp 97.7 °F (36.5 °C) (Oral)   Resp 16   Ht 5' 10\" (1.778 m)   Wt 179 lb 1.6 oz (81.2 kg)   SpO2 99%   BMI 25.70 kg/m²   General appearance - Not in pain or distress  Mental status - mildly confused,   Eyes - pupils equal and reactive, extraocular eye movements intact  Ears - bilateral TM's and external ear canals normal  Nose - normal and patent, no erythema, discharge or polyps  Mouth - mucous membranes moist, pharynx normal without lesions. Hoarseness of voice. Neck - supple, no significant adenopathy  Lymphatics - no palpable lymphadenopathy, no hepatosplenomegaly  Chest - clear to auscultation, no wheezes, rales or rhonchi, symmetric air entry  Heart - normal rate, regular rhythm, normal S1, S2, no murmurs, rubs, clicks or gallops  Abdomen - soft, nontender, nondistended, no masses or organomegaly  Neurological - Confused  Musculoskeletal - no joint tenderness, deformity or swelling  Extremities - peripheral pulses normal, no pedal edema, no clubbing or cyanosis  Skin - normal coloration and turgor, no rashes, no suspicious skin lesions noted           Data:    No intake/output data recorded. In: 2053 [P.O.:200;  I.V.:1853]  Out: 1425 [Urine:1425]    CBC:   Recent Labs      10/16/17   0500  10/17/17   0542  10/18/17   0707   WBC  1.6*  2.0*  2.2*   HGB  7.8*  8.0*  8.1*   PLT  38*  41*  43*     BMP:    Recent Labs      10/16/17   0500  10/17/17   0542  10/18/17   0707   NA  144  144  144   K  3.7  3.6*  3.5*   CL  110*  FUO (fever of unknown origin)    Bronchitis    Acute encephalopathy    Thrombocytopenia (HCC)    Acute kidney injury ([address]    Pancytopenia ([address]    Multiple myeloma ([address]    Hypercalcemia    Severe protein-calorie malnutrition ([address]     Plan     I had a long discussion with the patient, overall condition has been stable. His mental status is not fully back to normal but it's very close to baseline. He still has nominal aphasia. The plan is to discharge him to a nursing home and then consider restarting medication. I'm still not sure about the cause of his mental status changes in the workup so far has not been fruitful. I'm suspicious that he might had small cortical infarction.   Repeat MRI might BE helpful down the line      [address] Nishant Herrera MD  Hematologist/Medical Oncologist  Cell: [phone]

## 2017-10-18 NOTE — PROGRESS NOTES
MAGNESIA) 400 MG/5ML suspension 30 mL Daily PRN   senna (SENOKOT) tablet 17.2 mg Daily   bisacodyl (DULCOLAX) suppository 10 mg Daily PRN   heparin flush (100 units/mL) injection 100 Units PRN   metoprolol tartrate (LOPRESSOR) tablet 25 mg BID   QUEtiapine (SEROQUEL) tablet 25 mg Nightly   0.45 % sodium chloride infusion Continuous   LORazepam (ATIVAN) injection 0.5 mg Q6H PRN   polyethylene glycol (GLYCOLAX) packet 17 g Daily   aspirin chewable tablet 81 mg Daily   albuterol sulfate  (90 Base) MCG/ACT inhaler 2 puff Q6H PRN   oxyCODONE (OXYCONTIN) extended release tablet 40 mg Q12H   oxyCODONE (ROXICODONE) immediate release tablet 5 mg Q4H PRN   sodium chloride flush 0.9 % injection 10 mL 2 times per day   sodium chloride flush 0.9 % injection 10 mL PRN   acetaminophen (TYLENOL) tablet 650 mg Q4H PRN   ondansetron (ZOFRAN) injection 4 mg Q6H PRN   famotidine (PEPCID) tablet 20 mg Daily   acyclovir (ZOVIRAX) capsule 400 mg Daily   diphenhydrAMINE (BENADRYL) tablet 25 mg Nightly PRN         LABS      CBC: Recent Labs      10/16/17   0500  10/17/17   0542  10/18/17   0707   WBC  1.6*  2.0*  2.2*   RBC  2.43*  2.52*  2.55*   HGB  7.8*  8.0*  8.1*   HCT  22.5*  23.2*  23.5*   MCV  92.5  92.2  92.0   MCH  32.0  31.8  31.7   MCHC  34.6  34.4  34.5   RDW  14.5  14.8*  14.9*   PLT  38*  41*  43*   MPV  7.4  6.9  6.9      BMP: Recent Labs      10/16/17   0500  10/17/17   0542  10/18/17   0707   NA  144  144  144   K  3.7  3.6*  3.5*   CL  110*  109*  108*   CO2  20  22  22   BUN  25*  25*  27*   CREATININE  2.52*  2.48*  2.45*   GLUCOSE  93  93  105*   CALCIUM  9.4  9.6  9.6      MAGNESIUM:   Recent Labs      10/16/17   0500  10/17/17   0542  10/18/17   0707   MG  1.7  1.9  2.2     ALBUMIN:   Recent Labs      10/15/17   1535   LABALBU  3.8     SPEP: Lab Results   Component Value Date    PROT 6.5 10/15/2017    ALBCAL 3.6 10/13/2017    ALBPCT 63 10/13/2017    LABALPH 0.2 10/13/2017    LABALPH 0.9 10/13/2017    A1PCT 4 10/13/2017    A2PCT 16 10/13/2017    LABBETA 0.6 10/13/2017    BETAPCT 11 10/13/2017    GAMGLOB 0.4 10/13/2017    GGPCT 7 10/13/2017    PATH ELECTRONICALLY SIGNED. Ronan Baird M.D. 10/13/2017    PATH ELECTRONICALLY SIGNED. Ronan Baird M.D. 10/13/2017     UPEP:   Lab Results   Component Value Date    TPU 95 10/09/2017    TPU 95 10/09/2017       ASSESSMENT      1. SHEKHAR :Secondary primarily to hypercalcemia with gradual recovery  2. HTN:  3.  Multiple myeloma   4. Hypercalcemia: Improving  5. Hypernatremia: Resolved    PLAN      1. Suggest patient go to ECF rather than home   2. Encourage by mouth and gradually wean fluids off  3. Follow up labs ordered. 4. Following along       Please do not hesitate to call with questions.     Electronically signed by Missy Samuel MD on 10/18/2017 at 1:55 PM

## 2017-10-19 NOTE — PLAN OF CARE
[facility]    Second Visit Note  For more detailed information please refer to the progress note of the day      10/19/2017    2:48 PM    Name:   Alessandra Rogel  MRN:     [id number]     Acct:      [de-identified]   Room:   1009/1009-02   Day:  15  Admit Date:  10/6/2017 10:28 AM    PCP:   Brandon Patino MD  Code Status:  Full Code        Pt vitals were reviewed   New labs were reviewed   Patient was seen    Updated plan :     1. Patient remains confused, awaiting precertification for skilled nursing facility.   2. Oncology called [facility] for transfer which was declined by the receiving facility        Broderick Hamman, DO  10/19/2017  2:48 PM

## 2017-10-19 NOTE — PROGRESS NOTES
Date:                           10/19/2017  Patient name:           Kathy Shen  Date of admission:  10/6/2017 10:28 AM  MRN:   [id number]  YOB: 1955  PCP:                           Irma Mancia MD      Subjective:       Patient seen and examined. He is still confused as yesterday. No change in his overall condition  Family are interested in transfer to [facility] as he is well known to the oncology team there. REVIEW OF SYSTEMS:  Pt denies all symptoms. Objective:     Vitals: BP (!) 145/76   Pulse 92   Temp 97.9 °F (36.6 °C) (Oral)   Resp 18   Ht 5' 10\" (1.778 m)   Wt 179 lb 1.6 oz (81.2 kg)   SpO2 98%   BMI 25.70 kg/m²   General appearance - Not in pain or distress  Mental status - mildly confused,   Eyes - pupils equal and reactive, extraocular eye movements intact  Ears - bilateral TM's and external ear canals normal  Nose - normal and patent, no erythema, discharge or polyps  Mouth - mucous membranes moist, pharynx normal without lesions. Hoarseness of voice.   Neck - supple, no significant adenopathy  Lymphatics - no palpable lymphadenopathy, no hepatosplenomegaly  Chest - clear to auscultation, no wheezes, rales or rhonchi, symmetric air entry  Heart - normal rate, regular rhythm, normal S1, S2, no murmurs, rubs, clicks or gallops  Abdomen - soft, nontender, nondistended, no masses or organomegaly  Neurological - Confused  Musculoskeletal - no joint tenderness, deformity or swelling  Extremities - peripheral pulses normal, no pedal edema, no clubbing or cyanosis  Skin - normal coloration and turgor, no rashes, no suspicious skin lesions noted           Data:    I/O this shift:  In: 367 [Blood:367]  Out: -   In: 367 [Blood:367]  Out: 1250 [Urine:1250]    CBC:   Recent Labs      10/17/17   0542  10/18/17   0707  10/19/17   0616   WBC  2.0*  2.2*  3.2*   HGB  8.0*  8.1*  6.6*   PLT  41*  43*  48*     BMP:    Recent Labs      10/17/17   0542

## 2017-10-19 NOTE — PROGRESS NOTES
suppository 10 mg Daily PRN   heparin flush (100 units/mL) injection 100 Units PRN   metoprolol tartrate (LOPRESSOR) tablet 25 mg BID   QUEtiapine (SEROQUEL) tablet 25 mg Nightly   0.45 % sodium chloride infusion Continuous   LORazepam (ATIVAN) injection 0.5 mg Q6H PRN   polyethylene glycol (GLYCOLAX) packet 17 g Daily   aspirin chewable tablet 81 mg Daily   albuterol sulfate  (90 Base) MCG/ACT inhaler 2 puff Q6H PRN   oxyCODONE (OXYCONTIN) extended release tablet 40 mg Q12H   oxyCODONE (ROXICODONE) immediate release tablet 5 mg Q4H PRN   sodium chloride flush 0.9 % injection 10 mL 2 times per day   sodium chloride flush 0.9 % injection 10 mL PRN   acetaminophen (TYLENOL) tablet 650 mg Q4H PRN   ondansetron (ZOFRAN) injection 4 mg Q6H PRN   famotidine (PEPCID) tablet 20 mg Daily   acyclovir (ZOVIRAX) capsule 400 mg Daily   diphenhydrAMINE (BENADRYL) tablet 25 mg Nightly PRN         LABS      CBC: Recent Labs      10/17/17   0542  10/18/17   0707  10/19/17   0616   WBC  2.0*  2.2*  3.2*   RBC  2.52*  2.55*  2.09*   HGB  8.0*  8.1*  6.6*   HCT  23.2*  23.5*  19.1*   MCV  92.2  92.0  91.3   MCH  31.8  31.7  31.6   MCHC  34.4  34.5  34.6   RDW  14.8*  14.9*  14.0   PLT  41*  43*  48*   MPV  6.9  6.9  NOT REPORTED      BMP: Recent Labs      10/17/17   0542  10/18/17   0707  10/19/17   0616   NA  144  144  144   K  3.6*  3.5*  3.6*   CL  109*  108*  107   CO2  22  22  22   BUN  25*  27*  25*   CREATININE  2.48*  2.45*  2.54*   GLUCOSE  93  105*  106*   CALCIUM  9.6  9.6  9.5      BNP:No results found for: BNP  PHOSPHORUS:  No results for input(s): PHOS in the last 72 hours.   MAGNESIUM:   Recent Labs      10/17/17   0542  10/18/17   0707  10/19/17   0616   MG  1.9  2.2  2.2       SPEP: Lab Results   Component Value Date    PROT 6.5 10/15/2017    ALBCAL 3.6 10/13/2017    ALBPCT 63 10/13/2017    LABALPH 0.2 10/13/2017    LABALPH 0.9 10/13/2017    A1PCT 4 10/13/2017    A2PCT 16 10/13/2017    LABBETA 0.6 10/13/2017

## 2017-10-19 NOTE — CARE COORDINATION
TRINA received call from [facility] with [facility] regarding referral was received and has been reviewed. Pt is on a medication called Pomalidomide that is very expensive 22,000 a month. [facility] cannot accept pt on this medication. [facility] will need to complete an onsite before a decision can be made. TRINA will inform Josefina Niocle regarding the expensive medication. TRINA spoke with [facility] regarding referral was received and they do not have any male beds at this time. TRINA spoke with [address] [place] [address] regarding referral, [place] reports the referral was not received, TRINA re-faxed the referral.      TRINA informed Juan Bermudez RN regarding the expensive medication.

## 2017-10-19 NOTE — PROGRESS NOTES
Occupational Therapy  Facility/Department: [facility] PROGRESSIVE CARE  Daily Treatment Note  NAME: Analilia White  : 1955  MRN: [id number]     RN Dalton Stover reports patient is medically stable for EOB therapy treatment this date. Chart reviewed prior to treatment and patient is agreeable for therapy. All lines intact and patient positioned comfortably at end of treatment. All patient needs addressed prior to ending therapy session. Date of Service: 10/19/2017    Patient Diagnosis(es): The encounter diagnosis was Acute kidney injury [facility]). has a past medical history of Multiple myeloma ([facility] [address] and Thoracic or lumbosacral neuritis or radiculitis, unspecified. has a past surgical history that includes Kyphosis surgery; bone marrow transplant (2013); Knee arthroscopy (Bilateral); Shoulder arthroscopy (Bilateral); and Tunneled venous port placement. Restrictions  Restrictions/Precautions  Restrictions/Precautions: General Precautions, Fall Risk  Required Braces or Orthoses?: No  Position Activity Restriction  Other position/activity restrictions: Confusion, telesitter active  Subjective   General  Chart Reviewed: No  Patient assessed for rehabilitation services?: Yes  Response to previous treatment: Patient with no complaints from previous session  Family / Caregiver Present: No (telesitter active)  Pain Assessment  Patient Currently in Pain: Denies  Vital Signs  Patient Currently in Pain: Denies   Orientation  Orientation  Overall Orientation Status: Impaired  Orientation Level: Oriented to person;Disoriented to place; Disoriented to time;Disoriented to situation  Objective             Balance  Sitting Balance: Supervision  Bed mobility  Rolling to Left: Stand by assistance  Supine to Sit: Contact guard assistance  Sit to Supine: Stand by assistance  Scooting: Stand by assistance  Comment: Pt impulsive to stand during seated activity, but able to remain seated with MIN VC        Cognition  Overall Cognitive Status: Exceptions  Arousal/Alertness: Delayed responses to stimuli;Inconsistent responses to stimuli  Following Commands: Inconsistently follows commands  Attention Span: Attends with cues to redirect; Difficulty attending to directions  Memory: Appears intact  Safety Judgement: Decreased awareness of need for assistance;Decreased awareness of need for safety  Problem Solving: Assistance required to correct errors made;Assistance required to identify errors made;Assistance required to implement solutions  Insights: Not aware of deficits  Initiation: Requires cues for all  Sequencing: Requires cues for all        Assessment   Performance deficits / Impairments: Decreased functional mobility ; Decreased ADL status; Decreased safe awareness;Decreased cognition;Decreased endurance;Decreased balance;Decreased high-level IADLs  Assessment: Pt supine in bed upon arrival. RN Tucker Rebollar activity seated EOB. PT rolled-left, supine-sit, dynamic sitting activity, sit-supine, scoot, with assist level listed above. Pt demo ability to maintain calm-alert state entirety of session. Pt completed ~10 minutes of dynamic seated activity to catch and throw large, soft object with control and moving target in order to improve attention and motor planning necessary for ADL tasks. Pt supine in bed with call light in reach and bed alarm active upon exit. Treatment Diagnosis: multiple myeloma and intermittent confusion  Patient Education: orientation to situation, Educ on safe transfer techniques  Discharge Recommendations: Subacute/Skilled Nursing Facility  REQUIRES OT FOLLOW UP: Yes  Activity Tolerance  Activity Tolerance: Patient Tolerated treatment well  Safety Devices  Safety Devices in place: Yes  Type of devices: Nurse notified; Left in bed;Bed alarm in place; All fall risk precautions in place       Discharge Recommendations:  [facility]  Times per week: 4-5x  Current Treatment

## 2017-10-19 NOTE — PROGRESS NOTES
Extensive teaching completed at bedside with patient and patient's wife Jessica Rose. [facility], printed, and reviewed with Katie Tobar keeping packet. Packet includes teaching on: cortical infarction, hypercalcemia, pancytopenia, encephalopathy, renal failure, neutropenia, blood transfusion, hemoglobin. Patient requested to see patient advocate for possible second opinion. Dr. Wandy Kenny discussed possible transfer to [facility] in [place] with patient and wife. [facility] declined transfer.

## 2017-10-19 NOTE — PLAN OF CARE
Problem: Falls - Risk of  Goal: Absence of falls  Outcome: Ongoing      Problem: Pain:  Goal: Control of chronic pain  Control of chronic pain   Outcome: Ongoing      Problem: Safety:  Goal: Free from accidental physical injury  Free from accidental physical injury   Outcome: Ongoing

## 2017-10-19 NOTE — PROGRESS NOTES
Spoke with pt wife and  and pt wife would like pt transferred to another facility for second opinion for his mentation, Dr. Bolden Sentkaela and bedside and said that is would be best to transfer pt to [address] [place] [address] in [facility] where his cancer doctor Dr. Quynh Carolina goes, process started and multiple phone calls to admission line with pt information, Dr. Kendrick Gay spoke with hospitalist and Dr. Geraldine Rivera and they denied pt for admission,  informed and she will continue to work on [facility]

## 2017-10-19 NOTE — PROGRESS NOTES
[id number] [facility] [place]    Progress Note    10/19/2017    7:53 AM    Name:   Dean Lira  MRN:     [id number]     Acct:      [de-identified]   Room:   [address] [place] [address] Day:  15  Admit Date:  10/6/2017 10:28 AM    PCP:   Lyndon Bernabe MD  Code Status:  Full Code    Subjective:     C/C:   Chief Complaint   Patient presents with    Other     voice hoarse slow to respond     Interval History Status: improved. Patient remains confused and without acute complaints. Awaiting family decision regarding skilled nursing facility options    Brief History: This is a 77-year-old white male was admitted with intermittent confusion and speech problems.  He is found have acute renal failure in the emergency room and has been admitted to the hospital.  He's been treated with IV fluids      Also had hypercalcemia, received calcitonin and 1 dose of zometa     He has a history of multiple myeloma and has been treated with chemotherapy     Encephalopathy has been continuous and has been requiring a sitter for safety    Review of Systems:     Constitutional:  negative for chills, fevers, sweats  Respiratory:  negative for cough, dyspnea on exertion, shortness of breath, wheezing  Cardiovascular:  negative for chest pain, chest pressure/discomfort, lower extremity edema, palpitations  Gastrointestinal:  negative for abdominal pain, constipation, diarrhea, nausea, vomiting  Neurological:  negative for dizziness, headache    Medications:      Allergies:  No Known Allergies    Current Meds:   Scheduled Meds:    sodium chloride  250 mL Intravenous Once    senna  2 tablet Oral Daily    metoprolol tartrate  25 mg Oral BID    QUEtiapine  25 mg Oral Nightly    polyethylene glycol  17 g Oral Daily    aspirin  81 mg Oral Daily    oxyCODONE  40 mg Oral Q12H    sodium chloride flush  10 mL Intravenous 2 times per day    famotidine  20 mg Oral Daily    acyclovir  400 mg Oral Daily Continuous Infusions:    sodium chloride 50 mL/hr at 10/18/17 2208     PRN Meds: magnesium hydroxide, bisacodyl, heparin flush (100 units/mL), LORazepam, albuterol sulfate HFA, oxyCODONE, sodium chloride flush, acetaminophen, ondansetron, diphenhydrAMINE    Data:     Past Medical History:   has a past medical history of Multiple myeloma ([address] and Thoracic or lumbosacral neuritis or radiculitis, unspecified. Social History:   reports that he has never smoked. He has quit using smokeless tobacco. He reports that he drinks alcohol. He reports that he does not use drugs. Family History:   Family History   Problem Relation Age of Onset    Other Father      \"heart issues\"   Js Re Cancer Mother      lung       Vitals:  /68   Pulse 92   Temp 97.3 °F (36.3 °C) (Oral)   Resp 16   Ht 5' 10\" (1.778 m)   Wt 179 lb 1.6 oz (81.2 kg)   SpO2 96%   BMI 25.70 kg/m²   Temp (24hrs), Av.6 °F (36.4 °C), Min:97.3 °F (36.3 °C), Max:97.9 °F (36.6 °C)    No results for input(s): POCGLU in the last 72 hours. I/O (24Hr):     Intake/Output Summary (Last 24 hours) at 10/19/17 0753  Last data filed at 10/19/17 0546   Gross per 24 hour   Intake              200 ml   Output             2375 ml   Net            -2175 ml       Labs:    Hematology:  Recent Labs      10/17/17   0542  10/18/17   0707  10/19/17   0616   WBC  2.0*  2.2*  3.2*   RBC  2.52*  2.55*  2.09*   HGB  8.0*  8.1*  6.6*   HCT  23.2*  23.5*  19.1*   MCV  92.2  92.0  91.3   MCH  31.8  31.7  31.6   MCHC  34.4  34.5  34.6   RDW  14.8*  14.9*  14.0   PLT  41*  43*  48*   MPV  6.9  6.9  NOT REPORTED     Chemistry:  Recent Labs      10/17/17   0542  10/18/17   0707  10/19/17   0616   NA  144  144  144   K  3.6*  3.5*  3.6*   CL  109*  108*  107   CO2  22  22  22   GLUCOSE  93  105*  106*   BUN  25*  27*  25*   CREATININE  2.48*  2.45*  2.54*   MG  1.9  2.2  2.2   ANIONGAP  13  14  15   LABGLOM  26*  27*  26*   GFRAA  32*  32*  31*   CALCIUM  9.6  9.6  9.5     No DO Augustina  10/19/2017  7:53 AM

## 2017-10-19 NOTE — CARE COORDINATION
TRINA spoke with Caty Fall and Avtar Kirby regarding the expensive medication, both nurses informed TRINA the physician reports the medication will be on hold until after rehab. TRINA inform Zeb and [address] [place] [address] will complete an onsite tomorrow.

## 2017-10-19 NOTE — PLAN OF CARE
Problem: Falls - Risk of  Intervention: Toileting assistance  Restroom assistance with hourly rounding and as needed. Goal: Absence of falls  Outcome: Ongoing  Patient is fall risk per fall scale. Falling star on door. Fall sticker on armband. Hourly rounding performed. Personal belongings and call light within reach. Bed locked  in low position. Bed alarm on. Tele-sitter in place. Will continue hourly rounding.

## 2017-10-20 NOTE — CARE COORDINATION
TRINA spoke with Vickie Yusuf from [facility] insurance regarding the required information for insurance pre-cert. Vickie Yusuf can be reached at [phone] and fax the information to [phone]. Vickie Yusuf will fax SW forms to be completed and faxed back.

## 2017-10-20 NOTE — CARE COORDINATION
TRINA received call from [facility] regarding this facility is full and not able to accept pt. TRINA spoke with Jorge cMconnell at [facility] regarding pt has been accepted. Insurance pre-cert for this insurance company will need to be obtained by the hospital TRINA.    TRINA attempting to contact [facility] insurance to work on insurance pre-cert.

## 2017-10-20 NOTE — CARE COORDINATION
Social work: spoke to wife and asked if she would like to have an ambulance take pt to snf which was rN suggestion due to behaviors. Wife did agree to have [facility] call [facility] to transport jose at 7:15 pm.  Wife will meet there. She requested two discharge packets one for snf and one for her to provide to oncologists in [address] [place] [address] when he goes up there.   Blanca sarah

## 2017-10-20 NOTE — PROGRESS NOTES
[facility]    Progress Note    10/20/2017    6:33 AM    Name:   Gunjan Merritt  MRN:     [id number]     Acct:      [de-identified]   Room:   [address] [place] [address] Day:  15  Admit Date:  10/6/2017 10:28 AM    PCP:   Elizabeth Blake MD  Code Status:  Full Code    Subjective:     C/C:   Chief Complaint   Patient presents with    Other     voice hoarse slow to respond     Interval History Status: improved. Patient has no acute complaints, mildly confused. Denies chest pain, shortness of breath, nausea or vomiting    Brief History: This is a 78-year-old white male was admitted with intermittent confusion and speech problems.  He is found have acute renal failure in the emergency room and has been admitted to the hospital.  He's been treated with IV fluids      Also had hypercalcemia, received calcitonin and 1 dose of zometa     He has a history of multiple myeloma and has been treated with chemotherapy     Encephalopathy has been continuous and has been requiring a sitter for safety    Review of Systems:     Constitutional:  negative for chills, fevers, sweats  Respiratory:  negative for cough, dyspnea on exertion, shortness of breath, wheezing  Cardiovascular:  negative for chest pain, chest pressure/discomfort, lower extremity edema, palpitations  Gastrointestinal:  negative for abdominal pain, constipation, diarrhea, nausea, vomiting  Neurological:  negative for dizziness, headache    Medications:      Allergies:  No Known Allergies    Current Meds:   Scheduled Meds:    senna  2 tablet Oral Daily    metoprolol tartrate  25 mg Oral BID    QUEtiapine  25 mg Oral Nightly    polyethylene glycol  17 g Oral Daily    aspirin  81 mg Oral Daily    oxyCODONE  40 mg Oral Q12H    sodium chloride flush  10 mL Intravenous 2 times per day    famotidine  20 mg Oral Daily    acyclovir  400 mg Oral Daily     Continuous Infusions:    sodium chloride 50 mL/hr at 10/19/17 1725     PRN Meds: magnesium hydroxide, bisacodyl, heparin flush (100 units/mL), LORazepam, albuterol sulfate HFA, oxyCODONE, sodium chloride flush, acetaminophen, ondansetron, diphenhydrAMINE    Data:     Past Medical History:   has a past medical history of Multiple myeloma ([address] and Thoracic or lumbosacral neuritis or radiculitis, unspecified. Social History:   reports that he has never smoked. He has quit using smokeless tobacco. He reports that he drinks alcohol. He reports that he does not use drugs. Family History:   Family History   Problem Relation Age of Onset    Other Father      \"heart issues\"   Orien Haus Cancer Mother      lung       Vitals:  BP (!) 151/72   Pulse 78   Temp 97.9 °F (36.6 °C) (Oral)   Resp 18   Ht 5' 10\" (1.778 m)   Wt 187 lb (84.8 kg)   SpO2 99%   BMI 26.83 kg/m²   Temp (24hrs), Av °F (36.7 °C), Min:97.9 °F (36.6 °C), Max:98.1 °F (36.7 °C)    No results for input(s): POCGLU in the last 72 hours. I/O (24Hr):     Intake/Output Summary (Last 24 hours) at 10/20/17 0633  Last data filed at 10/20/17 0525   Gross per 24 hour   Intake           1719.5 ml   Output             1200 ml   Net            519.5 ml       Labs:    Hematology:  Recent Labs      10/18/17   0707  10/19/17   0616  10/19/17   1647  10/20/17   0456   WBC  2.2*  3.2*   --   3.2*   RBC  2.55*  2.09*   --   2.77*   HGB  8.1*  6.6*  9.1*  8.6*   HCT  23.5*  19.1*  26.4*  25.6*   MCV  92.0  91.3   --   92.1   MCH  31.7  31.6   --   31.1   MCHC  34.5  34.6   --   33.8   RDW  14.9*  14.0   --   13.8   PLT  43*  48*   --   46*   MPV  6.9  NOT REPORTED   --   NOT REPORTED     Chemistry:  Recent Labs      10/18/17   0707  10/19/17   0616  10/20/17   0456   NA  144  144  144   K  3.5*  3.6*  3.9   CL  108*  107  105   CO2  22  22  22   GLUCOSE  105*  106*  102*   BUN  27*  25*  26*   CREATININE  2.45*  2.54*  2.63*   MG  2.2  2.2  2.1   ANIONGAP  14  15  17   LABGLOM  27*  26*  25*   GFRAA  32*  31*  30*   CALCIUM 9.6  9.5  9.6     No results for input(s): PROT, LABALBU, LABA1C, F9PVZLZ, P3ANLNX, FT4, TSH, AST, ALT, LDH, GGT, ALKPHOS, LABGGT, BILITOT, BILIDIR, AMMONIA, AMYLASE, LIPASE, LACTATE, CHOL, HDL, LDLCHOLESTEROL, CHOLHDLRATIO, TRIG, VLDL, VZK34AG, PHENYTOIN, PHENYF, URICACID, POCGLU in the last 72 hours. Lab Results   Component Value Date/Time    SPECIAL NOT REPORTED 02/28/2016 11:34 PM     Lab Results   Component Value Date/Time    CULTURE NO GROWTH 02/28/2016 11:34 PM    CULTURE  02/28/2016 11:34 PM     Performed at [facility], [address] [place] [address] [phone]       No results found for: POCPH, PHART, PH, POCPCO2, DJD4ETI, PCO2, POCPO2, PO2ART, PO2, POCHCO3, WBW4GYU, HCO3, NBEA, PBEA, BEART, BE, THGBART, THB, YOR3QKU, VVMF8HOE, B0HUEEEV, O2SAT, FIO2    Radiology:    No new radiology report    Physical Examination:        General appearance:  alert, cooperative and no distress  Mental Status:  oriented to person, place and 1972, changes to 1998 and normal affect  Lungs:  clear to auscultation bilaterally, normal effort  Heart:  regular rate and rhythm, no murmur  Abdomen:  soft, nontender, nondistended, normal bowel sounds, no masses, hepatomegaly, splenomegaly  Extremities:  no edema, redness, tenderness in the calves  Skin:  no gross lesions, rashes, induration    Assessment:        Primary Problem  Acute kidney injury [facility])    Active Hospital Problems    Diagnosis Date Noted    Acute encephalopathy [G93.40] 10/09/2017     Priority: High    Severe protein-calorie malnutrition ([facility] [address] [E43] 10/17/2017    Hypercalcemia [E83.52] 10/10/2017    Acute kidney injury ([facility] [address] [N17.9] 10/06/2017    Pancytopenia ([facility] [address] [D61.818] 10/06/2017    Multiple myeloma ([facility] [address] [C90.00] 10/06/2017       Plan:        1. Continue present medications  2. Activity as tolerated  3. PT and OT  4.  Discharge to skilled nursing facility pending precertification    Yokasta Moore DO  10/20/2017  6:33 AM

## 2017-10-20 NOTE — PROGRESS NOTES
Nephrology Progress Note      SUBJECTIVE       Pt was seen and examined. No acute issues overnite. Stable hemodynamics . Long discussion with patient, wife and pt . Waiting to hear from [facility] regarding transfer. His serum creatinine is stable at 2.6, his calcium is stable at 9.5. His oral intake is still plus minus, eats better when encouraged in presence of family  Mental status is  overall improved    OBJECTIVE      CURRENT TEMPERATURE:  Temp: 98.1 °F (36.7 °C)  MAXIMUM TEMPERATURE OVER 24HRS:  Temp (24hrs), Av °F (36.7 °C), Min:97.9 °F (36.6 °C), Max:98.1 °F (36.7 °C)    CURRENT RESPIRATORY RATE:  Resp: 20  CURRENT PULSE:  Pulse: 104  CURRENT BLOOD PRESSURE:  BP: (!) 144/76  24HR BLOOD PRESSURE RANGE:  Systolic (15YZQ), VNA:084 , Min:125 , UDA:859   ; Diastolic (51UIA), LVF:71, Min:63, Max:76    24HR INTAKE/OUTPUT:      Intake/Output Summary (Last 24 hours) at 10/20/17 1141  Last data filed at 10/20/17 1024   Gross per 24 hour   Intake           1719.5 ml   Output             1600 ml   Net            119.5 ml     WEIGHT :  Patient Vitals for the past 96 hrs (Last 3 readings):   Weight   10/20/17 0445 187 lb (84.8 kg)     PHYSICAL EXAM      GENERAL APPEARANCE:Awake, alert, in no acute distress  SKIN: warm and dry, no rash or erythema  EYES: conjunctivae normal and sclera anicteric  ENT: no thrush no pharyngeal congestion   NECK:   No JVD. No carotid bruits and no carotid lymphadenopathy . PULMONARY: lungs are clear to auscultation. No Wheezing, no ronchi . CADRDIOVASCULAR: S1 and S2 normal NO S3 and NO S4 . No rubs , no murmur. ABDOMEN: soft nontender, bowel sounds present, no organomegaly, no ascites.        EXTREMITIES: no cyanosis, clubbing or edema     CURRENT MEDICATIONS        magnesium hydroxide (MILK OF MAGNESIA) 400 MG/5ML suspension 30 mL Daily PRN   senna (SENOKOT) tablet 17.2 mg Daily   bisacodyl (DULCOLAX) suppository 10 mg Daily PRN   heparin flush (100 units/mL) injection PATH ELECTRONICALLY SIGNED. Logan Holland M.D. 10/13/2017    PATH ELECTRONICALLY SIGNED. Logan Holland M.D. 10/13/2017     UPEP:   Lab Results   Component Value Date    TPU 95 10/09/2017    TPU 95 10/09/2017      HURINE SODIUM:    Lab Results   Component Value Date    KECIA 51 10/06/2017      URINE CREATININE:    Lab Results   Component Value Date    LABCREA 52.9 10/07/2017     URINE PROTEIN:    Lab Results   Component Value Date    TPU 95 10/09/2017    TPU 95 10/09/2017     URINALYSIS:  U/A:   Lab Results   Component Value Date    NITRU NEGATIVE 10/06/2017    COLORU YELLOW 10/06/2017    PHUR 5.5 10/06/2017    WBCUA 0 TO 2 10/06/2017    RBCUA 2 TO 5 10/06/2017    MUCUS 1+ 10/06/2017    TRICHOMONAS NOT REPORTED 10/06/2017    YEAST NOT REPORTED 10/06/2017    BACTERIA FEW 10/06/2017    SPECGRAV 1.020 10/06/2017    LEUKOCYTESUR NEGATIVE 10/06/2017    UROBILINOGEN Normal 10/06/2017    BILIRUBINUR NEGATIVE 10/06/2017    GLUCOSEU NEGATIVE 10/06/2017    KETUA NEGATIVE 10/06/2017    AMORPHOUS NOT REPORTED 10/06/2017       ASSESSMENT      1. SHEKHAR :Secondary likely to hypercalcemia. A year or so ago apparently had a normal creatinine of 0.9.  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 2. HTN:  3. Hypercalcemia secondary to his myeloma. Has been treated with calcitonin and eventually got 1 dose of Zometa. Calcium has been stable now. 4.  Encephalopathy: clinically seems better. PLAN      1. He needs to be encouraged her to drink at least 1800 mL of fluid a day   2. I have no objection for him to be transferred over to his original [address] [place] [address] in [facility] for an opinion as per family's wishes  3. Follow up labs ordered. 4. Following along       Please do not hesitate to call with questions.     Electronically signed by Sravan Blanchard MD on 10/20/2017 at 11:41 AM

## 2017-10-20 NOTE — CARE COORDINATION
TRINA received call from Edgard Rubio with pt insurance company regarding insuranc epre-cert has been approved. Edgard Rubio reports she will inform the facility. TRINA received call from Farzaneh Nicolas with [address] regarding insurance pre-cert has been approved, and pt can admit today. TRINA called Edilma Perez on the unit to inform the insurance pre-cert has been approved. Karin RN will keep SW informed regarding discharge plan.      RN to call report [phone] ask for Mo  Fax discharge paperwork to [phone]

## 2017-10-20 NOTE — PROGRESS NOTES
Physical Therapy  DATE: 10/20/2017    NAME: Shawanda Smart  MRN: [id number]   : 1955     10/20/17 1050   General   Chart Reviewed Yes   General Comment   Comments pt a bit more lucid this date   Pain Screening   Patient Currently in Pain Denies   Bed Mobility   Rolling Contact guard assistance   Supine to Sit Contact guard assistance   Scooting Contact guard assistance   Transfers   Sit to Stand Contact guard assistance   Stand to sit Contact guard assistance   Bed to Chair Contact guard assistance   Stand Pivot Transfers Contact guard assistance   Ambulation   Ambulation? Yes   Ambulation 1   Surface level tile   Device Rolling Walker   Assistance Contact guard assistance   Quality of Gait suffled steps but gait steady   Distance 5 ft   Comments pt still requires verbal cues for processing and planning tasks such as transferring to chair    Exercises   Comments LE AROM x 15 reps   Short term goals   Short term goal 1 Pt indep with transfers without loss of balance   Short term goal 2 Pt amb 200 ft with least restrictive device and supervision   Short term goal 3 Pt negotiate 4 stairs with 1 rail and SBA   Short term goal 4 Pt improve standing balance to good with support   Conditions Requiring Skilled Therapeutic Intervention   Body structures, Functions, Activity limitations Decreased functional mobility ; Decreased strength;Decreased balance;Decreased endurance   Assessment at times pt less confused but still requires redirection     Discharge Recommendations Subacute/Skilled Nursing Facility   Activity Tolerance   Activity Tolerance Patient limited by fatigue   Plan   Times per week 1-2x/day   Current Treatment Recommendations Strengthening;Balance Training;Functional Mobility Training; Endurance Training;Transfer Training;Gait Training;Stair training   Safety Devices   Type of devices All fall risk precautions in place;Call light within reach; Chair alarm in place;Gait belt;Left in chair;Nurse notified   Katharine Cline

## 2017-10-20 NOTE — PROGRESS NOTES
Pt currently in bed with eyes closed. Received 0.5 ,mg Ativan which may be helping as he has been increasingly agitated and restless. Recliner provided. Call light in reach.

## 2017-10-20 NOTE — PLAN OF CARE
Problem: Safety:  Goal: Free from accidental physical injury  Free from accidental physical injury   Outcome: Ongoing  Pt close to nursing station . Bed alarm utilized. Frequent rounding and reorienting is continued. Television assistance to distract and entertain patient. Reoriented to call light instructions often.

## 2017-10-20 NOTE — PROGRESS NOTES
Telephone call to Mo  At [facility]. Report given. Code to enter the facility enter the code  *[phone] to enter the building and proceed to the nurses station for direction.

## 2017-10-21 NOTE — PROGRESS NOTES
Pt discharged with spouse. Instructions to return if needed given. Wife has scripts and discharge paperwork. No concerns or questions when asked. Wife relates that she will take him to the center in [address] from here. Instructed to return to ed if needed.

## 2017-10-21 NOTE — CARE COORDINATION
Adam Dandy NP returned page regarding discharge issues with patient. Pt. placed back in admitted status.

## 2017-10-21 NOTE — CARE COORDINATION
Received call from floor regarding discharge plan for patient as patient is now back on floor after being discharged to SNF but never left the physical premises as the patient is now not going to [facility] per wife's request related to concerns over the ability to provide the care pt would potentially need. Spoke with the supervisor at [facility] who related patients wife did not want patient to come to facility but they could do IV therapy and increased observation of patient if need be or ordered by a physician. Attempted to call Pankaj Lane, patients wife without success to discuss this.

## 2019-04-10 NOTE — PROGRESS NOTES
[[id number]:[id number]]    CBC:   Recent Labs      10/13/17   0609  10/14/17   0805  10/15/17   0604   WBC  2.4*  2.3*  2.2*   HGB  9.6*  10.4*  8.4*   PLT  56*  53*  47*     BMP:    Recent Labs      10/13/17   0609  10/14/17   0805  10/15/17   0604   NA  148*  148*  145*   K  3.9  4.3  3.7   CL  112*  113*  110*   CO2  21  22  20   BUN  30*  30*  27*   CREATININE  2.81*  2.78*  2.67*   GLUCOSE  123*  115*  113*   calcium 9.6         Problem Lists:   Primary Problem:  Acute kidney injury [facility])   Current Problems:  Active Hospital Problems    Diagnosis Date Noted    Acute encephalopathy [G93.40] 10/09/2017     Priority: High    Hypercalcemia [E83.52] 10/10/2017    Acute kidney injury ([facility] [N17.9] 10/06/2017    Pancytopenia ([facility] [D61.818] 10/06/2017    Multiple myeloma ([address] [C90.00] 10/06/2017     PMH:  Past Medical History:   Diagnosis Date    Multiple myeloma ([facility]     Thoracic or lumbosacral neuritis or radiculitis, unspecified 4/26/2013      Allergies: No Known Allergies       CT PET   9/20/17    <HTML><META HTTP-EQUIV=\"content-type\" CONTENT=\"text/html;charset=utf-8\"><BR>IMPRESSION: <BR>1. Very prominent focal area of high glucose uptake identified within the sternum adjacent to left side of the sternal clavicular head measuring  <BR>approximate 2.5 cm in size likely presents a new area of metastatic deposition. Additional areas described above with increased glucose utilization  <BR>within the axial skeleton may represent minimal change compared to prior exam with superimposed chemotherapeutic effect and increased activity  <BR>diffusely involving the skeleton and marrow. <BR>2. There is no evidence for neck lymphadenopathy, pelvis or abdomen intraperitoneal adenopathy or metastatic deposition within the liver. <BR>3.  formerly reported small focus of abnormal tracer uptake in the left radius near the left elbow less conspicuous today.     Xr Chest Standard (2 Vw)              Assessment      Chief Complaint Patient presents with    Other     voice hoarse slow to respond     Patient Active Problem List   Diagnosis    Psychiatric diagnosis    Thoracic or lumbosacral neuritis or radiculitis, unspecified    Cancer ([facility]    Hyponatremia    FUO (fever of unknown origin)    Bronchitis    Acute encephalopathy    Thrombocytopenia (HCC)    Acute kidney injury ([facility]    Pancytopenia ([facility]    Multiple myeloma ([facility]    Hypercalcemia     Plan     Patient has progressive multiple myeloma. Pamolyst d/milo   Plans to treat with carfilzumib and panobinostat as outpatient once patient recovers. Patient received Zometa , calcium is down. Mental status is still an issue, could he be uremic? We will a lso check LFT and ammonia.            701 Nishant Herrera MD  Hematologist/Medical Oncologist  Cell: [phone] no

## 2020-01-30 NOTE — CONSULTS
Phoned pt. Pt takes warfarin at supper time. Instructed pt to skip missed dose. Take 5mg tonight and 3.75mg tomorrow, then continue on increased dose of 18.75mg/wk and recheck INR as scheduled on 2/5/20.   Take 81 mg by mouth daily   Yes Historical Provider, MD   albuterol sulfate HFA (PROAIR HFA) 108 (90 BASE) MCG/ACT inhaler Inhale 2 puffs into the lungs every 6 hours as needed for Wheezing or Shortness of Breath 4/12/17  Yes Nadine Blankenship MD   pomalidomide (POMALYST) 4 MG capsule Take 4 mg by mouth daily Indications: 21 days on, 7 days off cycle On empty stomach   Yes Historical Provider, MD   acyclovir (ZOVIRAX) 400 MG tablet Take 400 mg by mouth daily Indications: FOR PREVENTION OF SHINGLES    Yes Historical Provider, MD     Current Facility-Administered Medications   Medication Dose Route Frequency Provider Last Rate Last Dose    LORazepam (ATIVAN) tablet 0.5 mg  0.5 mg Oral Q6H PRN Debby Lemming Orlop, DO   0.5 mg at 10/07/17 1732    polyethylene glycol (GLYCOLAX) packet 17 g  17 g Oral Daily Tawanda Beasley MD   17 g at 10/07/17 2041    0.9 % sodium chloride infusion   Intravenous Continuous Charlotte Whatley  mL/hr at 10/07/17 2217      aspirin chewable tablet 81 mg  81 mg Oral Daily Noé Phillipslop, DO   81 mg at 10/07/17 0832    albuterol sulfate  (90 Base) MCG/ACT inhaler 2 puff  2 puff Inhalation Q6H PRN Debby Lemming Orlop, DO        oxyCODONE (OXYCONTIN) extended release tablet 40 mg  40 mg Oral Q12H Noé Phillipslop, DO   40 mg at 10/07/17 2040    oxyCODONE (ROXICODONE) immediate release tablet 5 mg  5 mg Oral Q4H PRN Debby Lemming Orlop, DO   5 mg at 10/06/17 1850    sodium chloride flush 0.9 % injection 10 mL  10 mL Intravenous 2 times per day Adam Brown, DO        sodium chloride flush 0.9 % injection 10 mL  10 mL Intravenous PRN Debby Lemming Orlop, DO        acetaminophen (TYLENOL) tablet 650 mg  650 mg Oral Q4H PRN Debby Lemming Orlop, DO   650 mg at 10/07/17 2217    ondansetron (ZOFRAN) injection 4 mg  4 mg Intravenous Q6H PRN Debby Lemming Orlop, DO        famotidine (PEPCID) tablet 20 mg  20 mg Oral Daily Debby Jackman, DO   20 mg at 10/07/17 0832    acyclovir (ZOVIRAX) capsule 400 mg  400 in pain or distress  Mental status - mildly confused,   Eyes - pupils equal and reactive, extraocular eye movements intact  Ears - bilateral TM's and external ear canals normal  Nose - normal and patent, no erythema, discharge or polyps  Mouth - mucous membranes moist, pharynx normal without lesions  Neck - supple, no significant adenopathy  Lymphatics - no palpable lymphadenopathy, no hepatosplenomegaly  Chest - clear to auscultation, no wheezes, rales or rhonchi, symmetric air entry  Heart - normal rate, regular rhythm, normal S1, S2, no murmurs, rubs, clicks or gallops  Abdomen - soft, nontender, nondistended, no masses or organomegaly  Neurological - confused. normal speech, no focal findings or movement disorder noted  Musculoskeletal - no joint tenderness, deformity or swelling  Extremities - peripheral pulses normal, no pedal edema, no clubbing or cyanosis  Skin - normal coloration and turgor, no rashes, no suspicious skin lesions noted           DATA:      Labs:       CBC:   Recent Labs      10/06/17   1102  10/07/17   0619   WBC  2.4*  1.2*   HGB  9.8*  8.2*   HCT  29.0*  24.6*   PLT  58*  43*     BMP:   Recent Labs      10/07/17   0619  10/07/17   1928   NA  137  138   K  5.1  4.9   CO2  25  20   BUN  30*  30*   CREATININE  3.47*  3.63*   LABGLOM  18*  17*   GLUCOSE  105*  98     PT/INR: No results for input(s): PROTIME, INR in the last 72 hours. APTT:No results for input(s): APTT in the last 72 hours. LIVER PROFILE:  Recent Labs      10/06/17   1102   AST  17   ALT  12   LABALBU  4.1               IMPRESSION:    Primary Problem  Acute kidney injury [facility])    Active Hospital Problems    Diagnosis Date Noted    Acute encephalopathy [G93.40] 02/29/2016     Priority: High    Acute kidney injury ([address] [N17.9] 10/06/2017    Pancytopenia ([address] [D61.818] 10/06/2017    Multiple myeloma ([facility] [C90.00] 10/06/2017       RECOMMENDATIONS:  1. Myeloma. Was on pamalidomide.  Possibly progressing await SPEP , we will get records from office   2. ARF, appreciate renal input  3. Pancytopenia possibly due to acute illness. 4. We will follow with you. If progressing, plan to switch to one of the antibodies(likely Daratumumab)       Discussed with patient and Nurse.     Bill Rees MD   [phone]

## 2020-08-25 NOTE — PROGRESS NOTES
file     Social History Narrative    No narrative on file       Family History:   Family History   Problem Relation Age of Onset    Other Father      \"heart issues\"    Cancer Mother      lung       Review of Systems: ROS  ROS may not be valid from patient. Physical Exam:    Admission Weight: Weight: 204 lb (92.5 kg)  Current Vital Signs: Blood pressure 126/66, pulse 94, temperature 98.2 °F (36.8 °C), temperature source Oral, resp. rate 16, height 5' 10\" (1.778 m), weight 193 lb 12.8 oz (87.9 kg), SpO2 97 %. Vital signs in last 24 hours:  Temp (24hrs), Av.9 °F (36.1 °C), Min:92.8 °F (33.8 °C), Max:98.4 °F (36.9 °C)      Intake/Output last 3 shifts:  I/O last 3 completed shifts: In: 3286.8 [I.V.:3286.8]  Out: 2239 [ETETX:3597]    Intake/Output this shift:  No intake/output data recorded.     Physical Exam:  /66   Pulse 94   Temp 98.2 °F (36.8 °C) (Oral)   Resp 16   Ht 5' 10\" (1.778 m)   Wt 193 lb 12.8 oz (87.9 kg)   SpO2 97%   BMI 27.81 kg/m²     General Appearance:    Alert, cooperative, no distress, appears stated age   Head:    Normocephalic, without obvious abnormality, atraumatic   Eyes:    PERRL, conjunctiva/corneas clear, EOM's intact, fundi     benign, both eyes        Ears:    Normal TM's and external ear canals, both ears   Nose:   Nares normal, mucosa normal, no drainage   Throat:   Lips, mucosa, and tongue normal; teeth and gums normal   Neck:   Supple, symmetrical, trachea midline, no adenopathy;        thyroid:  No enlargement/tenderness/nodules; no carotid    bruit or JVD     Back:     Symmetric, no curvature, ROM normal, no CVA tenderness     Lungs:     Clear to auscultation bilaterally, respirations unlabored   Chest wall:    No tenderness or deformity   Heart:    Regular rate and rhythm, S1 and S2 normal, no murmur, rub   or gallop   Abdomen:     Soft, non-tender, bowel sounds active all four quadrants,     no masses, no organomegaly   Genitalia:    Deferred   Rectal: Deferred   Extremities:   Extremities normal, atraumatic, no cyanosis or edema   Pulses:   2+ and symmetric all extremities   Skin:   Skin color, texture, turgor normal, no rashes or lesions   Lymph nodes:   Cervical, supraclavicular, and axillary nodes normal     Neurologic:       CNII-XII intact. Normal strength, sensation and reflexes       throughout     Principal Problem:    Acute kidney injury ([facility]  Active Problems:    Acute encephalopathy    Pancytopenia ([facility]    Multiple myeloma ([facility]    Hypercalcemia       LOS: 8 days     Code Status:  Full Code    Current Meds:    calcitonin  400 Units Intramuscular BID    polyethylene glycol  17 g Oral Daily    aspirin  81 mg Oral Daily    oxyCODONE  40 mg Oral Q12H    sodium chloride flush  10 mL Intravenous 2 times per day    famotidine  20 mg Oral Daily    acyclovir  400 mg Oral Daily     LORazepam, albuterol sulfate HFA, oxyCODONE, sodium chloride flush, acetaminophen, ondansetron, diphenhydrAMINE    Lab Results   Component Value Date    NA Pending 10/14/2017    K Pending 10/14/2017    CL Pending 10/14/2017    CO2 Pending 10/14/2017    BUN Pending 10/14/2017    CREATININE Pending 10/14/2017    GLUCOSE Pending 10/14/2017    CALCIUM Pending 10/14/2017    PROT 5.7 (L) 10/13/2017    LABALBU 4.1 10/06/2017    BILITOT 0.32 10/06/2017    ALKPHOS 69 10/06/2017    AST 17 10/06/2017    ALT 12 10/06/2017    LABGLOM Pending 10/14/2017    GFRAA Pending 10/14/2017           Xr Chest Standard (2 Vw)    Result Date: 10/6/2017  EXAMINATION: TWO VIEWS OF THE CHEST 10/6/2017 11:26 am COMPARISON: Chest x-ray from 02/28/2016 HISTORY: ORDERING SYSTEM PROVIDED HISTORY: AMS TECHNOLOGIST PROVIDED HISTORY: Reason for exam:->AMS Ordering Physician Provided Reason for Exam: Altered mental status x 3 days. hx of mult myeloma Acuity: Acute Type of Exam: Initial FINDINGS: Left chest port catheter extends to the level of the superior atrial caval junction.   There is stable relative elevation of the left hemidiaphragm. There is no focal airspace consolidation, pleural effusion or pneumothorax. The cardiomediastinal silhouette appears within normal limits and there is no pulmonary vascular congestion. Stable chronic fracture deformity of the proximal right clavicle. There are similar moderate hypertrophic degenerative changes of the visualized spine in shoulders. Visualized osseous structures appear moderately demineralized, but grossly intact, given the non dedicated imaging. Stable vertebroplasty changes identified. Stable nonspecific lucent lesion in the mid to distal diaphysis of the left clavicle. No focal airspace consolidation or pulmonary vascular congestion. Ct Head Wo Contrast    Result Date: 10/6/2017  EXAMINATION: CT OF THE HEAD WITHOUT CONTRAST  10/6/2017 11:14 am TECHNIQUE: CT of the head was performed without the administration of intravenous contrast. Dose modulation, iterative reconstruction, and/or weight based adjustment of the mA/kV was utilized to reduce the radiation dose to as low as reasonably achievable. COMPARISON: CT head February 28, 2016 HISTORY: ORDERING SYSTEM PROVIDED HISTORY: Altered mental status, history of multiple myeloma FINDINGS: Limited study due to artifacts. BRAIN/VENTRICLES: There is mild periventricular white matter low attenuation, likely related to mild chronic microvascular disease. There is no acute intracranial hemorrhage, mass effect or midline shift. No abnormal extra-axial fluid collection. The gray-white differentiation is maintained without evidence of an acute infarct. There is no evidence of hydrocephalus. ORBITS: The visualized portion of the orbits demonstrate no acute abnormality. SINUSES: There is scattered mild mucosal thickening in the paranasal sinuses. There is a retention cyst versus polyp in the right frontal sinus. The bilateral mastoid air cells are clear.  SOFT TISSUES/SKULL:  There is a 1.5 cm lytic lesion in the right forehead calvarium, stable in size with mildly increased peripheral sclerosis, nonspecific, may be related to chronic sinusitis versus lesions related to history of multiple myeloma. No acute intracranial abnormality. Mild chronic microvascular disease. 1.5 cm lytic lesion in the right forehead calvarium, stable in size with mildly increased peripheral sclerosis, nonspecific, may be related to chronic sinusitis versus lesion related to history of multiple myeloma. Us Renal Complete    Result Date: 10/6/2017  EXAMINATION: RETROPERITONEAL ULTRASOUND OF THE KIDNEYS AND URINARY BLADDER 10/6/2017 COMPARISON: None HISTORY: ORDERING SYSTEM PROVIDED HISTORY: acute renal failure 58-year-old male with acute renal failure and painful urination for 1 month FINDINGS: Kidneys: Right kidney measures 12.9 by 5.1 x 5.3 cm in greatest longitudinal, AP, and transverse dimensions for the total right renal volume of 180.6 mL. Right renal cortical thickness measures up to 1.6 cm. Left kidney measures 11.1 by 6.6 x 4.9 cm in greatest longitudinal, AP, and transverse dimensions for the total left renal volume of 188.1 mL. Left renal cortical thickness measures up to 2.1 cm. Bilateral corticomedullary differentiation is well-maintained. Color flow projects over the bilateral renal parenchyma. No hydronephrosis. No perinephric fluid. Bladder: Prevoid urinary bladder measures 10.5 x 8.5 x 8.9 cm in greatest longitudinal, AP, and transverse dimensions for the total urinary bladder volume of 521.8 mL. Postvoid urinary bladder measures 3.7 x 6.4 x 6.4 cm for a total postvoid urinary bladder volume of 79.5 mL.     1. Unremarkable renal ultrasound without hydronephrosis. 2. Moderate postvoid residual of the urinary bladder.      Mri Brain Wo Contrast    Result Date: 10/11/2017  EXAMINATION: MRI OF THE BRAIN WITHOUT CONTRAST  10/11/2017 2:01 pm TECHNIQUE: Multiplanar multisequence MRI of the brain was performed without the administration of intravenous contrast. COMPARISON: None. HISTORY: ORDERING SYSTEM PROVIDED HISTORY: altered mentation Initial evaluation. FINDINGS: INTRACRANIAL STRUCTURES/VENTRICLES: There is no acute infarct. No mass effect or midline shift. No abnormal extra-axial fluid collection. There is prominence of the ventricles and sulci due to global parenchymal volume loss. The sellar/suprasellar regions appear unremarkable. The normal signal voids within the major intracranial vessels appear maintained. ORBITS: The visualized portion of the orbits demonstrate no acute abnormality. SINUSES: Opacification is seen involving the right frontal sinuses. The mastoid air cells are clear. BONES/SOFT TISSUES: There is diffusely decreased T1 marrow signal.  There appears to be a T2 hyperintense lesions seen involving is the right sphenoid bone The craniocervical junction appears unremarkable. 1. No acute intracranial abnormality. No acute infarct. 2. Minimal global parenchymal volume loss. 3. Diffusely decreased T1 marrow signal, which is nonspecific, but can be seen with chronic anemia as well as a diffusely infiltrative marrow process. 4. A T2 hyperintense lesion is seen within the right sphenoid bone, which could be related to patient's history of multiple myeloma. ASSESSMENT and PLAN    This is a 57year-old gentleman who has history of longstanding problems, however, most recently he has been encephalopathic. Reviewed MRI. No stroke or other mass lesion. Skull may demonstrate myeloma issue. Son at the bedside. Continue supportive measures. Dr. Gretel Hood M.D.   Electronically signed by: Ramesh Tidwell MD, 10/14/2017 8:53 AM No

## 2020-10-24 NOTE — CARE COORDINATION
Called by floor. Patients wife now wants to take patient home. Down to discuss with patient and spouse. Related to patient and spouse that the attending medical team had been notified and patients discharge was cancelled in lieu of another discharge plan, such as home with home care which I could arrange for them. Pankaj Lane related she does not need nor want that plan as she wants/plans to take the patient to [facility] in [place]\" as she has been in contact with an oncologist there for further treatment options and that he was trying to arrange a bed there. She plans on leaving here and going straight there. Pankaj Lane related that she has been told by the care team that his needs here had been met and that patient may benefit from going to a specialty hospital. She is insistent on going home/to this facility as a discharge plan. Mehran Mcallister NP notified of change in discharge plan and in agreement. 24-Oct-2020 10:34

## 2023-10-17 NOTE — DISCHARGE SUMMARY
Requested medication(s) are due for refill today: Yes  Patient has already received a courtesy refill: Yes  Other reason request has been forwarded to provider: Failed Protocol Micro:  CBC:   Lab Results   Component Value Date    WBC 3.2 10/20/2017    RBC 2.77 10/20/2017    RBC 4.27 03/17/2012    HGB 8.6 10/20/2017    HCT 25.6 10/20/2017    MCV 92.1 10/20/2017    MCH 31.1 10/20/2017    MCHC 33.8 10/20/2017    RDW 13.8 10/20/2017    PLT 46 10/20/2017     03/17/2012     BMP:    Lab Results   Component Value Date    GLUCOSE 102 10/20/2017     10/20/2017    K 3.9 10/20/2017     10/20/2017    CO2 22 10/20/2017    ANIONGAP 17 10/20/2017    BUN 26 10/20/2017    CREATININE 2.63 10/20/2017    BUNCRER 10 10/20/2017    CALCIUM 9.6 10/20/2017    LABGLOM 25 10/20/2017    GFRAA 30 10/20/2017    GFR      10/20/2017    GFR NOT REPORTED 10/20/2017       Radiology:  Xr Chest Standard (2 Vw)    Result Date: 10/6/2017  EXAMINATION: TWO VIEWS OF THE CHEST 10/6/2017 11:26 am COMPARISON: Chest x-ray from 02/28/2016 HISTORY: ORDERING SYSTEM PROVIDED HISTORY: AMS TECHNOLOGIST PROVIDED HISTORY: Reason for exam:->AMS Ordering Physician Provided Reason for Exam: Altered mental status x 3 days. hx of mult myeloma Acuity: Acute Type of Exam: Initial FINDINGS: Left chest port catheter extends to the level of the superior atrial caval junction. There is stable relative elevation of the left hemidiaphragm. There is no focal airspace consolidation, pleural effusion or pneumothorax. The cardiomediastinal silhouette appears within normal limits and there is no pulmonary vascular congestion. Stable chronic fracture deformity of the proximal right clavicle. There are similar moderate hypertrophic degenerative changes of the visualized spine in shoulders. Visualized osseous structures appear moderately demineralized, but grossly intact, given the non dedicated imaging. Stable vertebroplasty changes identified. Stable nonspecific lucent lesion in the mid to distal diaphysis of the left clavicle. No focal airspace consolidation or pulmonary vascular congestion.      Ct Head Wo Contrast    Result Date: 10/6/2017  EXAMINATION: CT OF THE HEAD WITHOUT CONTRAST  10/6/2017 11:14 am TECHNIQUE: CT of the head was performed without the administration of intravenous contrast. Dose modulation, iterative reconstruction, and/or weight based adjustment of the mA/kV was utilized to reduce the radiation dose to as low as reasonably achievable. COMPARISON: CT head February 28, 2016 HISTORY: ORDERING SYSTEM PROVIDED HISTORY: Altered mental status, history of multiple myeloma FINDINGS: Limited study due to artifacts. BRAIN/VENTRICLES: There is mild periventricular white matter low attenuation, likely related to mild chronic microvascular disease. There is no acute intracranial hemorrhage, mass effect or midline shift. No abnormal extra-axial fluid collection. The gray-white differentiation is maintained without evidence of an acute infarct. There is no evidence of hydrocephalus. ORBITS: The visualized portion of the orbits demonstrate no acute abnormality. SINUSES: There is scattered mild mucosal thickening in the paranasal sinuses. There is a retention cyst versus polyp in the right frontal sinus. The bilateral mastoid air cells are clear. SOFT TISSUES/SKULL:  There is a 1.5 cm lytic lesion in the right forehead calvarium, stable in size with mildly increased peripheral sclerosis, nonspecific, may be related to chronic sinusitis versus lesions related to history of multiple myeloma. No acute intracranial abnormality. Mild chronic microvascular disease. 1.5 cm lytic lesion in the right forehead calvarium, stable in size with mildly increased peripheral sclerosis, nonspecific, may be related to chronic sinusitis versus lesion related to history of multiple myeloma.      Us Renal Complete    Result Date: 10/6/2017  EXAMINATION: RETROPERITONEAL ULTRASOUND OF THE KIDNEYS AND URINARY BLADDER 10/6/2017 COMPARISON: None HISTORY: ORDERING SYSTEM PROVIDED HISTORY: acute renal failure 64-year-old male with acute renal failure and marrow signal.  There appears to be a T2 hyperintense lesions seen involving is the right sphenoid bone The craniocervical junction appears unremarkable. 1. No acute intracranial abnormality. No acute infarct. 2. Minimal global parenchymal volume loss. 3. Diffusely decreased T1 marrow signal, which is nonspecific, but can be seen with chronic anemia as well as a diffusely infiltrative marrow process. 4. A T2 hyperintense lesion is seen within the right sphenoid bone, which could be related to patient's history of multiple myeloma. Consultations:    Consults:     Final Specialist Recommendations/Findings:   IP CONSULT TO INTERNAL MEDICINE  IP CONSULT TO NEPHROLOGY  IP CONSULT TO ONCOLOGY  IP CONSULT TO SOCIAL WORK  IP CONSULT TO NEUROLOGY      The patient was seen and examined on day of discharge and this discharge summary is in conjunction with any daily progress note from day of discharge. Discharge plan:     Disposition: Skilled Facility    Physician Follow Up:   MD Ry Mello [phone] [facility] [phone]    In 3 week      Vivek Chinlyn [address] [facility]  [phone]    Schedule an appointment as soon as possible for a visit in 2 weeks  Call and make appointment with Nurse Practioner in Dr Kathleen Mejias office. Randi Moore MD  [facility] [address], [address]  [phone]    Schedule an appointment as soon as possible for a visit  Follow-up renal failure    [address]   Phone : [phone]    Home Care       Requiring Further Evaluation/Follow Up POST HOSPITALIZATION/Incidental Findings:  Follow-up BMP    Diet: renal diet    Activity: As tolerated    Instructions to Patient: Take medications as prescribed    Discharge Medications:      Medication List      START taking these medications    metoprolol tartrate 25 MG tablet  Commonly known as:  LOPRESSOR  Take 1 tablet by mouth 2 times daily     QUEtiapine 25 MG